# Patient Record
Sex: MALE | Race: OTHER | NOT HISPANIC OR LATINO | Employment: FULL TIME | URBAN - METROPOLITAN AREA
[De-identification: names, ages, dates, MRNs, and addresses within clinical notes are randomized per-mention and may not be internally consistent; named-entity substitution may affect disease eponyms.]

---

## 2023-03-17 LAB — HBA1C MFR BLD HPLC: 5.1 %

## 2023-08-18 ENCOUNTER — APPOINTMENT (OUTPATIENT)
Dept: URGENT CARE | Facility: CLINIC | Age: 43
End: 2023-08-18

## 2023-08-18 ENCOUNTER — APPOINTMENT (OUTPATIENT)
Dept: LAB | Facility: CLINIC | Age: 43
End: 2023-08-18

## 2023-08-18 DIAGNOSIS — Z02.1 PRE-EMPLOYMENT HEALTH SCREENING EXAMINATION: Primary | ICD-10-CM

## 2023-08-18 DIAGNOSIS — Z02.1 PRE-EMPLOYMENT HEALTH SCREENING EXAMINATION: ICD-10-CM

## 2023-08-18 LAB
MEV IGG SER QL IA: NORMAL
MUV IGG SER QL IA: NORMAL
RUBV IGG SERPL IA-ACNC: 40.5 IU/ML
VZV IGG SER QL IA: NORMAL

## 2023-08-18 PROCEDURE — 86787 VARICELLA-ZOSTER ANTIBODY: CPT

## 2023-08-18 PROCEDURE — 86765 RUBEOLA ANTIBODY: CPT

## 2023-08-18 PROCEDURE — 86480 TB TEST CELL IMMUN MEASURE: CPT

## 2023-08-18 PROCEDURE — 86762 RUBELLA ANTIBODY: CPT

## 2023-08-18 PROCEDURE — 36415 COLL VENOUS BLD VENIPUNCTURE: CPT

## 2023-08-18 PROCEDURE — 86735 MUMPS ANTIBODY: CPT

## 2023-08-22 LAB
GAMMA INTERFERON BACKGROUND BLD IA-ACNC: 0.03 IU/ML
M TB IFN-G BLD-IMP: NEGATIVE
M TB IFN-G CD4+ BCKGRND COR BLD-ACNC: 0.04 IU/ML
M TB IFN-G CD4+ BCKGRND COR BLD-ACNC: 0.06 IU/ML
MITOGEN IGNF BCKGRD COR BLD-ACNC: >10 IU/ML

## 2023-09-26 ENCOUNTER — OFFICE VISIT (OUTPATIENT)
Dept: FAMILY MEDICINE CLINIC | Facility: CLINIC | Age: 43
End: 2023-09-26
Payer: COMMERCIAL

## 2023-09-26 VITALS
OXYGEN SATURATION: 99 % | SYSTOLIC BLOOD PRESSURE: 110 MMHG | TEMPERATURE: 95.3 F | HEIGHT: 69 IN | BODY MASS INDEX: 27.25 KG/M2 | WEIGHT: 184 LBS | HEART RATE: 64 BPM | DIASTOLIC BLOOD PRESSURE: 76 MMHG

## 2023-09-26 DIAGNOSIS — E03.9 ACQUIRED HYPOTHYROIDISM: ICD-10-CM

## 2023-09-26 DIAGNOSIS — Z13.220 LIPID SCREENING: ICD-10-CM

## 2023-09-26 DIAGNOSIS — R04.0 FREQUENT NOSEBLEEDS: ICD-10-CM

## 2023-09-26 DIAGNOSIS — Z87.898 HISTORY OF PREDIABETES: ICD-10-CM

## 2023-09-26 DIAGNOSIS — Z76.89 ENCOUNTER TO ESTABLISH CARE: Primary | ICD-10-CM

## 2023-09-26 PROCEDURE — 99203 OFFICE O/P NEW LOW 30 MIN: CPT | Performed by: NURSE PRACTITIONER

## 2023-09-26 RX ORDER — RUXOLITINIB 15 MG/G
CREAM TOPICAL
COMMUNITY
Start: 2023-08-17

## 2023-09-26 RX ORDER — SEMAGLUTIDE 0.5 MG/.5ML
0.5 INJECTION, SOLUTION SUBCUTANEOUS
COMMUNITY
Start: 2023-06-08

## 2023-09-26 RX ORDER — LEVOTHYROXINE SODIUM 0.07 MG/1
1 TABLET ORAL DAILY
COMMUNITY
Start: 2023-04-27 | End: 2023-09-26 | Stop reason: SDUPTHER

## 2023-09-26 RX ORDER — LEVOTHYROXINE SODIUM 0.07 MG/1
75 TABLET ORAL DAILY
Qty: 90 TABLET | Refills: 0 | Status: SHIPPED | OUTPATIENT
Start: 2023-09-26

## 2023-09-26 NOTE — PATIENT INSTRUCTIONS
Labs as ordered  Continue levothyroxine 75 mcg daily  F/up with Gi as scheduled for difficulty swallowing  F/up with ENT for frequent nose bleeds  Schedule appointment for annual physical after labs completed

## 2023-10-05 ENCOUNTER — TELEPHONE (OUTPATIENT)
Dept: GASTROENTEROLOGY | Facility: CLINIC | Age: 43
End: 2023-10-05

## 2023-10-05 ENCOUNTER — OFFICE VISIT (OUTPATIENT)
Dept: GASTROENTEROLOGY | Facility: CLINIC | Age: 43
End: 2023-10-05
Payer: COMMERCIAL

## 2023-10-05 VITALS
BODY MASS INDEX: 27.7 KG/M2 | SYSTOLIC BLOOD PRESSURE: 100 MMHG | WEIGHT: 187 LBS | HEIGHT: 69 IN | DIASTOLIC BLOOD PRESSURE: 70 MMHG

## 2023-10-05 DIAGNOSIS — R13.19 ESOPHAGEAL DYSPHAGIA: Primary | ICD-10-CM

## 2023-10-05 DIAGNOSIS — R10.13 EPIGASTRIC PAIN: ICD-10-CM

## 2023-10-05 DIAGNOSIS — Z12.11 COLON CANCER SCREENING: ICD-10-CM

## 2023-10-05 PROCEDURE — 99204 OFFICE O/P NEW MOD 45 MIN: CPT | Performed by: INTERNAL MEDICINE

## 2023-10-05 NOTE — PROGRESS NOTES
[address] [place] [address] [facility] Outpatient Consultation  Ebb Or 37 y.o. male MRN: [id number]  Encounter: [id number]    ASSESSMENT AND PLAN:      1. Esophageal dysphagia  1 to 2 years of food sticking in his upper esophagus with his initial swallow, better with water. Does not occur with pills. No change with omeprazole. Discussed with work-up options including barium swallow, motility testing or upper endoscopy. He agrees to EGD with dilatation if indicated. - EGD; Future    2. Epigastric pain  2 to 3 years of epigastric burning with some associated back pain, no specific food triggers. He has noted it with banana and 9 bread, but no change with a gluten-free diet. Some improvement with omeprazole. Plan upper endoscopy to assess for gastritis, peptic ulcer disease and H. pylori  - EGD; Future    3. Colon cancer screening  Plan colonoscopy at the time of upcoming EGD  - Colonoscopy; Future      Followup Appointment: Pending EGD/colonoscopy  ______________________________________________________________________    Chief Complaint   Patient presents with   • dificulty swallowing; burning sensation       HPI:   Alexy Or is a 37y.o. year old male who presents for consultation at the request of his PCP for multiple GI complaints. In around 2020 he began to experience upper abdominal burning after eating. This does not occur every day or with every food. There are no reliable food triggers. He has noted it with bananas and with 9 bread. He does okay with tomato sauce and spicy foods. There is no associated nausea or vomiting. His appetite is good and his weight is stable. He tried omeprazole which helped symptoms. He has been off of it for several weeks and symptoms have returned. He denies any regular NSAID use. He also complains of food sticking in his upper esophagus. This occurs with the first swallow at any meal.  It is occurred with meats and with breads.   It does not occur with liquids or pills. Symptoms improve when he drinks some water. He denies any lower GI complaints. He is scheduling colon cancer screening at the time of EGD. Historical Information   Past Medical History:   Diagnosis Date   • Disease of thyroid gland      Past Surgical History:   Procedure Laterality Date   • LIPOMA RESECTION Bilateral 2013     Social History     Substance and Sexual Activity   Alcohol Use Yes   • Alcohol/week: 2.0 standard drinks of alcohol   • Types: 2 Shots of liquor per week     Social History     Substance and Sexual Activity   Drug Use Never     Social History     Tobacco Use   Smoking Status Never   Smokeless Tobacco Never     Family History   Problem Relation Age of Onset   • Hypertension Mother    • Diabetes Mother    • Glaucoma Father        Meds/Allergies     Current Outpatient Medications:   •  levothyroxine 75 mcg tablet  •  Semaglutide-Weight Management [facility]) 0.5 MG/0.5ML  •  Opzelura 1.5 % CREA    Allergies   Allergen Reactions   • Pollen Extract Allergic Rhinitis and Shortness Of Breath     Other reaction(s): Sneezing  Other reaction(s): Sneezing         PHYSICAL EXAM:    Blood pressure 100/70, height 5' 9" (1.753 m), weight 84.8 kg (187 lb). Body mass index is 27.62 kg/m². General Appearance: NAD, cooperative, alert  Eyes: Anicteric, PERRLA, EOMI  ENT:  Normocephalic, atraumatic, normal mucosa. Neck:  Supple, symmetrical, trachea midline,   Resp:  Clear to auscultation bilaterally; no rales, rhonchi or wheezing; respirations unlabored   CV:  S1 S2, Regular rate and rhythm; no murmur, rub, or gallop. GI:  Soft, non-tender, non-distended; normal bowel sounds; no masses, no organomegaly   Rectal: Deferred  Musculoskeletal: No cyanosis, clubbing or edema. Normal ROM.   Skin:  No jaundice, rashes, or lesions   Heme/Lymph: No palpable cervical lymphadenopathy  Psych: Normal affect, good eye contact  Neuro: No gross deficits, AAOx3    Lab Results:   No results found for: "WBC", "HGB", "HCT", "MCV", "PLT"  No results found for: "NA", "K", "CL", "CO2", "ANIONGAP", "BUN", "CREATININE", "GLUCOSE", "GLUF", "CALCIUM", "Genevie Shown", "AST", "ALT", "ALKPHOS", "PROT", "BILITOT", "EGFR"  No results found for: "IRON", "TIBC", "FERRITIN"  No results found for: "LIPASE"    Radiology Results:   No results found. REVIEW OF SYSTEMS:    CONSTITUTIONAL: Denies any fever, chills, rigors, and weight loss. HEENT: No earache or tinnitus. Denies hearing loss or visual disturbances. CARDIOVASCULAR: No chest pain or palpitations. RESPIRATORY: Denies any cough, hemoptysis, shortness of breath or dyspnea on exertion. GASTROINTESTINAL: As noted in the History of Present Illness. GENITOURINARY: No problems with urination. Denies any hematuria or dysuria. NEUROLOGIC: No dizziness or vertigo, denies headaches. MUSCULOSKELETAL: Denies any muscle or joint pain. SKIN: Denies skin rashes or itching. ENDOCRINE: Denies excessive thirst. Denies intolerance to heat or cold. PSYCHOSOCIAL: Denies depression or anxiety. Denies any recent memory loss. Answers for HPI/ROS submitted by the patient on 10/4/2023  Chronicity: new  Onset: 1 to 4 weeks ago  Onset quality: undetermined  Frequency: rarely  Episode duration: 1 Hours  Progression since onset: resolved  Pain location: LLQ  Pain - numeric: 0/10  Pain quality: aching  Radiates to: does not radiate  anorexia: No  arthralgias: No  belching: No  constipation: No  diarrhea: No  dysuria: No  fever: No  flatus: No  frequency: No  headaches: No  hematochezia: No  hematuria: No  melena: No  myalgias: No  nausea:  No  weight loss: No  vomiting: No  Aggravated by: nothing  Relieved by: nothing

## 2023-10-05 NOTE — TELEPHONE ENCOUNTER
Scheduled date of colonoscopy (as of today):11-10-23  Physician performing colonoscopy:JYOTHI  Location of colonoscopy:[facility]  Bowel prep reviewed with patient:Miralax  Instructions reviewed with patient by:YESENIA  Clearances: no     Rachid Hu   wife  [phone]

## 2023-10-06 NOTE — PROGRESS NOTES
[facility]        NAME: Ivanna Tariq is a 37 y.o. male  : 1980    MRN: [id number]  DATE: 2023  TIME: 4:06 PM    Assessment and Plan   Encounter to establish care [Z76.89]  1. Encounter to establish care        2. Acquired hypothyroidism  levothyroxine 75 mcg tablet    TSH, 3rd generation with Free T4 reflex      3. History of prediabetes  Comprehensive metabolic panel    HEMOGLOBIN A1C W/ EAG ESTIMATION      4. Lipid screening  Lipid panel      5. Frequent nosebleeds  Ambulatory Referral to Otolaryngology            Patient Instructions     Patient Instructions   Labs as ordered  Continue levothyroxine 75 mcg daily  F/up with Gi as scheduled for difficulty swallowing  F/up with ENT for frequent nose bleeds  Schedule appointment for annual physical after labs completed          Chief Complaint     Chief Complaint   Patient presents with   • Establish Care         History of Present Illness       New patient to establish care. [facility]. Hx of hypothyroidism-taking Levothyroxine 75 mcg daily. Last TSH was in normal range. He was being followed by endocrine in [place] before moving here. He was also to start on Wegovy for obesity and prediabetes-medication was not filled due to shortage. He has new insurance-will repeat labs and send new script for Barak Roberts once labs and physical completed. C/o difficulty swallowing/food getting stuck-he has appointment with GI. C/o frequent nose bleed for years-will refer to ENT. Review of Systems   Review of Systems   Constitutional: Negative for activity change and fever. HENT: Positive for nosebleeds and trouble swallowing. Eyes: Negative for visual disturbance. Respiratory: Negative for chest tightness and shortness of breath. Cardiovascular: Negative for chest pain. Gastrointestinal: Negative for abdominal pain. Genitourinary: Negative for difficulty urinating. Skin: Negative for color change. Patient would like to know can she  some samples of Ubrevely until her new script is sent in? Patient is completely out and she has been dealing with a migraines the last 4 days.     Please contact Neurological: Negative for dizziness and weakness. Psychiatric/Behavioral: Negative for dysphoric mood. Current Medications       Current Outpatient Medications:   •  levothyroxine 75 mcg tablet, Take 1 tablet (75 mcg total) by mouth daily, Disp: 90 tablet, Rfl: 0  •  Semaglutide-Weight Management [facility]) 0.5 MG/0.5ML, Inject 0.5 mg under the skin, Disp: , Rfl:   •  Opzelura 1.5 % CREA, , Disp: , Rfl:     Current Allergies     Allergies as of 09/26/2023 - Reviewed 09/26/2023   Allergen Reaction Noted   • Pollen extract Allergic Rhinitis and Shortness Of Breath 05/28/2019            The following portions of the patient's history were reviewed and updated as appropriate: allergies, current medications, past family history, past medical history, past social history, past surgical history and problem list.     Past Medical History:   Diagnosis Date   • Disease of thyroid gland        Past Surgical History:   Procedure Laterality Date   • LIPOMA RESECTION Bilateral 2013       Family History   Problem Relation Age of Onset   • Hypertension Mother    • Diabetes Mother    • Glaucoma Father          Medications have been verified. Objective   /76 (BP Location: Left arm, Patient Position: Sitting, Cuff Size: Standard)   Pulse 64   Temp (!) 95.3 °F (35.2 °C) (Tympanic)   Ht 5' 8.85" (1.749 m)   Wt 83.5 kg (184 lb)   SpO2 99%   BMI 27.29 kg/m²        Physical Exam     Physical Exam  Vitals and nursing note reviewed. Constitutional:       General: He is not in acute distress. Appearance: Normal appearance. He is not ill-appearing. HENT:      Head: Normocephalic. Eyes:      Extraocular Movements: Extraocular movements intact. Pulmonary:      Effort: Pulmonary effort is normal. No respiratory distress. Musculoskeletal:         General: Normal range of motion. Skin:     General: Skin is warm and dry. Coloration: Skin is not pale. Findings: No erythema.    Neurological: Mental Status: He is alert and oriented to person, place, and time. Psychiatric:         Mood and Affect: Mood normal.         Behavior: Behavior normal.         Thought Content:  Thought content normal.         Judgment: Judgment normal.             PHQ-2/9 Depression Screening    Little interest or pleasure in doing things: 0 - not at all  Feeling down, depressed, or hopeless: 0 - not at all  PHQ-2 Score: 0  PHQ-2 Interpretation: Negative depression screen

## 2023-10-27 ENCOUNTER — ANESTHESIA (OUTPATIENT)
Dept: ANESTHESIOLOGY | Facility: AMBULATORY SURGERY CENTER | Age: 43
End: 2023-10-27

## 2023-10-27 ENCOUNTER — ANESTHESIA EVENT (OUTPATIENT)
Dept: ANESTHESIOLOGY | Facility: AMBULATORY SURGERY CENTER | Age: 43
End: 2023-10-27

## 2023-11-01 ENCOUNTER — OFFICE VISIT (OUTPATIENT)
Dept: DERMATOLOGY | Facility: CLINIC | Age: 43
End: 2023-11-01
Payer: COMMERCIAL

## 2023-11-01 VITALS — HEIGHT: 69 IN | BODY MASS INDEX: 28.17 KG/M2 | WEIGHT: 190.2 LBS | TEMPERATURE: 96.6 F

## 2023-11-01 DIAGNOSIS — L81.0 POST-INFLAMMATORY HYPERPIGMENTATION: ICD-10-CM

## 2023-11-01 DIAGNOSIS — L85.3 XEROSIS CUTIS: ICD-10-CM

## 2023-11-01 DIAGNOSIS — Q80.0 ICHTHYOSIS VULGARIS: Primary | ICD-10-CM

## 2023-11-01 DIAGNOSIS — Z87.2 HISTORY OF COLD URTICARIA: ICD-10-CM

## 2023-11-01 PROCEDURE — 99204 OFFICE O/P NEW MOD 45 MIN: CPT | Performed by: DERMATOLOGY

## 2023-11-01 RX ORDER — EPINEPHRINE 0.3 MG/.3ML
0.3 INJECTION SUBCUTANEOUS ONCE
Qty: 0.6 ML | Refills: 0 | Status: SHIPPED | OUTPATIENT
Start: 2023-11-01 | End: 2023-11-01

## 2023-11-01 NOTE — PATIENT INSTRUCTIONS
DERM HPI/ Abiosis vulgaris       Assessment and Plan:  Based on a thorough discussion of this condition and the management approach to it (including a comprehensive discussion of the known risks, side effects and potential benefits of treatment), the patient (family) agrees to implement the following specific plan:  Discussed ways to help decrease dryness:  Use fragrance-free soap and detergent   Use a thick cream - discussed using one with an exfoliant  Using Gold Bond rough and bumpy (salicylic acid is an exfoliant) is okay. If desired prescription - cost is around $40  Instead of Gold Bond, can use: Cereve SA cream for Rough and Bumpy OR Cereve Psoriasis  Also use: Amlactin Rapid Relief  OR Amlactin Daily     URTICARIA ("ACUTE")    Assessment and Plan:  Based on a thorough discussion of this condition and the management approach to it (including a comprehensive discussion of the known risks, side effects and potential benefits of treatment), the patient (family) agrees to implement the following specific plan:  Discussed a prescribed injection instead of current oral antihistamines. Make sure to have an epi pen at home    What is urticaria? Urticaria is characterised by weals (hives) or angioedema (swellings, in 10%) or both (in 40%). There are several types of urticaria. The name urticaria is derived from the common European stinging nettle 'Urtica dioica'. A weal (or wheal) is a superficial skin-coloured or pale skin swelling, usually surrounded by erythema (redness) that lasts anything from a few minutes to 24 hours. Usually very itchy, it may have a burning sensation. Angioedema is deeper swelling within the skin or mucous membranes and can be skin-coloured or red. It resolves within 72 hours. Angioedema may be itchy or painful but is often asymptomatic. What is acute urticaria? Acute urticaria is urticaria, with or without angioedema, that is present for less than 6 weeks.  It is often gone within hours to days. Who gets acute urticaria? One in five children or adults has an episode of acute urticaria during their lifetime. It is more common in atopic individuals. It affects all races and both sexes. What are the clinical features of acute urticaria? Urticarial weals can be a few millimeters or several centimeters in diameter, colored white or red, with or without a red flare. Each weal may last a few minutes or several hours and may change shape. Weals may be round, or form rings, a map-like pattern, targetoid lesions, or giant patches. Acute urticaria can affect any site of the body and tends to be distributed widely. Angioedema is more often localised. It commonly affects the face (especially eyelids and perioral sites), hands, feet and genitalia. It may involve tongue, uvula, soft palate, larynx. Serum sickness due to blood transfusion and serum sickness-like reactions due to certain drugs cause acute urticaria leaving bruises, fever, swollen lymph glands, joint pain and swelling. What causes acute urticaria? Weals are due to release of chemical mediators from tissue mast cells and circulating basophils. These chemical mediators include histamine, platelet-activating factor and cytokines. The mediators activate sensory nerves and cause dilation of blood vessels and leakage of fluid into surrounding tissues. Bradykinin release causes angioedema. Several hypotheses have been proposed to explain urticaria. The immune, arachidonic acid and coagulation systems are involved, and genetic mutations are under investigation. Serum sickness and serum sickness-like reactions are due to immune complex deposition in affected tissues. Acute urticaria can be induced by the following factors but the cause is not always identified.   Acute viral infection -- an upper respiratory infection, viral hepatitis, infectious mononucleosis, mycoplasma   Acute bacterial infection -- a dental abscess, sinusitis   Food allergy (IgE mediated) -- usually milk, egg, peanut, shellfish   Drug allergy (IgE mediated) -- often an antibiotic   Drug pseudoallergy -- aspirin, nonselective nonsteroidal anti-inflammatory drugs, opiates, radiocontrast media; these cause urticaria without immune activation   Vaccination   Bee or wasp stings     Widespread reaction following localized contact urticaria -- rubber latex  Severe allergic urticaria may lead to anaphylactic shock (bronchospasm, collapse). A single episode or recurrent episodes of angioedema without urticaria can be due to an angiotensin-converting enzyme (ACE) inhibitor drug. How is acute urticaria diagnosed? Acute urticaria is diagnosed in people with a short history of weals that last less than 24 hours, with or without angioedema. A thorough physical examination should be undertaken to look for underlying causes. Skin prick tests and radioallergosorbent tests (RAST) or CAP fluoroimmunoassay may be requested if a drug or food allergy is suspected in acute urticaria. Biopsy of urticaria can be non-specific and difficult to interpret. The pathology shows oedema in the dermis and dilated blood vessels, with a variable mixed inflammatory infiltrate. Vessel-wall damage indicates urticarial vasculitis. What is the treatment for acute urticaria? The main treatment for acute urticaria in adults and in children is with an oral second-generation antihistamine chosen from the list below. If the standard dose (eg 10 mg for cetirizine) is not effective, the dose can be increased fourfold (eg 40 mg cetirizine daily). They are best taken continuously rather than on demand. They are stopped when the acute urticaria has settled down. There is not thought to be any benefit from adding a second antihistamine.   Cetirizine   Loratadine   Fexofenadine   Desloratadine   Levocetirizine   Rupatadine   Bilastine  Terfenadine and astemizole should not be used as they are cardiotoxic in combination with ketoconazole or erythromycin. They are no longer available in [facility]. Although systemic treatment is best avoided during pregnancy and breastfeeding, there have been no reports that second-generation antihistamines cause birth defects. If treatment is required, loratadine and cetirizine are currently preferred. Conventional first-generation antihistamines such as promethazine or chlorpheniramine are no longer recommended for urticaria. Avoidance of trigger factors  In addition to antihistamines, the cause of urticaria should be eliminated if known (eg drug or food allergy). Avoidance of relevant type 1 (IgE-mediated) allergens clears urticaria within 48 hours. In addition to antihistamines, the triggers for urticaria should be avoided where possible. For example:  Avoid aspirin, opiates and nonsteroidal anti-inflammatory drugs (paracetamol is generally safe). Avoid known allergies that have been confirmed by positive specific IgE/skin prick tests if these have clinical relevance for urticaria. Cool the affected area with a fan, cold flannel, ice pack or soothing moisturising lotion. Treatment of refractory acute urticaria  If non-sedating antihistamines are not effective, a 4 to 5-day course of oral prednisone (prednisolone) may be warranted in severe acute urticaria, particularly if there is angioedema. Systemic steroids do not speed up the resolution of symptoms. Intramuscular injection of adrenaline (epinephrine) is reserved for life-threatening anaphylaxis or swelling of the throat. ECZEMA  -- Shower with lukewarm water less than 10 minutes   -- Use Dove unscented soap to groin and armpits and neck  -- Pat dry after shower.  Do not harshly rub.   -- Immediately moisturize with heavy emollient    BEST - OINTMENTS, such as Vaseline, Aquaphor, Cerave healing ointment    BETTER - CREAMS, such as Cerave, Cetaphil, VaniCREAM, Aveeno, Eucerin   AVOID LOTIONS, too thin, most things in pump  -- Moisturize twice a day. -- Apply your prescription medicine twice a day for up to 2 weeks. You can use longer than 2 weeks if red irritated rash persists. Then after that, use as needed for itch. This medicine can cause skin thinning if no rash present.    -- When rash clears, KEEP MOISTURIZING DAILY, so rash does not return

## 2023-11-01 NOTE — PROGRESS NOTES
[facility] Note     Patient Name: Neelima Nino  Encounter Date: 11/1/2023     Have you been cared for by a [facility]ologist in the last 3 years and, if so, which description applies to you? NO. I am considered a "new" patient and must complete all patient intake questions. I am MALE/not capable of bearing children. REVIEW OF SYSTEMS:  Have you recently had or currently have any of the following? Recent fever or chills? No  Any non-healing wound? No   PAST MEDICAL HISTORY:  Have you personally ever had or currently have any of the following? If "YES," then please provide more detail. Skin cancer (such as Melanoma, Basal Cell Carcinoma, Squamous Cell Carcinoma? No  Tuberculosis, HIV/AIDS, Hepatitis B or C: No  Radiation Treatment No   HISTORY OF IMMUNOSUPPRESSION:   Do you have a history of any of the following:  Systemic Immunosuppression such as Diabetes, Biologic or Immunotherapy, Chemotherapy, Organ Transplantation, Bone Marrow Transplantation? No     Answering "YES" requires the addition of the dotphrase "IMMUNOSUPPRESSED" as the first diagnosis of the patient's visit. FAMILY HISTORY:  Any "first degree relatives" (parent, brother, sister, or child) with the following? Skin Cancer, Pancreatic or Other Cancer? No   PATIENT EXPERIENCE:    Do you want the Dermatologist to perform a COMPLETE skin exam today including a clinical examination under the "bra and underwear" areas? NO  If necessary, do we have your permission to call and leave a detailed message on your Preferred Phone number that includes your specific medical information?   Yes      Allergies   Allergen Reactions    Pollen Extract Allergic Rhinitis and Shortness Of Breath     Other reaction(s): Sneezing  Other reaction(s): Sneezing        Current Outpatient Medications:     levothyroxine 75 mcg tablet, Take 1 tablet (75 mcg total) by mouth daily, Disp: 90 tablet, Rfl: 0    Opzelura 1.5 % CREA, , Disp: , Rfl: Semaglutide-Weight Management [facility]) 0.5 MG/0.5ML, Inject 0.5 mg under the skin 10/5/2023 currently not taking, but may in the future, Disp: , Rfl:           Whom besides the patient is providing clinical information about today's encounter? 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 Was prescribed opzelura but did not pick it up    Assessment and Plan:  Based on a thorough discussion of this condition and the management approach to it (including a comprehensive discussion of the known risks, side effects and potential benefits of treatment), the patient (family) agrees to implement the following specific plan:  Patient has history of atopic derm on neck but well controlled and not flaring. Denies itching   Discussed ways to help decrease dryness:  Use fragrance-free soap and detergent   Use a thick cream - discussed using one with an exfoliant  Using Gold Bond rough and bumpy (salicylic acid is an exfoliant) is okay. Instead of Gold [place], can try: Cereve SA cream for Rough and Bumpy OR Cereve Psoriasis  Also try: Amlactin Rapid Relief  OR Amlactin Daily     History of cold urticaria    Physical Exam:  Anatomic Location Affected:  all over body  Morphological Description:  clear today  Pertinent Positives:  Pertinent Negatives:     Additional History of Present Condition:  Patient has allergy to cold    Assessment and Plan:  Based on a thorough discussion of this condition and the management approach to it (including a comprehensive discussion of the known risks, side effects and potential benefits of treatment), the patient (family) agrees to implement the following specific plan:  Reports cold urticaria last winter - controlled with OTC Allegra   Discussed a prescribed injection of xolair instead of current oral antihistamines if it is not well controlled.  He will see if it recurs this winter   Discussed signs of anaphylaxis - patient has not had anaphylaxis in the past. Script for epipen sent         Scribe Attestation      I,:  Kenton Sargent am acting as a scribe while in the presence of the attending physician.:       I,:  Georgiana Putnam MD personally performed the services described in this documentation    as scribed in my presence.:

## 2023-11-10 ENCOUNTER — HOSPITAL ENCOUNTER (OUTPATIENT)
Dept: GASTROENTEROLOGY | Facility: AMBULATORY SURGERY CENTER | Age: 43
Discharge: HOME | End: 2023-11-10
Payer: COMMERCIAL

## 2023-11-10 ENCOUNTER — ANESTHESIA EVENT (OUTPATIENT)
Dept: GASTROENTEROLOGY | Facility: AMBULATORY SURGERY CENTER | Age: 43
End: 2023-11-10

## 2023-11-10 ENCOUNTER — ANESTHESIA (OUTPATIENT)
Dept: GASTROENTEROLOGY | Facility: AMBULATORY SURGERY CENTER | Age: 43
End: 2023-11-10

## 2023-11-10 VITALS
WEIGHT: 180 LBS | HEIGHT: 69 IN | RESPIRATION RATE: 12 BRPM | TEMPERATURE: 98 F | OXYGEN SATURATION: 100 % | DIASTOLIC BLOOD PRESSURE: 69 MMHG | BODY MASS INDEX: 26.66 KG/M2 | HEART RATE: 57 BPM | SYSTOLIC BLOOD PRESSURE: 118 MMHG

## 2023-11-10 DIAGNOSIS — R13.19 ESOPHAGEAL DYSPHAGIA: ICD-10-CM

## 2023-11-10 DIAGNOSIS — Z12.11 COLON CANCER SCREENING: ICD-10-CM

## 2023-11-10 DIAGNOSIS — R10.13 EPIGASTRIC PAIN: ICD-10-CM

## 2023-11-10 DIAGNOSIS — K22.10 EROSIVE ESOPHAGITIS: Primary | ICD-10-CM

## 2023-11-10 PROCEDURE — G0121 COLON CA SCRN NOT HI RSK IND: HCPCS | Performed by: INTERNAL MEDICINE

## 2023-11-10 PROCEDURE — 43239 EGD BIOPSY SINGLE/MULTIPLE: CPT | Performed by: INTERNAL MEDICINE

## 2023-11-10 PROCEDURE — 88341 IMHCHEM/IMCYTCHM EA ADD ANTB: CPT | Performed by: STUDENT IN AN ORGANIZED HEALTH CARE EDUCATION/TRAINING PROGRAM

## 2023-11-10 PROCEDURE — 88342 IMHCHEM/IMCYTCHM 1ST ANTB: CPT | Performed by: STUDENT IN AN ORGANIZED HEALTH CARE EDUCATION/TRAINING PROGRAM

## 2023-11-10 PROCEDURE — 88305 TISSUE EXAM BY PATHOLOGIST: CPT | Performed by: STUDENT IN AN ORGANIZED HEALTH CARE EDUCATION/TRAINING PROGRAM

## 2023-11-10 PROCEDURE — 88312 SPECIAL STAINS GROUP 1: CPT | Performed by: STUDENT IN AN ORGANIZED HEALTH CARE EDUCATION/TRAINING PROGRAM

## 2023-11-10 RX ORDER — SODIUM CHLORIDE, SODIUM LACTATE, POTASSIUM CHLORIDE, CALCIUM CHLORIDE 600; 310; 30; 20 MG/100ML; MG/100ML; MG/100ML; MG/100ML
INJECTION, SOLUTION INTRAVENOUS CONTINUOUS PRN
Status: DISCONTINUED | OUTPATIENT
Start: 2023-11-10 | End: 2023-11-10

## 2023-11-10 RX ORDER — SODIUM CHLORIDE, SODIUM LACTATE, POTASSIUM CHLORIDE, CALCIUM CHLORIDE 600; 310; 30; 20 MG/100ML; MG/100ML; MG/100ML; MG/100ML
50 INJECTION, SOLUTION INTRAVENOUS CONTINUOUS
Status: DISCONTINUED | OUTPATIENT
Start: 2023-11-10 | End: 2023-11-14 | Stop reason: HOSPADM

## 2023-11-10 RX ORDER — OMEPRAZOLE 40 MG/1
40 CAPSULE, DELAYED RELEASE ORAL DAILY
Qty: 90 CAPSULE | Refills: 3 | Status: SHIPPED | OUTPATIENT
Start: 2023-11-10

## 2023-11-10 RX ORDER — PROPOFOL 10 MG/ML
INJECTION, EMULSION INTRAVENOUS AS NEEDED
Status: DISCONTINUED | OUTPATIENT
Start: 2023-11-10 | End: 2023-11-10

## 2023-11-10 RX ADMIN — PROPOFOL 30 MG: 10 INJECTION, EMULSION INTRAVENOUS at 14:30

## 2023-11-10 RX ADMIN — PROPOFOL 40 MG: 10 INJECTION, EMULSION INTRAVENOUS at 14:24

## 2023-11-10 RX ADMIN — PROPOFOL 30 MG: 10 INJECTION, EMULSION INTRAVENOUS at 14:21

## 2023-11-10 RX ADMIN — SODIUM CHLORIDE, SODIUM LACTATE, POTASSIUM CHLORIDE, CALCIUM CHLORIDE 50 ML/HR: 600; 310; 30; 20 INJECTION, SOLUTION INTRAVENOUS at 14:00

## 2023-11-10 RX ADMIN — PROPOFOL 40 MG: 10 INJECTION, EMULSION INTRAVENOUS at 14:09

## 2023-11-10 RX ADMIN — PROPOFOL 20 MG: 10 INJECTION, EMULSION INTRAVENOUS at 14:18

## 2023-11-10 RX ADMIN — PROPOFOL 50 MG: 10 INJECTION, EMULSION INTRAVENOUS at 14:14

## 2023-11-10 RX ADMIN — PROPOFOL 100 MG: 10 INJECTION, EMULSION INTRAVENOUS at 14:07

## 2023-11-10 RX ADMIN — PROPOFOL 50 MG: 10 INJECTION, EMULSION INTRAVENOUS at 14:17

## 2023-11-10 RX ADMIN — PROPOFOL 50 MG: 10 INJECTION, EMULSION INTRAVENOUS at 14:28

## 2023-11-10 RX ADMIN — SODIUM CHLORIDE, SODIUM LACTATE, POTASSIUM CHLORIDE, CALCIUM CHLORIDE: 600; 310; 30; 20 INJECTION, SOLUTION INTRAVENOUS at 14:05

## 2023-11-10 RX ADMIN — PROPOFOL 50 MG: 10 INJECTION, EMULSION INTRAVENOUS at 14:11

## 2023-11-10 RX ADMIN — PROPOFOL 50 MG: 10 INJECTION, EMULSION INTRAVENOUS at 14:08

## 2023-11-10 NOTE — H&P
History and Physical -  [facility]  Trace Dubon 37 y.o. male MRN: [id number]    HPI: Trace Dubon is a 37y.o. year old male who presents for GERD, dysphagia, colon cancer screening    REVIEW OF SYSTEMS: Per the HPI, and otherwise unremarkable. Historical Information   Past Medical History:   Diagnosis Date    Disease of thyroid gland      Past Surgical History:   Procedure Laterality Date    LIPOMA RESECTION Bilateral 2013     Social History   Social History     Substance and Sexual Activity   Alcohol Use Yes    Alcohol/week: 2.0 standard drinks of alcohol    Types: 2 Shots of liquor per week    Comment: 2 drinks/month     Social History     Substance and Sexual Activity   Drug Use Never     Social History     Tobacco Use   Smoking Status Never   Smokeless Tobacco Never     Family History   Problem Relation Age of Onset    Hypertension Mother     Diabetes Mother     Glaucoma Father        Meds/Allergies       Current Outpatient Medications:     levothyroxine 75 mcg tablet    EPINEPHrine (EPIPEN) 0.3 mg/0.3 mL SOAJ    Levothyroxine Sodium 62.5 MCG/ML SOLN    Opzelura 1.5 % CREA    Semaglutide-Weight Management [facility]) 0.5 MG/0.5ML    Current Facility-Administered Medications:     lactated ringers infusion, 50 mL/hr, Intravenous, Continuous, 50 mL/hr at 11/10/23 1400    Allergies   Allergen Reactions    Pollen Extract Allergic Rhinitis and Shortness Of Breath     Other reaction(s): Sneezing  Other reaction(s): Sneezing         Objective     /73   Pulse 70   Temp 98 °F (36.7 °C) (Temporal)   Resp 22   Ht 5' 9" (1.753 m)   Wt 81.6 kg (180 lb)   SpO2 100%   BMI 26.58 kg/m²     PHYSICAL EXAM    Gen: NAD AAOx3  Head: Normocephalic, Atraumatic  CV: S1S2 RRR no m/r/g  CHEST: Clear b/l no c/r/w  ABD: soft, +BS NT/ND  EXT: no edema    ASSESSMENT/PLAN:  This is a 37y.o. year old male here for EGD/colonoscopy, and he is stable and optimized for his procedure.

## 2023-11-10 NOTE — ANESTHESIA POSTPROCEDURE EVALUATION
Post-Op Assessment Note    CV Status:  Stable  Pain Score: 0    Pain management: adequate     Mental Status:  Alert and sleepy   Hydration Status:  Euvolemic   PONV Controlled:  Controlled   Airway Patency:  Patent      Post Op Vitals Reviewed: Yes      Staff: CRNA         No notable events documented.     BP      Temp      Pulse     Resp      SpO2

## 2023-11-10 NOTE — ANESTHESIA PREPROCEDURE EVALUATION
Procedure:  COLONOSCOPY  EGD    Relevant Problems   ENDO   (+) Hypothyroidism        Physical Exam    Airway    Mallampati score: I  TM Distance: >3 FB  Neck ROM: full     Dental       Cardiovascular  Cardiovascular exam normal    Pulmonary  Pulmonary exam normal     Other Findings        Anesthesia Plan  ASA Score- 2     Anesthesia Type- IV sedation with anesthesia with ASA Monitors. Additional Monitors:     Airway Plan:            Plan Factors-Exercise tolerance (METS): >4 METS. Chart reviewed. EKG reviewed. Imaging results reviewed. Existing labs reviewed. Patient summary reviewed. Induction- intravenous. Postoperative Plan- Plan for postoperative opioid use. Planned trial extubation    Informed Consent- Anesthetic plan and risks discussed with patient. I personally reviewed this patient with the CRNA. Discussed and agreed on the Anesthesia Plan with the CRNA. Karla Vidal

## 2023-11-10 NOTE — QUICK NOTE
Pt. Difficult to arouse with painful stimuli. A second 500 mls bag of Lactated ringers was hung for a SBP in the 90's. Dr. Dione Nix notified and assessed the patient. The patient was able to wake up at 1510.  Bp 118/69

## 2023-11-16 ENCOUNTER — APPOINTMENT (OUTPATIENT)
Dept: LAB | Facility: HOSPITAL | Age: 43
End: 2023-11-16
Payer: COMMERCIAL

## 2023-11-16 DIAGNOSIS — E03.9 ACQUIRED HYPOTHYROIDISM: ICD-10-CM

## 2023-11-16 DIAGNOSIS — Z13.220 LIPID SCREENING: ICD-10-CM

## 2023-11-16 DIAGNOSIS — Z87.898 HISTORY OF PREDIABETES: ICD-10-CM

## 2023-11-16 LAB
ALBUMIN SERPL BCP-MCNC: 4.1 G/DL (ref 3.5–5)
ALP SERPL-CCNC: 62 U/L (ref 34–104)
ALT SERPL W P-5'-P-CCNC: 23 U/L (ref 7–52)
ANION GAP SERPL CALCULATED.3IONS-SCNC: 5 MMOL/L
AST SERPL W P-5'-P-CCNC: 22 U/L (ref 13–39)
BILIRUB SERPL-MCNC: 0.55 MG/DL (ref 0.2–1)
BUN SERPL-MCNC: 8 MG/DL (ref 5–25)
CALCIUM SERPL-MCNC: 9.2 MG/DL (ref 8.4–10.2)
CHLORIDE SERPL-SCNC: 105 MMOL/L (ref 96–108)
CHOLEST SERPL-MCNC: 216 MG/DL
CO2 SERPL-SCNC: 28 MMOL/L (ref 21–32)
CREAT SERPL-MCNC: 1.03 MG/DL (ref 0.6–1.3)
EST. AVERAGE GLUCOSE BLD GHB EST-MCNC: 114 MG/DL
GFR SERPL CREATININE-BSD FRML MDRD: 87 ML/MIN/1.73SQ M
GLUCOSE P FAST SERPL-MCNC: 74 MG/DL (ref 65–99)
HBA1C MFR BLD: 5.6 %
HDLC SERPL-MCNC: 35 MG/DL
LDLC SERPL CALC-MCNC: 162 MG/DL (ref 0–100)
NONHDLC SERPL-MCNC: 181 MG/DL
POTASSIUM SERPL-SCNC: 4.1 MMOL/L (ref 3.5–5.3)
PROT SERPL-MCNC: 7.2 G/DL (ref 6.4–8.4)
SODIUM SERPL-SCNC: 138 MMOL/L (ref 135–147)
TRIGL SERPL-MCNC: 95 MG/DL
TSH SERPL DL<=0.05 MIU/L-ACNC: 1.08 UIU/ML (ref 0.45–4.5)

## 2023-11-16 PROCEDURE — 80053 COMPREHEN METABOLIC PANEL: CPT

## 2023-11-16 PROCEDURE — 80061 LIPID PANEL: CPT

## 2023-11-16 PROCEDURE — 36415 COLL VENOUS BLD VENIPUNCTURE: CPT

## 2023-11-16 PROCEDURE — 83036 HEMOGLOBIN GLYCOSYLATED A1C: CPT

## 2023-11-16 PROCEDURE — 84443 ASSAY THYROID STIM HORMONE: CPT

## 2023-11-17 ENCOUNTER — TELEPHONE (OUTPATIENT)
Dept: FAMILY MEDICINE CLINIC | Facility: CLINIC | Age: 43
End: 2023-11-17

## 2023-11-17 ENCOUNTER — TELEPHONE (OUTPATIENT)
Dept: GASTROENTEROLOGY | Facility: CLINIC | Age: 43
End: 2023-11-17

## 2023-11-17 DIAGNOSIS — K29.70 HELICOBACTER PYLORI GASTRITIS: Primary | ICD-10-CM

## 2023-11-17 DIAGNOSIS — B96.81 HELICOBACTER PYLORI GASTRITIS: Primary | ICD-10-CM

## 2023-11-17 PROCEDURE — 88305 TISSUE EXAM BY PATHOLOGIST: CPT | Performed by: STUDENT IN AN ORGANIZED HEALTH CARE EDUCATION/TRAINING PROGRAM

## 2023-11-17 PROCEDURE — 88312 SPECIAL STAINS GROUP 1: CPT | Performed by: STUDENT IN AN ORGANIZED HEALTH CARE EDUCATION/TRAINING PROGRAM

## 2023-11-17 PROCEDURE — 88341 IMHCHEM/IMCYTCHM EA ADD ANTB: CPT | Performed by: STUDENT IN AN ORGANIZED HEALTH CARE EDUCATION/TRAINING PROGRAM

## 2023-11-17 PROCEDURE — 88342 IMHCHEM/IMCYTCHM 1ST ANTB: CPT | Performed by: STUDENT IN AN ORGANIZED HEALTH CARE EDUCATION/TRAINING PROGRAM

## 2023-11-17 RX ORDER — METRONIDAZOLE 500 MG/1
500 TABLET ORAL 3 TIMES DAILY
Qty: 42 TABLET | Refills: 0 | Status: SHIPPED | OUTPATIENT
Start: 2023-11-17 | End: 2023-12-01

## 2023-11-17 RX ORDER — DOXYCYCLINE HYCLATE 100 MG/1
100 CAPSULE ORAL EVERY 12 HOURS SCHEDULED
Qty: 28 CAPSULE | Refills: 0 | Status: SHIPPED | OUTPATIENT
Start: 2023-11-17 | End: 2023-12-01

## 2023-11-17 RX ORDER — BISMUTH SUBSALICYLATE 262 MG/1
524 TABLET, CHEWABLE ORAL
Qty: 112 TABLET | Refills: 0 | Status: SHIPPED | OUTPATIENT
Start: 2023-11-17 | End: 2023-12-01

## 2023-11-17 NOTE — TELEPHONE ENCOUNTER
Called patient with biopsy results. Gastric biopsies show H. Pylori  Continue PPI  I prescribed the remainder of quadruple therapy    Please arrange office visit with me in about 2 months. At that point we will taper the PPI if able, and arrange H. pylori testing to confirm eradication.

## 2023-11-17 NOTE — TELEPHONE ENCOUNTER
----- Message from [address] [place] [address] sent at 11/17/2023 10:10 AM EST -----  Cholesterol is high-lifestyle modifications-diet/exercise-decrease fats, sugars, carbs in diet.

## 2023-12-04 ENCOUNTER — OFFICE VISIT (OUTPATIENT)
Dept: FAMILY MEDICINE CLINIC | Facility: CLINIC | Age: 43
End: 2023-12-04
Payer: COMMERCIAL

## 2023-12-04 VITALS
TEMPERATURE: 94.5 F | WEIGHT: 189 LBS | SYSTOLIC BLOOD PRESSURE: 116 MMHG | OXYGEN SATURATION: 98 % | HEART RATE: 65 BPM | BODY MASS INDEX: 27.91 KG/M2 | DIASTOLIC BLOOD PRESSURE: 84 MMHG

## 2023-12-04 DIAGNOSIS — Z00.00 ANNUAL PHYSICAL EXAM: Primary | ICD-10-CM

## 2023-12-04 DIAGNOSIS — E03.9 ACQUIRED HYPOTHYROIDISM: ICD-10-CM

## 2023-12-04 PROCEDURE — 99396 PREV VISIT EST AGE 40-64: CPT | Performed by: NURSE PRACTITIONER

## 2023-12-04 RX ORDER — LEVOTHYROXINE SODIUM 0.07 MG/1
75 TABLET ORAL DAILY
Qty: 90 TABLET | Refills: 0 | Status: SHIPPED | OUTPATIENT
Start: 2023-12-04

## 2023-12-04 NOTE — PATIENT INSTRUCTIONS
Labs reviewed-Cholesterol is borderline high-Diet/exercise as discussed-repeat 1 year  Continue Levothyroxine 75 mcg daily-repeat TSH in 6 months    Wellness Visit for Adults   AMBULATORY CARE:   A wellness visit  is when you see your healthcare provider to get screened for health problems. Your healthcare provider will also give you advice on how to stay healthy. Write down your questions so you remember to ask them. Ask your healthcare provider how often you should have a wellness visit. What happens at a wellness visit:  Your healthcare provider will ask about your health, and your family history of health problems. This includes high blood pressure, heart disease, and cancer. He or she will ask if you have symptoms that concern you, if you smoke, and about your mood. You may also be asked about your intake of medicines, supplements, food, and alcohol. Any of the following may be done: Your weight  will be checked. Your height may also be checked so your body mass index (BMI) can be calculated. Your BMI shows if you are at a healthy weight. Your blood pressure  and heart rate will be checked. Your temperature may also be checked. Blood and urine tests  may be done. Blood tests may be done to check your cholesterol levels. Abnormal cholesterol levels increase your risk for heart disease and stroke. You may also need a blood or urine test to check for diabetes if you are at increased risk. Urine tests may be done to look for signs of an infection or kidney disease. A physical exam  includes checking your heartbeat and lungs with a stethoscope. Your healthcare provider may also check your skin to look for sun damage. Screening tests  may be recommended. A screening test is done to check for diseases that may not cause symptoms. The screening tests you may need depend on your age, gender, family history, and lifestyle habits.  For example, colorectal screening may be recommended if you are 48years old or older. Screening tests you need if you are a woman:   A Pap smear  is used to screen for cervical cancer. Pap smears are usually done every 3 to 5 years depending on your age. You may need them more often if you have had abnormal Pap smear test results in the past. Ask your healthcare provider how often you should have a Pap smear. A mammogram  is an x-ray of your breasts to screen for breast cancer. Experts recommend mammograms every 2 years starting at age 48 years. You may need a mammogram at age 52 years or younger if you have an increased risk for breast cancer. Talk to your healthcare provider about when you should start having mammograms and how often you need them. Vaccines you may need:   Get an influenza vaccine  every year. The influenza vaccine protects you from the flu. Several types of viruses cause the flu. The viruses change over time, so new vaccines are made each year. Get a tetanus-diphtheria (Td) booster vaccine  every 10 years. This vaccine protects you against tetanus and diphtheria. Tetanus is a severe infection that may cause painful muscle spasms and lockjaw. Diphtheria is a severe bacterial infection that causes a thick covering in the back of your mouth and throat. Get a human papillomavirus (HPV) vaccine  if you are female and aged 23 to 32 or male 23 to 24 and never received it. This vaccine protects you from HPV infection. HPV is the most common infection spread by sexual contact. HPV may also cause vaginal, penile, and anal cancers. Get a pneumococcal vaccine  if you are aged 72 years or older. The pneumococcal vaccine is an injection given to protect you from pneumococcal disease. Pneumococcal disease is an infection caused by pneumococcal bacteria. The infection may cause pneumonia, meningitis, or an ear infection. Get a shingles vaccine  if you are 60 or older, even if you have had shingles before.  The shingles vaccine is an injection to protect you from the varicella-zoster virus. This is the same virus that causes chickenpox. Shingles is a painful rash that develops in people who had chickenpox or have been exposed to the virus. How to eat healthy:  My Plate is a model for planning healthy meals. It shows the types and amounts of foods that should go on your plate. Fruits and vegetables make up about half of your plate, and grains and protein make up the other half. A serving of dairy is included on the side of your plate. The amount of calories and serving sizes you need depends on your age, gender, weight, and height. Examples of healthy foods are listed below:  Eat a variety of vegetables  such as dark green, red, and orange vegetables. You can also include canned vegetables low in sodium (salt) and frozen vegetables without added butter or sauces. Eat a variety of fresh fruits , canned fruit in 100% juice, frozen fruit, and dried fruit. Include whole grains. At least half of the grains you eat should be whole grains. Examples include whole-wheat bread, wheat pasta, brown rice, and whole-grain cereals such as oatmeal.    Eat a variety of protein foods such as seafood (fish and shellfish), lean meat, and poultry without skin (turkey and chicken). Examples of lean meats include pork leg, shoulder, or tenderloin, and beef round, sirloin, tenderloin, and extra lean ground beef. Other protein foods include eggs and egg substitutes, beans, peas, soy products, nuts, and seeds. Choose low-fat dairy products such as skim or 1% milk or low-fat yogurt, cheese, and cottage cheese. Limit unhealthy fats  such as butter, hard margarine, and shortening. Exercise:  Exercise at least 30 minutes per day on most days of the week. Some examples of exercise include walking, biking, dancing, and swimming. You can also fit in more physical activity by taking the stairs instead of the elevator or parking farther away from stores.  Include muscle strengthening activities 2 days each week. Regular exercise provides many health benefits. It helps you manage your weight, and decreases your risk for type 2 diabetes, heart disease, stroke, and high blood pressure. Exercise can also help improve your mood. Ask your healthcare provider about the best exercise plan for you. General health and safety guidelines:   Do not smoke. Nicotine and other chemicals in cigarettes and cigars can cause lung damage. Ask your healthcare provider for information if you currently smoke and need help to quit. E-cigarettes or smokeless tobacco still contain nicotine. Talk to your healthcare provider before you use these products. Limit alcohol. A drink of alcohol is 12 ounces of beer, 5 ounces of wine, or 1½ ounces of liquor. Lose weight, if needed. Being overweight increases your risk of certain health conditions. These include heart disease, high blood pressure, type 2 diabetes, and certain types of cancer. Protect your skin. Do not sunbathe or use tanning beds. Use sunscreen with a SPF 15 or higher. Apply sunscreen at least 15 minutes before you go outside. Reapply sunscreen every 2 hours. Wear protective clothing, hats, and sunglasses when you are outside. Drive safely. Always wear your seatbelt. Make sure everyone in your car wears a seatbelt. A seatbelt can save your life if you are in an accident. Do not use your cell phone when you are driving. This could distract you and cause an accident. Pull over if you need to make a call or send a text message. Practice safe sex. Use latex condoms if are sexually active and have more than one partner. Your healthcare provider may recommend screening tests for sexually transmitted infections (STIs). Wear helmets, lifejackets, and protective gear. Always wear a helmet when you ride a bike or motorcycle, go skiing, or play sports that could cause a head injury. Wear protective equipment when you play sports.  Wear a meena when you are on a boat or doing water sports. © Copyright Ruth Ann Mar 2023 Information is for End User's use only and may not be sold, redistributed or otherwise used for commercial purposes. The above information is an  only. It is not intended as medical advice for individual conditions or treatments. Talk to your doctor, nurse or pharmacist before following any medical regimen to see if it is safe and effective for you.

## 2023-12-04 NOTE — PROGRESS NOTES
ADULT ANNUAL PHYSICAL  [address]    NAME: Starlene Mcardle  AGE: 37 y.o. SEX: male  : 1980     DATE: 2023     Assessment and Plan:     Problem List Items Addressed This Visit          Endocrine    Hypothyroidism    Relevant Medications    levothyroxine 75 mcg tablet    Other Relevant Orders    TSH, 3rd generation with Free T4 reflex   Continue Levothyroxine 75 mcg daily-repeat TSH in 6 months    Other Visit Diagnoses       Annual physical exam    -  Primary            Immunizations and preventive care screenings were discussed with patient today. Appropriate education was printed on patient's after visit summary. Counseling:  Alcohol/drug use: discussed moderation in alcohol intake, the recommendations for healthy alcohol use, and avoidance of illicit drug use. Dental Health: discussed importance of regular tooth brushing, flossing, and dental visits. Injury prevention: discussed safety/seat belts, safety helmets, smoke detectors, carbon dioxide detectors, and smoking near bedding or upholstery. Sexual health: discussed sexually transmitted diseases, partner selection, use of condoms, avoidance of unintended pregnancy, and contraceptive alternatives. Exercise: the importance of regular exercise/physical activity was discussed. Recommend exercise 3-5 times per week for at least 30 minutes. BMI Counseling: Body mass index is 27.91 kg/m². The BMI is above normal. Nutrition recommendations include decreasing portion sizes and encouraging healthy choices of fruits and vegetables. Exercise recommendations include exercising 3-5 times per week. Rationale for BMI follow-up plan is due to patient being overweight or obese. Depression Screening and Follow-up Plan: Patient was screened for depression during today's encounter. They screened negative with a PHQ-2 score of 0.         Return in about 1 year (around 2024) for Annual physical.     Chief Complaint:     Chief Complaint   Patient presents with    Physical Exam      History of Present Illness:     Adult Annual Physical   Patient here for a comprehensive physical exam. The patient reports no problems. Diet and Physical Activity  Diet/Nutrition: well balanced diet. Exercise: no formal exercise. Depression Screening  PHQ-2/9 Depression Screening    Little interest or pleasure in doing things: 0 - not at all  Feeling down, depressed, or hopeless: 0 - not at all  PHQ-2 Score: 0  PHQ-2 Interpretation: Negative depression screen       General Health  Sleep: sleeps well. Hearing: normal - bilateral.  Vision: goes for regular eye exams. Dental: regular dental visits.  Health  Symptoms include: none    Advanced Care Planning  Do you have an advanced directive? yes  Do you have a durable medical power of ? yes     Review of Systems:     Review of Systems   Constitutional:  Negative for activity change, appetite change, chills, diaphoresis, fatigue and fever. HENT:  Negative for congestion, dental problem, drooling, ear discharge, ear pain, facial swelling, hearing loss, mouth sores, postnasal drip, rhinorrhea, sinus pressure, sinus pain, sneezing, sore throat, tinnitus, trouble swallowing and voice change. Eyes:  Negative for photophobia, pain, discharge, redness, itching and visual disturbance. Respiratory:  Negative for apnea, cough, choking, chest tightness, shortness of breath, wheezing and stridor. Cardiovascular:  Negative for chest pain, palpitations and leg swelling. Gastrointestinal:  Negative for abdominal distention, abdominal pain, anal bleeding, blood in stool, constipation, diarrhea, nausea, rectal pain and vomiting. Endocrine: Negative for cold intolerance, heat intolerance, polydipsia, polyphagia and polyuria.    Genitourinary:  Negative for decreased urine volume, difficulty urinating, dysuria, flank pain, frequency, hematuria, penile pain and urgency. Musculoskeletal:  Negative for arthralgias, back pain, gait problem, joint swelling, myalgias, neck pain and neck stiffness. Skin:  Negative for color change, pallor, rash and wound. Neurological:  Negative for dizziness, tremors, seizures, syncope, facial asymmetry, speech difficulty, weakness, light-headedness, numbness and headaches. Hematological:  Negative for adenopathy. Does not bruise/bleed easily. Psychiatric/Behavioral:  Negative for agitation, behavioral problems, confusion, decreased concentration, dysphoric mood, hallucinations, self-injury, sleep disturbance and suicidal ideas. The patient is not nervous/anxious and is not hyperactive. Past Medical History:     Past Medical History:   Diagnosis Date    Disease of thyroid gland       Past Surgical History:     Past Surgical History:   Procedure Laterality Date    LIPOMA RESECTION Bilateral 2013      Family History:     Family History   Problem Relation Age of Onset    Hypertension Mother     Diabetes Mother     Glaucoma Father       Social History:     Social History     Socioeconomic History    Marital status: /Civil Union     Spouse name: None    Number of children: None    Years of education: None    Highest education level: None   Occupational History    None   Tobacco Use    Smoking status: Never    Smokeless tobacco: Never   Vaping Use    Vaping Use: Never used   Substance and Sexual Activity    Alcohol use:  Yes     Alcohol/week: 2.0 standard drinks of alcohol     Types: 2 Shots of liquor per week     Comment: 2 drinks/month    Drug use: Never    Sexual activity: Yes   Other Topics Concern    None   Social History Narrative    None     Social Determinants of Health     Financial Resource Strain: Not on file   Food Insecurity: Not on file   Transportation Needs: Not on file   Physical Activity: Not on file   Stress: Not on file   Social Connections: Not on file   Intimate Partner Violence: Not on file Housing Stability: Not on file      Current Medications:     Current Outpatient Medications   Medication Sig Dispense Refill    levothyroxine 75 mcg tablet Take 1 tablet (75 mcg total) by mouth daily 90 tablet 0    omeprazole (PriLOSEC) 40 MG capsule Take 1 capsule (40 mg total) by mouth daily 90 capsule 3    EPINEPHrine (EPIPEN) 0.3 mg/0.3 mL SOAJ Inject 0.3 mL (0.3 mg total) into a muscle once for 1 dose 0.6 mL 0     No current facility-administered medications for this visit. Allergies: Allergies   Allergen Reactions    Pollen Extract Allergic Rhinitis and Shortness Of Breath     Other reaction(s): Sneezing  Other reaction(s): Sneezing        Physical Exam:     /84 (BP Location: Left arm, Patient Position: Sitting, Cuff Size: Standard)   Pulse 65   Temp (!) 94.5 °F (34.7 °C) (Tympanic)   Wt 85.7 kg (189 lb)   SpO2 98%   BMI 27.91 kg/m²     Physical Exam  Vitals and nursing note reviewed. Constitutional:       General: He is not in acute distress. Appearance: Normal appearance. He is well-developed and normal weight. He is not ill-appearing, toxic-appearing or diaphoretic. HENT:      Head: Normocephalic and atraumatic. Right Ear: Tympanic membrane, ear canal and external ear normal. There is no impacted cerumen. Left Ear: Tympanic membrane, ear canal and external ear normal. There is no impacted cerumen. Nose: Nose normal. No congestion or rhinorrhea. Mouth/Throat:      Mouth: Mucous membranes are moist.      Pharynx: Oropharynx is clear. No oropharyngeal exudate or posterior oropharyngeal erythema. Eyes:      General:         Right eye: No discharge. Left eye: No discharge. Extraocular Movements: Extraocular movements intact. Conjunctiva/sclera: Conjunctivae normal.      Pupils: Pupils are equal, round, and reactive to light. Neck:      Vascular: No carotid bruit. Cardiovascular:      Rate and Rhythm: Normal rate and regular rhythm. Heart sounds: Normal heart sounds. No murmur heard. No friction rub. No gallop. Pulmonary:      Effort: Pulmonary effort is normal. No respiratory distress. Breath sounds: Normal breath sounds. No stridor. No wheezing, rhonchi or rales. Chest:      Chest wall: No tenderness. Abdominal:      General: Bowel sounds are normal. There is no distension. Palpations: Abdomen is soft. There is no mass. Tenderness: There is no abdominal tenderness. There is no right CVA tenderness, left CVA tenderness, guarding or rebound. Hernia: No hernia is present. Musculoskeletal:         General: No swelling, tenderness, deformity or signs of injury. Normal range of motion. Cervical back: Normal range of motion and neck supple. No rigidity or tenderness. Right lower leg: No edema. Left lower leg: No edema. Lymphadenopathy:      Cervical: No cervical adenopathy. Skin:     General: Skin is warm and dry. Capillary Refill: Capillary refill takes less than 2 seconds. Coloration: Skin is not jaundiced or pale. Findings: No bruising, erythema, lesion or rash. Neurological:      General: No focal deficit present. Mental Status: He is alert and oriented to person, place, and time. Cranial Nerves: No cranial nerve deficit. Sensory: No sensory deficit. Motor: No weakness. Coordination: Coordination normal.      Gait: Gait normal.      Deep Tendon Reflexes: Reflexes normal.   Psychiatric:         Mood and Affect: Mood normal.         Behavior: Behavior normal.         Thought Content:  Thought content normal.         Judgment: Judgment normal.          Elizabeth Cruz, [address]

## 2023-12-05 DIAGNOSIS — Z00.6 ENCOUNTER FOR EXAMINATION FOR NORMAL COMPARISON OR CONTROL IN CLINICAL RESEARCH PROGRAM: ICD-10-CM

## 2024-01-08 ENCOUNTER — APPOINTMENT (OUTPATIENT)
Dept: LAB | Facility: HOSPITAL | Age: 44
End: 2024-01-08

## 2024-01-08 DIAGNOSIS — Z00.6 ENCOUNTER FOR EXAMINATION FOR NORMAL COMPARISON OR CONTROL IN CLINICAL RESEARCH PROGRAM: ICD-10-CM

## 2024-01-08 PROCEDURE — 36415 COLL VENOUS BLD VENIPUNCTURE: CPT

## 2024-01-30 LAB
APOB+LDLR+PCSK9 GENE MUT ANL BLD/T: NOT DETECTED
BRCA1+BRCA2 DEL+DUP + FULL MUT ANL BLD/T: NOT DETECTED
MLH1+MSH2+MSH6+PMS2 GN DEL+DUP+FUL M: NOT DETECTED

## 2024-02-27 ENCOUNTER — TELEPHONE (OUTPATIENT)
Dept: BARIATRICS | Facility: CLINIC | Age: 44
End: 2024-02-27

## 2024-02-27 NOTE — TELEPHONE ENCOUNTER
Called patient and left a message to give the office a call back to schedule a NP appointment with a [facility] Provider. Patient did the Non- Sx info Seminar and was interested in VLCD. He asked to be schedule at the [place] Office.

## 2024-03-13 ENCOUNTER — OFFICE VISIT (OUTPATIENT)
Dept: BARIATRICS | Facility: CLINIC | Age: 44
End: 2024-03-13
Payer: COMMERCIAL

## 2024-03-13 VITALS
BODY MASS INDEX: 28.71 KG/M2 | DIASTOLIC BLOOD PRESSURE: 100 MMHG | WEIGHT: 193.8 LBS | SYSTOLIC BLOOD PRESSURE: 140 MMHG | HEIGHT: 69 IN | HEART RATE: 74 BPM

## 2024-03-13 DIAGNOSIS — E66.3 OVERWEIGHT (BMI 25.0-29.9): Primary | ICD-10-CM

## 2024-03-13 PROCEDURE — 99204 OFFICE O/P NEW MOD 45 MIN: CPT | Performed by: INTERNAL MEDICINE

## 2024-03-13 RX ORDER — ELECTROLYTES/DEXTROSE
1 SOLUTION, ORAL ORAL DAILY
COMMUNITY

## 2024-03-13 NOTE — ASSESSMENT & PLAN NOTE
-Discussed care plan with the patient  -Reviewed VLCD diet with the patient as full meal replacement medically monitored [address], resulting in rapid weight loss 4 to 5 pounds a week and the need for frequent labs monthly and biweekly follow-up with RD.  -Reviewed the importance of transition meal plan and maintaining/intensifying lifestyle measures while transitioning to regular food based diet as critical to prevent or mitigate weight regain  -Patient understands the above and is in agreement  -Also reviewed healthy core and conservative plan with follow-up with the dietitian.  Patient stated he is interested in transitioning over to either of those plans to continue working with the dietitian after he transitions off the VLCD  -Recommended patient continue with gentle walks while on VL CD and we can intensify his lifestyle measures after he transitions to a food-based program and when he can get to add in more calories to support his workout routine  -Also discussed antiobesity pharmacotherapy at length.  Per patient report he tried Saxenda for 2 weeks in 2022 and developed vomiting even when he was not eating so he stopped it.  He felt the nausea with Ozempic was better but he was not willing at the time to commit to a long-term medication for life so he stopped it as well.  Did discuss Zepbound with him and the fact that we cannot use antiobesity pharmacotherapy while on VLCD.  Currently patient's BMI is in the overweight category with a comorbidity of hyperlipidemia.  We could reconsider this or other oral agents which were discussed with the patient.  Today his blood pressure was elevated at 148/100.  Discussed with patient that we will not be able to use phentermine unless his blood pressure is monitored at home with reported control and stability.  Patient reports that normally it runs in the one teens systolic.  Other oral options Contrave Qsymia or their generic components could be considered along with  metformin

## 2024-03-13 NOTE — PROGRESS NOTES
Assessment/Plan     Rayne Irizarry is 43 y.o. year old male  who comes in for consultation for assistance with weight management.     - Discussed options of HealthyCORE-Intensive Lifestyle Intervention Program, Very Low Calorie Diet-VLCD, and Conservative Program and the role of weight loss medications.  - Patient is interested in pursuing Very Low Calorie Diet-VLCD    Overweight (BMI 25.0-29.9)  -Discussed care plan with the patient  -Reviewed VLCD diet with the patient as full meal replacement medically monitored 800 julio, resulting in rapid weight loss 4 to 5 pounds a week and the need for frequent labs monthly and biweekly follow-up with RD.  -Reviewed the importance of transition meal plan and maintaining/intensifying lifestyle measures while transitioning to regular food based diet as critical to prevent or mitigate weight regain  -Patient understands the above and is in agreement  -Also reviewed healthy core and conservative plan with follow-up with the dietitian.  Patient stated he is interested in transitioning over to either of those plans to continue working with the dietitian after he transitions off the VLCD  -Recommended patient continue with gentle walks while on VL CD and we can intensify his lifestyle measures after he transitions to a food-based program and when he can get to add in more calories to support his workout routine  -Also discussed antiobesity pharmacotherapy at length.  Per patient report he tried Saxenda for 2 weeks in 2022 and developed vomiting even when he was not eating so he stopped it.  He felt the nausea with Ozempic was better but he was not willing at the time to commit to a long-term medication for life so he stopped it as well.  Did discuss Zepbound with him and the fact that we cannot use antiobesity pharmacotherapy while on VLCD.  Currently patient's BMI is in the overweight category with a comorbidity of hyperlipidemia.  We could reconsider this or other oral agents which  were discussed with the patient.  Today his blood pressure was elevated at 148/100.  Discussed with patient that we will not be able to use phentermine unless his blood pressure is monitored at home with reported control and stability.  Patient reports that normally it runs in the one teens systolic.  Other oral options Contrave Qsymia or their generic components could be considered along with metformin    Rayne was seen today for consult.    Diagnoses and all orders for this visit:    Overweight (BMI 25.0-29.9)  -     ECG 12 lead; Future  -     CBC and differential; Future  -     Comprehensive metabolic panel; Future  -     Hemoglobin A1C; Future  -     Insulin, fasting; Future  -     Lipid panel; Future  -     Magnesium; Future  -     T3, free; Future  -     Uric acid; Future  -     Vitamin D 25 hydroxy; Future    VLCD Review:  Contraindications: no  Labs ordered: yes  EKG ordered: yes  HTN meds addressed: n/a  DM2 meds addressed: n/a  VLCD time restriction based on BMI: no  LMP/OCP: n/a   -BMI in the overweight category at 29.0 kg/m² with hyperlipidemia    -In addition, please follow general recommendations below.          Return visit:  2 months         General Lifestyle recommendations:    Nutrition   -Avoid skipping meals. Avoid sugary beverages. At least 64oz of water daily.  Limit processed food, refined sugars and grain. Encourage  healthy choices for meals and snacks   -Focus on protein goals and non starchy fiber rich vegetables for satiety effect and to help support a calorie deficit.   - Emphasize portion control, well balanced macronutrient's (protein, carbohydrate, fat using MyPlate method )and adequate protein with each meal/snacks and distributing calories equally throughout the day along with.   -Advise starting the day with a protein breakfast   Behavioral/Stress   Food log via mat or provided paper log (mat options include www.PropertyGurupal.com, sparkpeople.com, loseit.com, calorieking.com,  "baritastic). Encouraged mindful eating. Be sure to set aside time to eat, eat slowly, and savor your food. Consider meditation apps and/or taking a few minutes of mindfulness every AM. Understand the role of regarding the role of stress hormone cortisol in promoting weight gain and visceral fat accumulation. Weigh daily or atleast 2-3 times/ week  Physical Activity   Increase physical activity by 10 minutes daily. Gradually increase physical activity to a goal of 5 days per week for 30 minutes of MODERATE intensity ( should be able to pass the \"talk test\" but should not be able to sing. Target 150-300 minutes  PLUS 2 days per week of FULL BODY resistance training. Progression will be addressed at follow up visits. Encouraged contemplation regarding establishing a daily physical activity routine  - Resistance training along with increase protein intake is important to maintain and enhance metabolism  Sleep   Encourage sleep hygiene and importance of having adequate sleep duration at least > 6 hours to support response in weight loss efforts    Handouts provided :  [facility] program [facility]  MyPlate and food quality  Food log resources, phone mat or paper journal  Antiobesity medications options     - Discussed at length and the role of weight loss medications and medication options   - Explained the importance of making lifestyle changes in addition to starting any anti-obesity medications if the  patient chooses.  -  Initial weight loss goal of 5-10% weight loss for improved health  - Weight loss can improve patient's co-morbid conditions and/or prevent weight-related complications.  - Weight is not at goal and patient has been unable to achieve a meaningful weight loss above 5% using various programs and tools for more than 6 months    Rayne was seen today for consult.    Diagnoses and all orders for this visit:    Overweight (BMI 25.0-29.9)  -     ECG 12 lead; Future  -     CBC and differential; Future  -    "  Comprehensive metabolic panel; Future  -     Hemoglobin A1C; Future  -     Insulin, fasting; Future  -     Lipid panel; Future  -     Magnesium; Future  -     T3, free; Future  -     Uric acid; Future  -     Vitamin D 25 hydroxy; Future                      Total time spent reviewing chart, interviewing patient, examining patient, discussing plan, answering all questions, and documentin min, with >50% face-to-face time spent counseling patient on nonsurgical interventions for the treatment of excess weight. Discussed in detail nonsurgical options including intensive lifestyle intervention program, very low-calorie diet program and conservative program.  Discussed the role of weight loss medications.  Counseled patient on diet behavior and exercise modification for weight loss.            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  Psycho social- wife 14 and 12        Weight history     Start date: 24  Current weight : 193 lbs  Current BMI: 29.04 kg/m2  Obesity Class: 25.0-29.9- Overweight  Goal weight: 193 lbs    Onset--180 stable weight, 176  with saxenda which she used for 2 weeks  Food behaviorsboredom eating, snacking/grazing, and struggle controlling portions- recently he reports headache when he tries to skip breakfast and eat at 11:30 AM , never had , usually used to fast on -but unable to do so due to headache  Weight loss medications tried: saxenda was at [facility] for  2 weeks oct- Dec ; started with [facility] in Sep, nausea, thro up  stopped it , weekly Ozempic better than saxenda didn't want to comiit to long term  Was looking at keto- blood work was fattylver     Patient reports and other history-  -feels his downfall is heavy carb based diet due to his [place] heritage; he is a meat eater  -Reports that one of his challenges is black-and-white thinking  -Patient reports he used to work with [employer] in the past and has tried working with a dietitian there meat eater   Per patient report he tried Saxenda for 2 weeks in 2022 and developed vomiting even when he was not eating so he stopped it  Wt Readings from Last 20 Encounters:   03/13/24 87.9 kg (193 lb 12.8 oz)   12/04/23 85.7 kg (189 lb)   11/10/23 81.6 kg (180 lb)   11/01/23 86.3 kg (190 lb 3.2 oz)   10/05/23 84.8 kg (187 lb)   09/26/23 83.5 kg (184 lb)           Medication considerations/contraindications     -Patient denies personal history of pancreatitis. Patient also denies personal and family history of medullary thyroid cancer and multiple endocrine neoplasia type 2 (MEN 2 tumor). -Patient denies any history of kidney stones, seizures, or glaucoma, diabetic retinopathy, gall bladder disease, hyperthyroidism.  -Denies Hx of CAD, PAD, palpitations, arrhythmia.   -Denies uncontrolled anxiety or depression, suicidal behavior or thinking , insomnia or sleep disturbance.         Past medical history/past surgical history       Previous notes and records have been reviewed.    The following portions of the patient's history were reviewed and updated as appropriate: allergies, current medications, past family history, past medical history, past social history, past surgical history, and problem list.    Past Medical History:   Diagnosis Date    Disease of thyroid gland          Past Surgical History:   Procedure Laterality Date    LIPOMA RESECTION Bilateral 2013             Family History   Problem Relation Age of Onset    Hypertension Mother     Diabetes Mother     Glaucoma Father      "        Objective     /100 (BP Location: Right arm, Patient Position: Sitting, Cuff Size: Large)   Pulse 74   Ht 5' 8.5\" (1.74 m)   Wt 87.9 kg (193 lb 12.8 oz)   BMI 29.04 kg/m²       Review of Systems   Constitutional:  Negative for fatigue.   HENT:  Negative for sore throat.    Respiratory:  Negative for cough and shortness of breath.    Cardiovascular:  Negative for chest pain, palpitations and leg swelling.   Gastrointestinal:  Negative for abdominal pain, constipation, diarrhea and nausea.   Genitourinary:  Negative for dysuria.   Musculoskeletal:  Negative for arthralgias and back pain.   Skin:  Negative for rash.   Neurological:  Negative for headaches.   Psychiatric/Behavioral:  Negative for dysphoric mood. The patient is not nervous/anxious.        Physical Exam  Vitals and nursing note reviewed.   Constitutional:       Appearance: Normal appearance.   HENT:      Head: Normocephalic.   Pulmonary:      Effort: Pulmonary effort is normal.   Neurological:      General: No focal deficit present.      Mental Status: He is alert and oriented to person, place, and time.   Psychiatric:         Mood and Affect: Mood normal.         Behavior: Behavior normal.         Thought Content: Thought content normal.         Judgment: Judgment normal.              Screening       Colonoscopy: UTD        Medications       Current Outpatient Medications:     levothyroxine 75 mcg tablet, Take 1 tablet (75 mcg total) by mouth daily, Disp: 90 tablet, Rfl: 0    Multiple Vitamin (Multivitamin Adult) TABS, Take 1 tablet by mouth in the morning, Disp: , Rfl:     omeprazole (PriLOSEC) 40 MG capsule, Take 1 capsule (40 mg total) by mouth daily, Disp: 90 capsule, Rfl: 3           Labs and imaging     Recent labs and imaging have been personally reviewed.  No results found for: \"WBC\", \"HGB\", \"HCT\", \"MCV\", \"PLT\"  Lab Results   Component Value Date    SODIUM 138 11/16/2023    K 4.1 11/16/2023     11/16/2023    CO2 28 " 11/16/2023    AGAP 5 11/16/2023    BUN 8 11/16/2023    CREATININE 1.03 11/16/2023    GLUF 74 11/16/2023    CALCIUM 9.2 11/16/2023    AST 22 11/16/2023    ALT 23 11/16/2023    ALKPHOS 62 11/16/2023    TP 7.2 11/16/2023    TBILI 0.55 11/16/2023    EGFR 87 11/16/2023     Lab Results   Component Value Date    HGBA1C 5.6 11/16/2023     Lab Results   Component Value Date    CYU2GJGWNTIS 1.084 11/16/2023     Lab Results   Component Value Date    CHOLESTEROL 216 (H) 11/16/2023     Lab Results   Component Value Date    HDL 35 (L) 11/16/2023     Lab Results   Component Value Date    TRIG 95 11/16/2023     Lab Results   Component Value Date    LDLCALC 162 (H) 11/16/2023

## 2024-04-01 ENCOUNTER — OFFICE VISIT (OUTPATIENT)
Dept: GASTROENTEROLOGY | Facility: CLINIC | Age: 44
End: 2024-04-01
Payer: COMMERCIAL

## 2024-04-01 VITALS
WEIGHT: 196.6 LBS | SYSTOLIC BLOOD PRESSURE: 126 MMHG | BODY MASS INDEX: 29.12 KG/M2 | DIASTOLIC BLOOD PRESSURE: 82 MMHG | HEIGHT: 69 IN

## 2024-04-01 DIAGNOSIS — Z12.11 SCREENING FOR COLON CANCER: ICD-10-CM

## 2024-04-01 DIAGNOSIS — R13.19 ESOPHAGEAL DYSPHAGIA: Primary | ICD-10-CM

## 2024-04-01 DIAGNOSIS — K22.10 EROSIVE ESOPHAGITIS: ICD-10-CM

## 2024-04-01 PROCEDURE — 99214 OFFICE O/P EST MOD 30 MIN: CPT | Performed by: INTERNAL MEDICINE

## 2024-04-01 NOTE — PROGRESS NOTES
[facility] - Outpatient Follow-up Note  Rayne Irizarry 43 y.o. male MRN: [id number]  Encounter: [id number]    ASSESSMENT AND PLAN:      1. Esophageal dysphagia  2. Erosive esophagitis  Upper endoscopy October 2023 showed grade C erosive esophagitis with stricturing.  Epigastric pain has resolved with omeprazole.  Dysphagia resolved but then recurred within the past month.  He also has right-sided neck pain after sneezing that feels like a muscle spasm    Discussed options including motility testing..  Will start with a barium swallow to assess for recurrence of stricture.  He will continue omeprazole in the interim    3.  H. pylori gastritis  Treated with quadruple therapy.  We will plan breath test for H. pylori to confirm eradication, will defer until we can taper the PPI    3. Screening for colon cancer  Negative colonoscopy October 2023, 10-year recall      Followup Appointment: Barium swallow now, office 6 months  ______________________________________________________________________    Chief Complaint   Patient presents with    Follow-up     Pt states since his procedure every time he sneezes his experiences pain on the R side of his neck. He is also having difficulty swallowing again.      HPI: The patient returns for follow-up on epigastric pain, with recurrence of dysphagia.  He was last seen at time of EGD and colonoscopy in November.  This showed grade C erosive esophagitis with stricturing.  This was dilated with resolution of dysphagia.  Month ago he began to experience recurrent swallowing difficulty with solids.  It does not occur with every meal.  It does not occur with liquids or pills.  Since the procedure he feels a muscle twitching in his right neck that typically occurs after sneezing, and last for about 5 minutes.  Any lower GI complaints.    Historical Information   Past Medical History:   Diagnosis Date    Disease of thyroid gland      Past Surgical History:  "  Procedure Laterality Date    LIPOMA RESECTION Bilateral 2013     Social History     Substance and Sexual Activity   Alcohol Use Not Currently    Alcohol/week: 2.0 standard drinks of alcohol    Comment: 2 drinks/month     Social History     Substance and Sexual Activity   Drug Use Never     Social History     Tobacco Use   Smoking Status Never    Passive exposure: Never   Smokeless Tobacco Never     Family History   Problem Relation Age of Onset    Hypertension Mother     Diabetes Mother     Glaucoma Father     No Known Problems Maternal Grandmother     No Known Problems Maternal Grandfather     No Known Problems Paternal Grandmother     No Known Problems Paternal Grandfather          Current Outpatient Medications:     levothyroxine 75 mcg tablet    Multiple Vitamin (Multivitamin Adult) TABS    omeprazole (PriLOSEC) 40 MG capsule  Allergies   Allergen Reactions    Pollen Extract Allergic Rhinitis and Shortness Of Breath     Other reaction(s): Sneezing  Other reaction(s): Sneezing       Reviewed medications and allergies and updated as indicated    PHYSICAL EXAM:    Blood pressure 126/82, height 5' 8.5\" (1.74 m), weight 89.2 kg (196 lb 9.6 oz). Body mass index is 29.46 kg/m².  General Appearance: NAD, cooperative, alert  Eyes: Anicteric, conjunctiva pink  ENT:  Normocephalic, atraumatic, normal mucosa.    Neck:  Supple, symmetrical, trachea midline  Resp:  Clear to auscultation bilaterally; no rales, rhonchi or wheezing; respirations unlabored   CV:  S1 S2, Regular rate and rhythm; no murmur, rub, or gallop.  GI:  Soft, non-tender, non-distended; normal bowel sounds; no masses, no organomegaly   Rectal: Deferred  Musculoskeletal: No cyanosis, clubbing or edema. Normal ROM.  Skin:  No jaundice, rashes, or lesions   Heme/Lymph: No palpable cervical lymphadenopathy  Psych: Normal affect, good eye contact  Neuro: No gross deficits, AAOx3    Lab Results:   No results found for: \"WBC\", \"HGB\", \"HCT\", \"MCV\", \"PLT\"  Lab " Results   Component Value Date    K 4.1 11/16/2023     11/16/2023    CO2 28 11/16/2023    BUN 8 11/16/2023    CREATININE 1.03 11/16/2023    GLUF 74 11/16/2023    CALCIUM 9.2 11/16/2023    AST 22 11/16/2023    ALT 23 11/16/2023    ALKPHOS 62 11/16/2023    EGFR 87 11/16/2023       Radiology Results:   No results found.

## 2024-04-16 ENCOUNTER — HOSPITAL ENCOUNTER (OUTPATIENT)
Dept: NON INVASIVE DIAGNOSTICS | Facility: HOSPITAL | Age: 44
Discharge: HOME | End: 2024-04-16
Attending: INTERNAL MEDICINE
Payer: COMMERCIAL

## 2024-04-16 DIAGNOSIS — E03.9 ACQUIRED HYPOTHYROIDISM: ICD-10-CM

## 2024-04-16 DIAGNOSIS — E66.3 OVERWEIGHT (BMI 25.0-29.9): ICD-10-CM

## 2024-04-16 LAB
ATRIAL RATE: 62 BPM
P AXIS: 64 DEGREES
PR INTERVAL: 124 MS
QRS AXIS: 61 DEGREES
QRSD INTERVAL: 112 MS
QT INTERVAL: 404 MS
QTC INTERVAL: 410 MS
T WAVE AXIS: 86 DEGREES
VENTRICULAR RATE: 62 BPM

## 2024-04-16 PROCEDURE — 93005 ELECTROCARDIOGRAM TRACING: CPT

## 2024-04-16 PROCEDURE — 93010 ELECTROCARDIOGRAM REPORT: CPT | Performed by: INTERNAL MEDICINE

## 2024-04-16 RX ORDER — LEVOTHYROXINE SODIUM 0.07 MG/1
75 TABLET ORAL DAILY
Qty: 90 TABLET | Refills: 0 | Status: SHIPPED | OUTPATIENT
Start: 2024-04-16

## 2024-04-19 ENCOUNTER — COSMETIC (OUTPATIENT)
Dept: PLASTIC SURGERY | Facility: CLINIC | Age: 44
End: 2024-04-19

## 2024-04-19 VITALS
HEART RATE: 66 BPM | WEIGHT: 185 LBS | TEMPERATURE: 98.2 F | HEIGHT: 68 IN | BODY MASS INDEX: 28.04 KG/M2 | SYSTOLIC BLOOD PRESSURE: 146 MMHG | DIASTOLIC BLOOD PRESSURE: 101 MMHG

## 2024-04-19 DIAGNOSIS — Z41.1 ENCOUNTER FOR COSMETIC SURGERY: Primary | ICD-10-CM

## 2024-04-19 PROCEDURE — COSCON COSMETIC CONSULTATION: Performed by: STUDENT IN AN ORGANIZED HEALTH CARE EDUCATION/TRAINING PROGRAM

## 2024-04-19 NOTE — PROGRESS NOTES
Plastic Surgery Consult    Reason for visit: gynecomastia    HPI from 4/19/24  Patient is a pleasant 44 y/o [employer] employee who presents with bilateral chest gynecomastia which he states he has had for much of his life. He denies anabolic steroids, marijuana, or any other medications except for levothyroxone for hypothyroid and his levels have remained within normal limits. He presents for further recommendations and treatment options.     Of note, he has consultation with weight management for new diet regimen and looking to lose weight.    Patient denies any testicular masses.    ROS: 12 pt ROS negative, except as otherwise noted in HPI    Past Medical History:   Diagnosis Date    Disease of thyroid gland        FamHx: non-contrib  SurgHx: multiple lipomas, no chest surgery  SocHx: no tobacco, no nicotine, +social ETOH, no marijuana  Meds: levothyroxone, no blood thinners, no steroids  Allergies: pollen, NKDA    PE:    Vitals:    04/19/24 0910   BP: (!) 146/101   Pulse: 66   Temp: 98.2 °F (36.8 °C)       General: NC/AT, breathing comfortably on RA  Neuro: CN II-XII grossly intact, symmetric reflexes  HEENT: PERRLA, EOMI, external ears normal, no lesions or deformities, neck supple, trachea midline  Respiratory: CTAB, normal respiratory effort  Cardio: RRR, normal S1, S2, no murmur, rubs, gallops  GI: soft, non-tender, non-distended  Extremities/MSK: normal alignment, mobility, gait, no edema  Skin: no rashes, lesions, subcutaneous nodules    BMI 28.1  Bilateral mild to moderate gynecomastia  Palpable breast tissue in the subareolar region  No abnormal discharge, masses, skin dimpling  Smaller-sized areolas    A/P: 44 y/o male who presents with bilateral chest gynecomastia  -Discussed treatment with incision via infraareolar incision vs, complete areolar incision depending on the amount of skin needed to be resected followed by direct excision of breast tissue and liposuction for contour. Discussed usage of  drains and need for compression.   -Discussed risks, complications, including infection, bleeding, scarring, contour deformity, and asymmetry. Patient acknowledged.  -Will email quote. Discussed possible out-of-pocket cost for pathology of specimen  -Patient is planning weight loss with new diet, instructed to pursue weight loss regimen and to call us when weight has plateaued for gynecomastia surgery    Carter Robert MD   [facility]   [address] [place] [address], [address]   [place] [address]   Office: [phone]

## 2024-04-26 ENCOUNTER — APPOINTMENT (OUTPATIENT)
Dept: LAB | Facility: HOSPITAL | Age: 44
End: 2024-04-26
Payer: COMMERCIAL

## 2024-04-26 DIAGNOSIS — E66.3 OVERWEIGHT (BMI 25.0-29.9): ICD-10-CM

## 2024-04-26 LAB
25(OH)D3 SERPL-MCNC: 14.6 NG/ML (ref 30–100)
ALBUMIN SERPL BCP-MCNC: 4.1 G/DL (ref 3.5–5)
ALP SERPL-CCNC: 68 U/L (ref 34–104)
ALT SERPL W P-5'-P-CCNC: 23 U/L (ref 7–52)
ANION GAP SERPL CALCULATED.3IONS-SCNC: 7 MMOL/L (ref 4–13)
AST SERPL W P-5'-P-CCNC: 20 U/L (ref 13–39)
BASOPHILS # BLD AUTO: 0.04 THOUSANDS/ÂΜL (ref 0–0.1)
BASOPHILS NFR BLD AUTO: 1 % (ref 0–1)
BILIRUB SERPL-MCNC: 0.54 MG/DL (ref 0.2–1)
BUN SERPL-MCNC: 14 MG/DL (ref 5–25)
CALCIUM SERPL-MCNC: 9.6 MG/DL (ref 8.4–10.2)
CHLORIDE SERPL-SCNC: 104 MMOL/L (ref 96–108)
CHOLEST SERPL-MCNC: 217 MG/DL
CO2 SERPL-SCNC: 27 MMOL/L (ref 21–32)
CREAT SERPL-MCNC: 1.04 MG/DL (ref 0.6–1.3)
EOSINOPHIL # BLD AUTO: 0.22 THOUSAND/ÂΜL (ref 0–0.61)
EOSINOPHIL NFR BLD AUTO: 4 % (ref 0–6)
ERYTHROCYTE [DISTWIDTH] IN BLOOD BY AUTOMATED COUNT: 13.3 % (ref 11.6–15.1)
EST. AVERAGE GLUCOSE BLD GHB EST-MCNC: 111 MG/DL
GFR SERPL CREATININE-BSD FRML MDRD: 86 ML/MIN/1.73SQ M
GLUCOSE P FAST SERPL-MCNC: 71 MG/DL (ref 65–99)
HBA1C MFR BLD: 5.5 %
HCT VFR BLD AUTO: 48.6 % (ref 36.5–49.3)
HDLC SERPL-MCNC: 37 MG/DL
HGB BLD-MCNC: 15.9 G/DL (ref 12–17)
IMM GRANULOCYTES # BLD AUTO: 0.01 THOUSAND/UL (ref 0–0.2)
IMM GRANULOCYTES NFR BLD AUTO: 0 % (ref 0–2)
INSULIN SERPL-ACNC: 39.89 UIU/ML (ref 1.9–23)
LDLC SERPL CALC-MCNC: 146 MG/DL (ref 0–100)
LYMPHOCYTES # BLD AUTO: 2.3 THOUSANDS/ÂΜL (ref 0.6–4.47)
LYMPHOCYTES NFR BLD AUTO: 39 % (ref 14–44)
MAGNESIUM SERPL-MCNC: 2.1 MG/DL (ref 1.9–2.7)
MCH RBC QN AUTO: 28.8 PG (ref 26.8–34.3)
MCHC RBC AUTO-ENTMCNC: 32.7 G/DL (ref 31.4–37.4)
MCV RBC AUTO: 88 FL (ref 82–98)
MONOCYTES # BLD AUTO: 0.42 THOUSAND/ÂΜL (ref 0.17–1.22)
MONOCYTES NFR BLD AUTO: 7 % (ref 4–12)
NEUTROPHILS # BLD AUTO: 2.96 THOUSANDS/ÂΜL (ref 1.85–7.62)
NEUTS SEG NFR BLD AUTO: 49 % (ref 43–75)
NONHDLC SERPL-MCNC: 180 MG/DL
NRBC BLD AUTO-RTO: 0 /100 WBCS
PLATELET # BLD AUTO: 237 THOUSANDS/UL (ref 149–390)
PMV BLD AUTO: 10.9 FL (ref 8.9–12.7)
POTASSIUM SERPL-SCNC: 4 MMOL/L (ref 3.5–5.3)
PROT SERPL-MCNC: 7.5 G/DL (ref 6.4–8.4)
RBC # BLD AUTO: 5.53 MILLION/UL (ref 3.88–5.62)
SODIUM SERPL-SCNC: 138 MMOL/L (ref 135–147)
T3FREE SERPL-MCNC: 3.44 PG/ML (ref 2.5–3.9)
TRIGL SERPL-MCNC: 170 MG/DL
URATE SERPL-MCNC: 4.8 MG/DL (ref 3.5–8.5)
WBC # BLD AUTO: 5.95 THOUSAND/UL (ref 4.31–10.16)

## 2024-04-26 PROCEDURE — 84481 FREE ASSAY (FT-3): CPT

## 2024-04-26 PROCEDURE — 83735 ASSAY OF MAGNESIUM: CPT

## 2024-04-26 PROCEDURE — 36415 COLL VENOUS BLD VENIPUNCTURE: CPT

## 2024-04-26 PROCEDURE — 82306 VITAMIN D 25 HYDROXY: CPT

## 2024-04-26 PROCEDURE — 83036 HEMOGLOBIN GLYCOSYLATED A1C: CPT

## 2024-04-26 PROCEDURE — 84550 ASSAY OF BLOOD/URIC ACID: CPT

## 2024-04-26 PROCEDURE — 85025 COMPLETE CBC W/AUTO DIFF WBC: CPT

## 2024-04-26 PROCEDURE — 83525 ASSAY OF INSULIN: CPT

## 2024-04-26 PROCEDURE — 80053 COMPREHEN METABOLIC PANEL: CPT

## 2024-04-26 PROCEDURE — 80061 LIPID PANEL: CPT

## 2024-05-01 ENCOUNTER — OFFICE VISIT (OUTPATIENT)
Dept: BARIATRICS | Facility: CLINIC | Age: 44
End: 2024-05-01
Payer: COMMERCIAL

## 2024-05-01 VITALS
HEIGHT: 69 IN | BODY MASS INDEX: 29.18 KG/M2 | WEIGHT: 197 LBS | HEART RATE: 65 BPM | SYSTOLIC BLOOD PRESSURE: 128 MMHG | DIASTOLIC BLOOD PRESSURE: 90 MMHG

## 2024-05-01 DIAGNOSIS — E66.3 OVERWEIGHT (BMI 25.0-29.9): Primary | ICD-10-CM

## 2024-05-01 PROCEDURE — 99215 OFFICE O/P EST HI 40 MIN: CPT | Performed by: INTERNAL MEDICINE

## 2024-05-01 NOTE — PROGRESS NOTES
Program: VLCD vs Conservative    Assessment/Plan     Rayne Irizarry  is a 43 y.o. male with  returns to follow up  for treatment of excess body weight and associated risk factors/co-morbidities.     Overweight (BMI 25.0-29.9)  -Patient was initially seen for consultation on March 13, 2024.  At that time after presenting all the options, patient had decided to do VLCD.  Patient communicated via portal for reconsideration of medication options.      -I reviewed various medication options with him at length again.  Patient is currently in the overweight BMI with comorbidities of hyperlipidemia.  -Discussed supply concerns with some of the doses of Zepbound  -Discussed Wegovy and the possibility of the availability of this injectable medication becoming lower as result of the Zepbound shortage in the near future.  Did discuss with patient that this could contribute to cessation of therapy and consideration of further medication options such as oral  Per patient report, he was concerned about side effects such as nausea and vomiting and wondered if injecting on his thigh would prevent the side effects.  Did inform patient that nausea is a common side effect of the injectable GLP-1 medicine which does improve with continues use of the medication  -Discussed oral medication options such as Qsymia and its components phentermine and topiramate.  Did again review that phentermine was a stimulant medicine and a controlled substance which requires a baseline EKG and monitoring heart rate and blood pressure  -Did discuss Topamax as no contraindications and side effects of tingling numbness and brain fog which we will monitor the patient for  -Discussed Wellbutrin and naltrexone to target hedonic eating.  -Discussed metformin as a good therapy given that his fasting insulin was very elevated at 39.    Patient decided against doing medications and will proceed with VLCD.  I have reviewed his labs and EKG and patient may  proceed.       Most recent notes and records were reviewed.         Return visit:  6-8 weeks     Nutrition   Do not skip meals. Avoid sugary beverages. At least 64oz of water daily.    Behavioral/Stress   Food log via mat or provided paper log (mat options include www.[email or web address], sparkpeople.com, loseit.com, calorieking.com, iDevices). Encouraged mindful eating    Physical Activity  Increase physical activity by 10 minutes daily. Gradually increase physical activity to a goal of 5 days per week for 30 minutes of MODERATE intensity ( target 150-300 minutes  PLUS 2 days per week of FULL BODY resistance training. Progression will be addressed at follow up visits. Encouraged contemplation regarding establishing physical activity routine    Sleep  Provided sleep hygiene counseling and importance of having adequate sleep duration          Rayne was seen today for follow-up.    Diagnoses and all orders for this visit:    Overweight (BMI 25.0-29.9)                Total time spent reviewing chart, interviewing patient, examining patient, discussing plan, answering all questions, and documentin minutes with >50% face-to-face time with the patient.            Weight trajectory     Wt Readings from Last 20 Encounters:   24 89.4 kg (197 lb)   24 83.9 kg (185 lb)   24 89.2 kg (196 lb 9.6 oz)   24 87.9 kg (193 lb 12.8 oz)   23 85.7 kg (189 lb)   11/10/23 81.6 kg (180 lb)   23 86.3 kg (190 lb 3.2 oz)   10/05/23 84.8 kg (187 lb)   23 83.5 kg (184 lb)               Weight/ Lifestyle history     Diet recall:  B: skips coffee cream and sugar   L: soup or cafeteria coffee   D:cut down on rice but uses flat bread instead, lentils, cooked veggies  S: stopped snacking on cookies  Eating out vs cooking at home- once     Beverages  Water--  1.2 l x 2-3    Caffeine/tea--16 oz      SSB- none     Alcohol: rarely once in 2-3 months   Smoking: none  Drug use: none     Physical Activity --has  "been taking walks outside   Sleep -- STOP- BANG- 0/8; 7-8 hours     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  Psycho social- wife 14 and 12    Start date: 03/13/24  Intial weight : 193 lbs  Initial BMI: 29.04 kg/m2  Obesity Class: 25.0-29.9- Overweight  Goal weight: 150 lbs      Current Weight on 5/1/24 : 197 lbs  Current BMI : 29.52 kg/m2 25.0-29.9- Overweight  Difference: +3 lbs    Anti obesity Medications/ Medical---  Weight loss medication and dose: tried  saxenda when he was at [facility] in 2022  for 2 months oct- Dec nausea, throw up stopped it ,; started working at [employer] in Sep, patient felt weekly Ozempic better than saxenda didn't want to comiit to long term injectable medication so therefore stopped              Objective         The following portions of the patient's history were reviewed and updated as appropriate: allergies, current medications, past family history, past medical history, past social history, past surgical history, and problem list.      /90 (BP Location: Left arm, Patient Position: Sitting, Cuff Size: Large)   Pulse 65   Ht 5' 8.5\" (1.74 m)   Wt 89.4 kg (197 lb)   BMI 29.52 kg/m²         Review of Systems   Constitutional:  Negative for fatigue.   HENT:  Negative for sore throat.    Respiratory:  Negative for cough and shortness of breath.    Cardiovascular:  Negative for chest pain, palpitations and leg swelling.   Gastrointestinal:  Negative for abdominal pain, constipation, diarrhea and nausea.   Genitourinary:  Negative for dysuria.   Musculoskeletal:  Negative for arthralgias and back pain.   Skin:  Negative for rash.   Neurological:  Negative for headaches.   Psychiatric/Behavioral:  Negative for dysphoric mood. The patient is not nervous/anxious.        Physical Exam  Vitals and nursing note reviewed.   Constitutional:       Appearance: Normal appearance.   HENT:      Head: Normocephalic.   Pulmonary:      Effort: Pulmonary effort is " normal.   Neurological:      General: No focal deficit present.      Mental Status: He is alert and oriented to person, place, and time.   Psychiatric:         Mood and Affect: Mood normal.         Behavior: Behavior normal.         Thought Content: Thought content normal.         Judgment: Judgment normal.              Current medications       Current Outpatient Medications:     levothyroxine 75 mcg tablet, Take 1 tablet (75 mcg total) by mouth daily, Disp: 90 tablet, Rfl: 0    Multiple Vitamin (Multivitamin Adult) TABS, Take 1 tablet by mouth in the morning, Disp: , Rfl:     omeprazole (PriLOSEC) 40 MG capsule, Take 1 capsule (40 mg total) by mouth daily, Disp: 90 capsule, Rfl: 3         Labs     Most recent labs reviewed   Lab Results   Component Value Date    SODIUM 138 04/26/2024    K 4.0 04/26/2024     04/26/2024    CO2 27 04/26/2024    AGAP 7 04/26/2024    BUN 14 04/26/2024    CREATININE 1.04 04/26/2024    GLUF 71 04/26/2024    CALCIUM 9.6 04/26/2024    AST 20 04/26/2024    ALT 23 04/26/2024    ALKPHOS 68 04/26/2024    TP 7.5 04/26/2024    TBILI 0.54 04/26/2024    EGFR 86 04/26/2024     Lab Results   Component Value Date    HGBA1C 5.5 04/26/2024     Lab Results   Component Value Date    VFC8IIPEKOUA 1.084 11/16/2023     Lab Results   Component Value Date    CHOLESTEROL 217 (H) 04/26/2024     Lab Results   Component Value Date    HDL 37 (L) 04/26/2024     Lab Results   Component Value Date    TRIG 170 (H) 04/26/2024     Lab Results   Component Value Date    LDLCALC 146 (H) 04/26/2024

## 2024-05-01 NOTE — ASSESSMENT & PLAN NOTE
-Patient was initially seen for consultation on March 13, 2024.  At that time after presenting all the options, patient had decided to do VLCD.  Patient communicated via portal for reconsideration of medication options.      -I reviewed various medication options with him at length again.  Patient is currently in the overweight BMI with comorbidities of hyperlipidemia.  -Discussed supply concerns with some of the doses of Zepbound  -Discussed Wegovy and the possibility of the availability of this injectable medication becoming lower as result of the Zepbound shortage in the near future.  Did discuss with patient that this could contribute to cessation of therapy and consideration of further medication options such as oral  Per patient report, he was concerned about side effects such as nausea and vomiting and wondered if injecting on his thigh would prevent the side effects.  Did inform patient that nausea is a common side effect of the injectable GLP-1 medicine which does improve with continues use of the medication  -Discussed oral medication options such as Qsymia and its components phentermine and topiramate.  Did again review that phentermine was a stimulant medicine and a controlled substance which requires a baseline EKG and monitoring heart rate and blood pressure  -Did discuss Topamax as no contraindications and side effects of tingling numbness and brain fog which we will monitor the patient for  -Discussed Wellbutrin and naltrexone to target hedonic eating.  -Discussed metformin as a good therapy given that his fasting insulin was very elevated at 39.    Patient decided against doing medications and will proceed with VLCD.  I have reviewed his labs and EKG and patient may proceed.

## 2024-05-02 NOTE — PROGRESS NOTES
Initial RD session for VLCD completed. Components of diet discussed including ketosis, hydration, and possible side effects. Provided patient VLCD education packet and reviewed product use. 2 weeks of product ordered and received. Will f/u 5/8/24 via email to assess tolerance.

## 2024-05-03 ENCOUNTER — CLINICAL SUPPORT (OUTPATIENT)
Dept: BARIATRICS | Facility: CLINIC | Age: 44
End: 2024-05-03
Payer: COMMERCIAL

## 2024-05-03 VITALS — BODY MASS INDEX: 29.18 KG/M2 | WEIGHT: 197 LBS | HEIGHT: 69 IN

## 2024-05-03 DIAGNOSIS — R63.5 ABNORMAL WEIGHT GAIN: Primary | ICD-10-CM

## 2024-05-03 PROCEDURE — VLCD

## 2024-05-03 PROCEDURE — RECHECK

## 2024-05-08 ENCOUNTER — TELEPHONE (OUTPATIENT)
Dept: BARIATRICS | Facility: CLINIC | Age: 44
End: 2024-05-08

## 2024-05-08 NOTE — TELEPHONE ENCOUNTER
Contacted patient to follow-up on tolerance of VLCD Diet and to take payment from initial order. Patient consented to payment over the phone and was offered to complete the payment at home himself. Patient states he is doing well. Tolerating meal replacements without difficulty. Consuming at least 80 oz of water in addition to water used to mix replacements. Will follow up in 2 weeks.

## 2024-05-20 ENCOUNTER — PATIENT OUTREACH (OUTPATIENT)
Dept: BARIATRICS | Facility: CLINIC | Age: 44
End: 2024-05-20

## 2024-05-20 NOTE — PROGRESS NOTES
Patient placed product order for upcoming [facility] appointment. Emailed patient to inform him he will need to purchase 2 additional boxes of shakes or puddings to complete the nutrition prescription as he only purchases 4 boxes of soups and the prescription requires 6 boxes total. Informed patient that since he purchased fiber supplements, these 2 additional products do not need to contain fiber. Advised patient to place order prior to appointment or have his phone available during appointment as orders can not be processed on office computer.

## 2024-05-20 NOTE — PROGRESS NOTES
"Weight Management Medical Nutrition Assessment  Rayne is here for 2 week VLCD f/u. Current Weight: 189# Patient has lost 8# x 2 weeks (averaging 4#/week). Patient had emergency meal 1x in the last 2 weeks. Meal consisted of just salmon, no vegetables and the patient skipped 2 shakes. Reviewed emergency meal guidelines with patient. Reports no adverse effects. BP WNL. Hydration adequate. Labs provided and reviewed. Will continue VLCD at this time.    Patient seen by Medical Provider in past 6 months:  yes  Requested to schedule appointment with Medical Provider: no    Anthropometric Measurements  VLCD Start Weight (#): 197# (5/3/24)  Current Weight (#): 189#  TBW % Change from start weight: 4%  Ideal Body Weight (#): 157# (68.5\")  Goal Weight (#): 190#    Weight Loss History  Previous weight loss attempts: Exercise  Self Created Diets (Portion Control, Healthy Food Choices, etc.)    Food and Nutrition Related History  Wake up: 5:30 am    Bed Time: 10:30 pm   Sleep quality: well     Dietary Recall  Breakfast: Replacement   Snack: --  Lunch: Replacement  Snack: ND bar   Dinner: Replacement  Snack: --    Beverages: Water and black coffee (8 oz)   Volume of beverage intake: 80+ oz water    Weekends: Same  Cravings: --  Trouble area of day: --    Frequency of Eating out: n/a  Food restrictions: VLCD  Cooking: self and wife  Food Shopping: self and wife     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     Physical Activity  Activity: n/a  Frequency: n/a  Physical limitations/barriers to exercise: VLCD    Estimated Needs  Bjorn Dick Energy Needs (needs at 189#)  BMR: 1,725 kcal  Maintenance calories (sedentary): 2,070 kcal  1-2# loss weekly sedentary: 1,070-1,570 kcal  1-2# loss weekly lightly active: 1,371-1,871 kcal    Protein:  grams (1.2-1.5g/kg IBW)  Fluid: 83 ounces (35mL/kg IBW)    Nutrition Diagnosis  Yes;    Overweight/obesity related to Excess energy intake as evidenced by " BMI more than normative standard for age and sex (overweight 25-29.9)     Nutrition Intervention    Nutrition Prescription  Calories: 760 kcal  Protein: 95 g  Fluid: 80+ ounces    Meal Plan (Andrea/Pro)  VLCD with 3 replacements + 1 bar daily     Nutrition Education  VLCD Manual   Calorie controlled menu  Lean protein food choices  Healthy snack options  Food journaling tips    Nutrition Counseling  Strategies: meal planning, portion sizes, healthy snack choices, hydration, fiber intake, protein intake, exercise, food logging    Monitoring and Evaluation:    Evaluation criteria  Energy Intake  Meet protein needs  Maintain adequate hydration  Monitor weekly weight  Meal planning/preparation  Food journal   Decreased portions at mealtimes and snacks  Physical activity     Barriers to learning:none  Readiness to change: Action:  (Changing behavior)  Comprehension: very good  Expected Compliance: very good

## 2024-05-21 ENCOUNTER — CLINICAL SUPPORT (OUTPATIENT)
Dept: BARIATRICS | Facility: CLINIC | Age: 44
End: 2024-05-21

## 2024-05-21 VITALS
HEIGHT: 69 IN | DIASTOLIC BLOOD PRESSURE: 80 MMHG | BODY MASS INDEX: 27.99 KG/M2 | WEIGHT: 189 LBS | SYSTOLIC BLOOD PRESSURE: 124 MMHG

## 2024-05-21 DIAGNOSIS — Z71.3 KETOGENIC DIET MONITORING ENCOUNTER: ICD-10-CM

## 2024-05-21 DIAGNOSIS — R63.5 ABNORMAL WEIGHT GAIN: Primary | ICD-10-CM

## 2024-05-21 PROCEDURE — RECHECK

## 2024-05-30 NOTE — PROGRESS NOTES
"Weight Management Medical Nutrition Assessment  Rayne is here for 4 week VLCD f/u. Current Weight: 188.2# Patient has lost 0.8# x 2 weeks and 8.8# x 4 weeks (averaging 2.2#/week). Patient reports he became sick over the last week unrelated to VLCD. This resulted in him deviating from the nutrition prescription and exiting ketosis, having \"comfort meals\" such as rice with lentils and vegetables. Offered patient alternative meal planning options but he would like to return to full VLCD. Advised patient to stick with nutrition prescription or contact RD for alternative plan so he is not switching in and out of nutritional ketosis. Otherwise, no adverse effects noted. Hydration adequate. BP WNL. Will return to full VLCD at this time.    Patient seen by Medical Provider in past 6 months:  yes  Requested to schedule appointment with Medical Provider: no    Anthropometric Measurements  VLCD Start Weight (#): 197# (5/3/24)  Current Weight (#): 188.2#  TBW % Change from start weight: 4.5%  Ideal Body Weight (#): 157# (68.5\")  Goal Weight (#): 190#    Weight Loss History  Previous weight loss attempts: Exercise  Self Created Diets (Portion Control, Healthy Food Choices, etc.)    Food and Nutrition Related History  Wake up: 5:30 am    Bed Time: 10:30 pm   Sleep quality: well     Dietary Recall  Breakfast: Replacement   Snack: --  Lunch: Replacement  Snack: ND bar   Dinner: Replacement  Snack: --    Beverages: Water and black coffee (8 oz)   Volume of beverage intake: 80+ oz water    Weekends: Same  Cravings: --  Trouble area of day: --    Frequency of Eating out: n/a  Food restrictions: VLCD  Cooking: self and wife  Food Shopping: self and wife     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     Physical Activity  Activity: n/a  Frequency: n/a  Physical limitations/barriers to exercise: VLCD    Estimated Needs  Bjorn Dick Energy Needs (needs at 189#)  BMR: 1,725 kcal  Maintenance calories " (sedentary): 2,070 kcal  1-2# loss weekly sedentary: 1,070-1,570 kcal  1-2# loss weekly lightly active: 1,371-1,871 kcal    Protein:  grams (1.2-1.5g/kg IBW)  Fluid: 83 ounces (35mL/kg IBW)    Nutrition Diagnosis  Yes;    Overweight/obesity related to Excess energy intake as evidenced by BMI more than normative standard for age and sex (overweight 25-29.9)     Nutrition Intervention    Nutrition Prescription  Calories: 760 kcal  Protein: 95 g  Fluid: 80+ ounces    Meal Plan (Andrea/Pro)  VLCD with 3 replacements + 1 bar daily     Nutrition Education  VLCD Manual   Calorie controlled menu  Lean protein food choices  Healthy snack options  Food journaling tips    Nutrition Counseling  Strategies: meal planning, portion sizes, healthy snack choices, hydration, fiber intake, protein intake, exercise, food logging    Monitoring and Evaluation:    Evaluation criteria  Energy Intake  Meet protein needs  Maintain adequate hydration  Monitor weekly weight  Meal planning/preparation  Food journal   Decreased portions at mealtimes and snacks  Physical activity     Barriers to learning:none  Readiness to change: Action:  (Changing behavior)  Comprehension: very good  Expected Compliance: very good

## 2024-05-31 ENCOUNTER — APPOINTMENT (OUTPATIENT)
Dept: LAB | Facility: HOSPITAL | Age: 44
End: 2024-05-31
Payer: COMMERCIAL

## 2024-05-31 DIAGNOSIS — Z71.3 KETOGENIC DIET MONITORING ENCOUNTER: ICD-10-CM

## 2024-05-31 DIAGNOSIS — R63.5 ABNORMAL WEIGHT GAIN: ICD-10-CM

## 2024-05-31 DIAGNOSIS — E03.9 ACQUIRED HYPOTHYROIDISM: ICD-10-CM

## 2024-05-31 LAB
ALBUMIN SERPL BCP-MCNC: 4.2 G/DL (ref 3.5–5)
ALP SERPL-CCNC: 75 U/L (ref 34–104)
ALT SERPL W P-5'-P-CCNC: 25 U/L (ref 7–52)
ANION GAP SERPL CALCULATED.3IONS-SCNC: 8 MMOL/L (ref 4–13)
AST SERPL W P-5'-P-CCNC: 21 U/L (ref 13–39)
BASOPHILS # BLD AUTO: 0.03 THOUSANDS/ÂΜL (ref 0–0.1)
BASOPHILS NFR BLD AUTO: 1 % (ref 0–1)
BILIRUB SERPL-MCNC: 0.47 MG/DL (ref 0.2–1)
BUN SERPL-MCNC: 9 MG/DL (ref 5–25)
CALCIUM SERPL-MCNC: 9 MG/DL (ref 8.4–10.2)
CHLORIDE SERPL-SCNC: 106 MMOL/L (ref 96–108)
CO2 SERPL-SCNC: 24 MMOL/L (ref 21–32)
CREAT SERPL-MCNC: 0.98 MG/DL (ref 0.6–1.3)
EOSINOPHIL # BLD AUTO: 0.38 THOUSAND/ÂΜL (ref 0–0.61)
EOSINOPHIL NFR BLD AUTO: 7 % (ref 0–6)
ERYTHROCYTE [DISTWIDTH] IN BLOOD BY AUTOMATED COUNT: 13.4 % (ref 11.6–15.1)
GFR SERPL CREATININE-BSD FRML MDRD: 92 ML/MIN/1.73SQ M
GLUCOSE SERPL-MCNC: 80 MG/DL (ref 65–140)
HCT VFR BLD AUTO: 51.5 % (ref 36.5–49.3)
HGB BLD-MCNC: 16.8 G/DL (ref 12–17)
IMM GRANULOCYTES # BLD AUTO: 0.01 THOUSAND/UL (ref 0–0.2)
IMM GRANULOCYTES NFR BLD AUTO: 0 % (ref 0–2)
LYMPHOCYTES # BLD AUTO: 1.9 THOUSANDS/ÂΜL (ref 0.6–4.47)
LYMPHOCYTES NFR BLD AUTO: 34 % (ref 14–44)
MAGNESIUM SERPL-MCNC: 2.3 MG/DL (ref 1.9–2.7)
MCH RBC QN AUTO: 28.6 PG (ref 26.8–34.3)
MCHC RBC AUTO-ENTMCNC: 32.6 G/DL (ref 31.4–37.4)
MCV RBC AUTO: 88 FL (ref 82–98)
MONOCYTES # BLD AUTO: 0.42 THOUSAND/ÂΜL (ref 0.17–1.22)
MONOCYTES NFR BLD AUTO: 8 % (ref 4–12)
NEUTROPHILS # BLD AUTO: 2.78 THOUSANDS/ÂΜL (ref 1.85–7.62)
NEUTS SEG NFR BLD AUTO: 50 % (ref 43–75)
NRBC BLD AUTO-RTO: 0 /100 WBCS
PLATELET # BLD AUTO: 248 THOUSANDS/UL (ref 149–390)
PMV BLD AUTO: 10.7 FL (ref 8.9–12.7)
POTASSIUM SERPL-SCNC: 4 MMOL/L (ref 3.5–5.3)
PROT SERPL-MCNC: 7.5 G/DL (ref 6.4–8.4)
RBC # BLD AUTO: 5.87 MILLION/UL (ref 3.88–5.62)
SODIUM SERPL-SCNC: 138 MMOL/L (ref 135–147)
TSH SERPL DL<=0.05 MIU/L-ACNC: 0.69 UIU/ML (ref 0.45–4.5)
WBC # BLD AUTO: 5.52 THOUSAND/UL (ref 4.31–10.16)

## 2024-05-31 PROCEDURE — 84443 ASSAY THYROID STIM HORMONE: CPT

## 2024-05-31 PROCEDURE — 83735 ASSAY OF MAGNESIUM: CPT

## 2024-05-31 PROCEDURE — 85025 COMPLETE CBC W/AUTO DIFF WBC: CPT

## 2024-05-31 PROCEDURE — 36415 COLL VENOUS BLD VENIPUNCTURE: CPT

## 2024-05-31 PROCEDURE — 80053 COMPREHEN METABOLIC PANEL: CPT

## 2024-06-03 ENCOUNTER — CLINICAL SUPPORT (OUTPATIENT)
Dept: BARIATRICS | Facility: CLINIC | Age: 44
End: 2024-06-03

## 2024-06-03 ENCOUNTER — TELEPHONE (OUTPATIENT)
Dept: FAMILY MEDICINE CLINIC | Facility: CLINIC | Age: 44
End: 2024-06-03

## 2024-06-03 VITALS
BODY MASS INDEX: 27.88 KG/M2 | HEIGHT: 69 IN | SYSTOLIC BLOOD PRESSURE: 112 MMHG | DIASTOLIC BLOOD PRESSURE: 78 MMHG | WEIGHT: 188.2 LBS

## 2024-06-03 DIAGNOSIS — E66.3 OVERWEIGHT WITH BODY MASS INDEX (BMI) OF 28 TO 28.9 IN ADULT: Primary | ICD-10-CM

## 2024-06-03 PROCEDURE — S9470 NUTRITIONAL COUNSELING, DIET: HCPCS

## 2024-06-03 PROCEDURE — RECHECK

## 2024-06-17 ENCOUNTER — OFFICE VISIT (OUTPATIENT)
Dept: BARIATRICS | Facility: CLINIC | Age: 44
End: 2024-06-17
Payer: COMMERCIAL

## 2024-06-17 VITALS
HEIGHT: 69 IN | BODY MASS INDEX: 27.85 KG/M2 | HEART RATE: 60 BPM | DIASTOLIC BLOOD PRESSURE: 88 MMHG | WEIGHT: 188 LBS | SYSTOLIC BLOOD PRESSURE: 120 MMHG

## 2024-06-17 DIAGNOSIS — E66.3 OVERWEIGHT (BMI 25.0-29.9): ICD-10-CM

## 2024-06-17 PROCEDURE — 99215 OFFICE O/P EST HI 40 MIN: CPT | Performed by: INTERNAL MEDICINE

## 2024-06-17 NOTE — ASSESSMENT & PLAN NOTE
Overweight BMI of 28 with hyperlipidemia  -Currently on VLCD patient had to go off plan for 2 to 3 days in the last 6 weeks when he felt sick.  Patient is returning to full VLCD now and would likely want to complete the program.  Upon completion of it and going over to a transition plan would consider medications.  Currently tolerating VLCD without any hunger energy or bowel problems    Medication options for the overweight BMI of and hyperlipidemia include injectable medicine such as Wegovy Zepbound  -BP improved today 120/88, so phentermine Qsymia Topamax could be considered  -Wellbutrin naltrexone or Contrave could be considered for hedonic eating  -Did discuss with patient previously that his fasting insulin was elevated at 39 metformin would be a good option  -Will reorder BMP magnesium for his follow-up visit

## 2024-06-17 NOTE — PROGRESS NOTES
"Assessment/Plan:    Overweight (BMI 25.0-29.9)  Overweight BMI of 28 with hyperlipidemia  -Currently on VLCD patient had to go off plan for 2 to 3 days in the last 6 weeks when he felt sick.  Patient is returning to full VLCD now and would likely want to complete the program.  Upon completion of it and going over to a transition plan would consider medications.  Currently tolerating VLCD without any hunger energy or bowel problems    Medication options for the overweight BMI of and hyperlipidemia include injectable medicine such as Wegovy Zepbound  -BP improved today 120/88, so phentermine Qsymia Topamax could be considered  -Wellbutrin naltrexone or Contrave could be considered for hedonic eating  -Did discuss with patient previously that his fasting insulin was elevated at 39 metformin would be a good option  -Currently minimizing physical activity on VLCD on reports no problem with sleep  -He is down 8 pounds in 6 weeks    Rayne was seen today for follow-up.    Diagnoses and all orders for this visit:    Overweight (BMI 25.0-29.9)        Follow up in approximately 2 months with Non-Surgical Physician/Advanced Practitioner.    Subjective:   Chief Complaint   Patient presents with    Follow-up     Pt is here for MWM f/u. Waist - 41.2\"        Patient ID: Rayne Irizarry  is a 43 y.o. male with excess weight/obesity here to pursue weight managment.  Patient is pursuing Very Low Calorie Diet-VLCD.     HPI    Wt Readings from Last 20 Encounters:   06/17/24 85.3 kg (188 lb)   06/03/24 85.4 kg (188 lb 3.2 oz)   05/21/24 85.7 kg (189 lb)   05/03/24 89.4 kg (197 lb)   05/01/24 89.4 kg (197 lb)   04/19/24 83.9 kg (185 lb)   04/01/24 89.2 kg (196 lb 9.6 oz)   03/13/24 87.9 kg (193 lb 12.8 oz)   12/04/23 85.7 kg (189 lb)   11/10/23 81.6 kg (180 lb)   11/01/23 86.3 kg (190 lb 3.2 oz)   10/05/23 84.8 kg (187 lb)   09/26/23 83.5 kg (184 lb)       Patient presents today to medical weight management office for follow up of the VLCD " "diet.  Patient expressed frustration that he is only lost about 9 pounds in 6 weeks.  Patient met with RD who instructed him to stick with nutrition prescription so is not in and out of nutritional ketosis    Weeks into the program: 6 weeks   Started date and weight: 5/3/2024 197 pounds  Last OV weight on 5/1/2024: 197  Current weight: 188  Difference: -9 lbs    Compliance to program -had to go off plan for 2 to 3 days and therefore of ketosis when he felt sick and ate rice and lentils  Concerns with bowels - normal  Other food besides the products - had to go off plan and therefore of ketosis when he feels sick and ate rice and lentils and he is back on track  Emergency meals -none other than above  Hydration -80 oz-   Hunger and Energy Level -no hunger or cravings  Medication Adjustments:  BP -120/88  Blood sugar -80  Continue -yes patient would like to continue for another month    If 6 week follow up order bmp and magnesium           Review of Systems   Constitutional:  Negative for fatigue.   HENT:  Negative for sore throat.    Respiratory:  Negative for cough and shortness of breath.    Cardiovascular:  Negative for chest pain, palpitations and leg swelling.   Gastrointestinal:  Negative for abdominal pain, constipation, diarrhea and nausea.   Genitourinary:  Negative for dysuria.   Musculoskeletal:  Negative for arthralgias and back pain.   Skin:  Negative for rash.   Neurological:  Negative for headaches.   Psychiatric/Behavioral:  Negative for dysphoric mood. The patient is not nervous/anxious.        Objective:    /88 (BP Location: Left arm, Patient Position: Sitting, Cuff Size: Large)   Pulse 60   Ht 5' 8.5\" (1.74 m)   Wt 85.3 kg (188 lb)   BMI 28.17 kg/m²      Physical Exam  Vitals and nursing note reviewed.   Constitutional:       Appearance: Normal appearance.   HENT:      Head: Normocephalic.   Neck:      Thyroid: No thyroid mass, thyromegaly or thyroid tenderness.   Cardiovascular:      " Rate and Rhythm: Normal rate and regular rhythm.      Pulses: Normal pulses.      Heart sounds: Normal heart sounds.   Pulmonary:      Effort: Pulmonary effort is normal.      Breath sounds: Normal breath sounds.   Abdominal:      General: Abdomen is flat.      Palpations: Abdomen is soft.   Musculoskeletal:      Cervical back: Normal range of motion and neck supple. No tenderness.   Skin:     General: Skin is warm and dry.   Neurological:      General: No focal deficit present.      Mental Status: He is alert and oriented to person, place, and time.   Psychiatric:         Mood and Affect: Mood normal.         Behavior: Behavior normal.         Thought Content: Thought content normal.         Judgment: Judgment normal.         Labs   Most recent labs reviewed   Lab Results   Component Value Date    SODIUM 138 05/31/2024    K 4.0 05/31/2024     05/31/2024    CO2 24 05/31/2024    AGAP 8 05/31/2024    BUN 9 05/31/2024    CREATININE 0.98 05/31/2024    GLUC 80 05/31/2024    GLUF 71 04/26/2024    CALCIUM 9.0 05/31/2024    AST 21 05/31/2024    ALT 25 05/31/2024    ALKPHOS 75 05/31/2024    TP 7.5 05/31/2024    TBILI 0.47 05/31/2024    EGFR 92 05/31/2024     Lab Results   Component Value Date    HGBA1C 5.5 04/26/2024     Lab Results   Component Value Date    COU2HXRVOQDD 0.693 05/31/2024     Lab Results   Component Value Date    CHOLESTEROL 217 (H) 04/26/2024     Lab Results   Component Value Date    HDL 37 (L) 04/26/2024     Lab Results   Component Value Date    TRIG 170 (H) 04/26/2024     Lab Results   Component Value Date    LDLCALC 146 (H) 04/26/2024

## 2024-08-04 DIAGNOSIS — E03.9 ACQUIRED HYPOTHYROIDISM: ICD-10-CM

## 2024-08-04 RX ORDER — LEVOTHYROXINE SODIUM 0.07 MG/1
75 TABLET ORAL DAILY
Qty: 90 TABLET | Refills: 1 | Status: SHIPPED | OUTPATIENT
Start: 2024-08-04

## 2024-08-14 DIAGNOSIS — M25.512 BILATERAL SHOULDER PAIN, UNSPECIFIED CHRONICITY: Primary | ICD-10-CM

## 2024-08-14 DIAGNOSIS — M25.511 BILATERAL SHOULDER PAIN, UNSPECIFIED CHRONICITY: Primary | ICD-10-CM

## 2024-08-20 ENCOUNTER — APPOINTMENT (OUTPATIENT)
Dept: RADIOLOGY | Facility: CLINIC | Age: 44
End: 2024-08-20
Payer: COMMERCIAL

## 2024-08-20 ENCOUNTER — OFFICE VISIT (OUTPATIENT)
Dept: OBGYN CLINIC | Facility: CLINIC | Age: 44
End: 2024-08-20
Payer: COMMERCIAL

## 2024-08-20 VITALS
DIASTOLIC BLOOD PRESSURE: 82 MMHG | HEIGHT: 69 IN | SYSTOLIC BLOOD PRESSURE: 120 MMHG | BODY MASS INDEX: 27.99 KG/M2 | WEIGHT: 189 LBS

## 2024-08-20 DIAGNOSIS — M25.512 BILATERAL SHOULDER PAIN, UNSPECIFIED CHRONICITY: Primary | ICD-10-CM

## 2024-08-20 DIAGNOSIS — M25.512 BILATERAL SHOULDER PAIN, UNSPECIFIED CHRONICITY: ICD-10-CM

## 2024-08-20 DIAGNOSIS — M25.511 BILATERAL SHOULDER PAIN, UNSPECIFIED CHRONICITY: ICD-10-CM

## 2024-08-20 DIAGNOSIS — M25.511 BILATERAL SHOULDER PAIN, UNSPECIFIED CHRONICITY: Primary | ICD-10-CM

## 2024-08-20 PROCEDURE — 73030 X-RAY EXAM OF SHOULDER: CPT

## 2024-08-20 PROCEDURE — 99204 OFFICE O/P NEW MOD 45 MIN: CPT | Performed by: STUDENT IN AN ORGANIZED HEALTH CARE EDUCATION/TRAINING PROGRAM

## 2024-08-20 NOTE — PROGRESS NOTES
Ortho Sports Medicine Shoulder New Patient Visit     Assesment:   44 y.o. male bilateral shoulder pain suspicious for bilateral rotator cuff tendinitis, left biceps tendonitis    Plan:    Rayne is present in office for evaluations of bilateral shoulder pain. At this time x-ray's were performed in office and reviewed with the patient, discussed that imaging shows no acute osseous abnormality. At this time discussed that his range of motion and strength are good and that suspicious of bilateral rotator cuff tendinitis. At this time discussed conservative treatment, discussed and recommended physical therapy at this time as this often resolves itself, discussed possibility of corticosteroid injection, however, discussed that due to not having significant pain and consistent pain would hold off for now, patient in agreement and physical therapy referral was placed. At this time discussed that he can follow up in 6 weeks time, if by that time he improves he can cancel his appointment. If his symptoms do not improve or if they worsen is to return and we can discuss possible left shoulder MRI.    Conservative treatment:    Ice to shoulder 1-2 times daily, for 20 minutes at a time.  PT for ROM and strengthening to shoulder, rotator cuff, scapular stabilizers.  Home exercise program for shoulder, including ROM and strenthening.  Instructions given to patient of what exercises to perform.  Let pain guide return to activities.      Imaging:    All imaging from today was reviewed by myself and explained to the patient.       Injection:    No Injection planned at this time.      Surgery:     No surgery is recommended at this point, continue with conservative treatment plan as noted.      Follow up:    Return in about 6 weeks (around 10/1/2024).        Chief Complaint   Patient presents with    Left Shoulder - Pain    Right Shoulder - Pain       History of Present Illness:    The patient is a 44 y.o., right hand dominant male  "whose occupation is in [employer] ,seen in clinic for evaluation of bilateral shoulder pain. Patient reports no recent injury or trauma to the shoulders. States that 8-9 years ago he bumped into window lever on lateral left shoulder and has been having shoulder pain since. States that his shoulder pain is anterior and later, states that he does not have any joint pain. Reports that the left shoulder is worse than the right. States that he is working from home more often and is wondering if it is from his \"ergonomics\". He denies any numbness or tingling. He states that he has not tried any physical therapy and has not had any injections pain is aggravated by forward flexion and abduction exercises like driving, lifting weights at the gym and doing rotational movements with bilateral arms. States that his pain is 4-5/10. States that he does not have any pain at rest.                Shoulder Surgical History:  None    Past Medical, Social and Family History:  Past Medical History:   Diagnosis Date    Disease of thyroid gland      Past Surgical History:   Procedure Laterality Date    LIPOMA RESECTION Bilateral 2013     Allergies   Allergen Reactions    Pollen Extract Allergic Rhinitis and Shortness Of Breath     Other reaction(s): Sneezing  Other reaction(s): Sneezing       Current Outpatient Medications on File Prior to Visit   Medication Sig Dispense Refill    levothyroxine 75 mcg tablet Take 1 tablet (75 mcg total) by mouth daily 90 tablet 1    Multiple Vitamin (Multivitamin Adult) TABS Take 1 tablet by mouth in the morning      omeprazole (PriLOSEC) 40 MG capsule Take 1 capsule (40 mg total) by mouth daily 90 capsule 3     No current facility-administered medications on file prior to visit.     Social History     Socioeconomic History    Marital status: /Civil Union     Spouse name: Not on file    Number of children: Not on file    Years of education: Not on file    Highest education level: Not on file " "  Occupational History    Not on file   Tobacco Use    Smoking status: Never     Passive exposure: Never    Smokeless tobacco: Never   Vaping Use    Vaping status: Never Used   Substance and Sexual Activity    Alcohol use: Not Currently     Alcohol/week: 2.0 standard drinks of alcohol     Comment: 2 drinks/month    Drug use: Never    Sexual activity: Yes     Partners: Female     Birth control/protection: Condom Male   Other Topics Concern    Not on file   Social History Narrative    Not on file     Social Determinants of Health     Financial Resource Strain: Not on file   Food Insecurity: Not on file   Transportation Needs: Not on file   Physical Activity: Not on file   Stress: Not on file   Social Connections: Not on file   Intimate Partner Violence: Not on file   Housing Stability: Not on file         I have reviewed the past medical, surgical, social and family history, medications and allergies as documented in the EMR.    Review of systems: ROS is negative other than that noted in the HPI.  Constitutional: Negative for fatigue and fever.   HENT: Negative for sore throat.    Respiratory: Negative for shortness of breath.    Cardiovascular: Negative for chest pain.   Gastrointestinal: Negative for abdominal pain.   Endocrine: Negative for cold intolerance and heat intolerance.   Genitourinary: Negative for flank pain.   Musculoskeletal: Negative for back pain.   Skin: Negative for rash.   Allergic/Immunologic: Negative for immunocompromised state.   Neurological: Negative for dizziness.   Psychiatric/Behavioral: Negative for agitation.      Physical Exam:    Blood pressure 120/82, height 5' 8.5\" (1.74 m), weight 85.7 kg (189 lb).    General/Constitutional: NAD, well developed, well nourished  HENT: Normocephalic, atraumatic  CV: Intact distal pulses, regular rate  Resp: No respiratory distress or labored breathing  Lymphatic: No lymphadenopathy palpated  Neuro: Alert and Oriented x 3, no focal deficits  Psych: " Normal mood, normal affect, normal judgement, normal behavior  Skin: Warm, dry, no rashes, no erythema      Shoulder focused exam:     Visual inspection of the shoulder demonstrates normal contour without atrophy  No evidence of scapular dyskinesia or atrophy.  No scapular winging  Active and passive range of motion demonstrates forward flexion to 180, abduction to 90, , external rotation is 60 with the arm the side, internal rotation to thoracic   Rotator cuff strength is 5/5 to resisted forward flexion, 5/5 resisted abduction, 5/5 resisted external rotation, 5/5 resisted internal rotation  No tenderness to palpation at the distal clavicle, AC joint, acromion, or scapular spine  No pain with cross-body adduction  + Neer's test, + modified Torres impingement test  Negative external rotation lag sign  Negative belly press, negative lift-off  + speed's and Yergason's test  + tenderness to palpation at the bicipital groove  + McKinley's test        UE NV Exam: +2 Radial pulses bilaterally  Sensation intact to light touch C5-T1 bilaterally, Radial/median/ulnar nerve motor intact      Bilateral elbow, wrist, and and forearm ROM full, painless with passive ROM, no ttp or crepitance throughout extremities below shoulder joint    Cervical ROM is full without pain, numbness or tingling      Shoulder Imaging    X-rays of the bilateral shoulder were reviewed, which demonstrate no acute osseous abnormalities and adequate  glenohumeral joint space.  I do not currently have a radiology reading from [facility], but will check the result once the reading is performed.          Scribe Attestation      I,:  Prabha Su PA-C am acting as a scribe while in the presence of the attending physician.:       I,:  Rene Aponte DO personally performed the services described in this documentation    as scribed in my presence.:

## 2024-08-23 ENCOUNTER — EVALUATION (OUTPATIENT)
Dept: PHYSICAL THERAPY | Facility: CLINIC | Age: 44
End: 2024-08-23
Payer: COMMERCIAL

## 2024-08-23 DIAGNOSIS — M25.511 BILATERAL SHOULDER PAIN, UNSPECIFIED CHRONICITY: ICD-10-CM

## 2024-08-23 DIAGNOSIS — M25.512 BILATERAL SHOULDER PAIN, UNSPECIFIED CHRONICITY: ICD-10-CM

## 2024-08-23 PROCEDURE — 97110 THERAPEUTIC EXERCISES: CPT

## 2024-08-23 PROCEDURE — 97161 PT EVAL LOW COMPLEX 20 MIN: CPT

## 2024-08-23 NOTE — PROGRESS NOTES
PT Evaluation     Today's date: 2024  Patient name: Rayne Irizarry  : 1980  MRN: [id number]  Referring provider: Prabha Su PA-C  Dx:   Encounter Diagnosis     ICD-10-CM    1. Bilateral shoulder pain, unspecified chronicity  M25.511 Ambulatory Referral to Physical Therapy    M25.512                      Assessment  Impairments: abnormal or restricted ROM, activity intolerance, impaired physical strength, lacks appropriate home exercise program and pain with function    Assessment details: Rayne Irizarry is a pleasant 44 y.o. male who presents with chronic bilateral shoulder pain of insidious onset. He presents with normal and painfree R shoulder ROM, mildly limited R shoulder and scapular strength. He has mildly limited L shoulder abduction ROM with pain and full L shoulder flexion ROM with pain. He has mildly decreased L shoulder strength. Note minimally limited thoracic extension ROM, normal cervical ROM, and (-) median nerve tension bilaterally. These impairments are limiting him with lifting weight overhead for ADLs and for exercise at the gym as well as sitting for work at a desk/computer. He will benefit from skilled PT to address impairments and return to PLOF        Prognosis details: Positive prognostic indicators include positive attitude toward recovery, motivated to improve, high self-efficacy, good understanding of condition, realistic expectations.  Negative prognostic indicators include chronicity of symptoms, high symptom irritability,    Goals  STG to be achieved in 4 weeks  Patient will have improved L shoulder ROM to WNLs without pain  Patient will have improved gross BUE strength to 5/5  Improved thoracic extension ROM to WNLs  Patient will be independent with HEP.    LTG to be achieved in 8 weeks  Patient will be able to resume upper body lifting overhead when going to the gym  Patient will be able to sit for work with minimal reproduction of symptoms  Patient will be able to drive with  minimal to no reproduction of symptoms      Plan  Patient would benefit from: skilled physical therapy  Planned modality interventions: cryotherapy and thermotherapy: hydrocollator packs    Planned therapy interventions: balance, home exercise program, gait training, functional ROM exercises, body mechanics training, joint mobilization, manual therapy, neuromuscular re-education, therapeutic exercise, therapeutic activities, stretching, strengthening, flexibility and patient/caregiver education    Plan of Care beginning date: 2024  Plan of Care expiration date: 10/18/2024  Treatment plan discussed with: patient        Subjective Evaluation    History of Present Illness  Mechanism of injury: Patient has had bilateral shoulder pain    Patient reports chronic bilateral shoulder pain of insidious onset. Notes L shoulder pain may have been brought on after bumping into a window/window sill a few years ago. 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     Patient Goals  Patient goal: Patient would like to improve posture and get back into working out.  Pain  Current pain ratin  At best pain ratin  At worst pain ratin          Objective     Active Range of Motion   Cervical/Thoracic Spine       Cervical    Flexion: 50 degrees   Extension: 47 degrees      Left rotation: 66 degrees  Right rotation: 70 degrees       Thoracic    Right lateral flexion:  Restriction level: minimal  Left rotation:  Restriction level: minimal  Right rotation:  Restriction level: minimal  Left Shoulder   Flexion: WFL and with pain  Abduction: 170 degrees with pain  External rotation 90°: WFL and with pain    Right Shoulder   Flexion: WFL  Abduction: WFL  External rotation 90°:  WFL    Strength/Myotome Testing     Left Shoulder     Planes of Motion   Flexion: 4   Extension: 4+   Abduction: 3+ (pain)   External rotation at 0°: 4+ (pain)   Internal rotation at 0°: 5   Horizontal abduction: 3+ (pain)     Isolated Muscles   Lower trapezius: 3+ (pain)     Right Shoulder     Planes of Motion   Flexion: 4   Extension: 5   Abduction: 4   External rotation at 0°: 5   Internal rotation at 0°: 5   Horizontal abduction: 4     Isolated Muscles   Lower trapezius: 4     Tests     Left Shoulder   Negative ULTT1.              Precautions: none  Functional Limitations: reaching and lifting overhead, shoulder exercises at the gym, sitting to drive and work on the computer  Impairments: thoracic mobility, bilateral scapular strength, bilateral shoulder strength  MedBridge Code: [email or web address]   POC expiration: 10/18/2024      Manuals 8/23            L shoulder PROM nv            Prone thoracic PA mobs grade 3/4?                                       Neuro Re-Ed             TB row BTB 2x10 HEP            TB shld ext BTB 2x10 HEP            TB ER nv                         KB OHP                                       Ther Ex             UBE nv            Open books Nv?            Seated thoracic ext in chair 10 HEP            Bent over row nv            Bent over shld ext nv                                      CC lat pulldown             CC row nv                                                   Ther Activity                                       Gait Training                                       Modalities

## 2024-08-27 ENCOUNTER — OFFICE VISIT (OUTPATIENT)
Dept: PHYSICAL THERAPY | Facility: CLINIC | Age: 44
End: 2024-08-27
Payer: COMMERCIAL

## 2024-08-27 DIAGNOSIS — M25.512 BILATERAL SHOULDER PAIN, UNSPECIFIED CHRONICITY: Primary | ICD-10-CM

## 2024-08-27 DIAGNOSIS — M25.511 BILATERAL SHOULDER PAIN, UNSPECIFIED CHRONICITY: Primary | ICD-10-CM

## 2024-08-27 PROCEDURE — 97110 THERAPEUTIC EXERCISES: CPT

## 2024-08-27 PROCEDURE — 97140 MANUAL THERAPY 1/> REGIONS: CPT

## 2024-08-27 NOTE — PROGRESS NOTES
Daily Note     Today's date: 2024  Patient name: Rayne Irizarry  : 1980  MRN: [id number]  Referring provider: Prabha Su PA-C  Dx:   Encounter Diagnosis     ICD-10-CM    1. Bilateral shoulder pain, unspecified chronicity  M25.511     M25.512                      Subjective: Pt reports shoulder is feeling slightly better, HEP is going well so far.       Objective: See treatment diary below      Assessment: Initiated PT POC. Tolerated treatment well. PROM moving fairly well, only slight discomfort OH and ER but minimal and reduces with manual therapy. Pt has tendency to flair elbows when performing exercises, possible serratus weakness but able to self correct with VC's. Patient demonstrated fatigue post treatment, exhibited good technique with therapeutic exercises, and would benefit from continued PT      Plan: Continue per plan of care.  Progress treatment as tolerated.       Precautions: none  Functional Limitations: reaching and lifting overhead, shoulder exercises at the gym, sitting to drive and work on the computer  Impairments: thoracic mobility, bilateral scapular strength, bilateral shoulder strength  MedBridge Code: [email or web address]   POC expiration: 10/18/2024      Manuals            L shoulder PROM nv 20'           Prone thoracic PA mobs grade 3/4?                                       Neuro Re-Ed             TB row BTB 2x10 HEP            TB shld ext BTB 2x10 HEP            TB ER nv                         KB OHP  5# KB  X10 ea                                     Ther Ex             UBE nv 2'/2'           Open books Nv? X15 ea           Seated thoracic ext in chair 10 HEP x10           Bent over row nv 3#  X10 ea           Bent over shld ext nv 3#  X10 ea                                     CC lat pulldown  77#  10x2           CC row nv 77#  10x2                                                  Ther Activity                                       Gait Training                                        Modalities

## 2024-09-04 ENCOUNTER — OFFICE VISIT (OUTPATIENT)
Dept: PHYSICAL THERAPY | Facility: CLINIC | Age: 44
End: 2024-09-04
Payer: COMMERCIAL

## 2024-09-04 DIAGNOSIS — M25.511 BILATERAL SHOULDER PAIN, UNSPECIFIED CHRONICITY: Primary | ICD-10-CM

## 2024-09-04 DIAGNOSIS — M25.512 BILATERAL SHOULDER PAIN, UNSPECIFIED CHRONICITY: Primary | ICD-10-CM

## 2024-09-04 PROCEDURE — 97140 MANUAL THERAPY 1/> REGIONS: CPT

## 2024-09-04 PROCEDURE — 97110 THERAPEUTIC EXERCISES: CPT

## 2024-09-04 PROCEDURE — 97112 NEUROMUSCULAR REEDUCATION: CPT

## 2024-09-04 NOTE — PROGRESS NOTES
Daily Note     Today's date: 2024  Patient name: Rayne Irizarry  : 1980  MRN: [id number]  Referring provider: Prabha Su PA-C  Dx:   Encounter Diagnosis     ICD-10-CM    1. Bilateral shoulder pain, unspecified chronicity  M25.511     M25.512                      Subjective: Pt reports L shoulder is feeling worse than the R. He had no adverse reaction to last visit      Objective: See treatment diary below      Assessment: Patient tolerated treatment well overall. Note good shoulder PROM that is progressing well. Observed L scapular winging during serratus wall slides with the foam roll. Added additional serratus anterior strengthening to TE's and HEP. Will benefit from skilled PT to address impairments and return to PLOF      Plan: Continue per plan of care.  Progress treatment as tolerated.       Precautions: none  Functional Limitations: reaching and lifting overhead, shoulder exercises at the gym, sitting to drive and work on the computer  Impairments: thoracic mobility, bilateral scapular strength, bilateral shoulder strength  WebTeb Code: [email or web address]   POC expiration: 10/18/2024      Manuals  9/4          L shoulder PROM nv 20' 18'          Prone thoracic PA mobs grade 3/4?                                       Neuro Re-Ed             TB row BTB 2x10 HEP            TB shld ext BTB 2x10 HEP            TB ER nv                         KB OHP  5# KB  X10 ea 5#KB 2x10 ea          Serratus wall slide w/ foam roll   2x10          TB serratus flexion   OTB 2x10          Ther Ex             UBE nv 2'/2' 2'/2'          Open books Nv? X15 ea 15ea          Seated thoracic ext in chair 10 HEP x10 15x          Bent over row nv 3#  X10 ea 5# 2x10 ea          Bent over shld ext nv 3#  X10 ea 5# 2x10 ea          Supine serratus punch   3#  2x10                       CC lat pulldown  77#  10x2 77# 2x10          CC row nv 77#  10x2 77# 2x10                                                 Ther Activity                                        Gait Training                                       Modalities

## 2024-09-13 ENCOUNTER — APPOINTMENT (OUTPATIENT)
Dept: PHYSICAL THERAPY | Facility: CLINIC | Age: 44
End: 2024-09-13
Payer: COMMERCIAL

## 2024-09-27 ENCOUNTER — OFFICE VISIT (OUTPATIENT)
Dept: PHYSICAL THERAPY | Facility: CLINIC | Age: 44
End: 2024-09-27
Payer: COMMERCIAL

## 2024-09-27 DIAGNOSIS — M25.511 BILATERAL SHOULDER PAIN, UNSPECIFIED CHRONICITY: Primary | ICD-10-CM

## 2024-09-27 DIAGNOSIS — M25.512 BILATERAL SHOULDER PAIN, UNSPECIFIED CHRONICITY: Primary | ICD-10-CM

## 2024-09-27 PROCEDURE — 97112 NEUROMUSCULAR REEDUCATION: CPT

## 2024-09-27 PROCEDURE — 97140 MANUAL THERAPY 1/> REGIONS: CPT

## 2024-09-27 PROCEDURE — 97110 THERAPEUTIC EXERCISES: CPT

## 2024-09-27 NOTE — PROGRESS NOTES
Daily Note     Today's date: 2024  Patient name: Rayne Irizarry  : 1980  MRN: [id number]  Referring provider: Prabha Su PA-C  Dx:   Encounter Diagnosis     ICD-10-CM    1. Bilateral shoulder pain, unspecified chronicity  M25.511     M25.512                        Subjective: Pt reports shoulders feel a little better but still has bad days. L shoulder is still worse than right he states and driving makes it worse.       Objective: See treatment diary below      Assessment: Patient tolerated treatment well . L shoulder PROM is overall progressing well, still mild limitations particularly in ER. Focused on serratus anterior strengthening today to address L scapular winging. Additionally added in  scaption today with walking bilaterally but patient fatigued significantly during L shoulder laps. Patient is set to see Dr. Aponte for f/u on 10/3. The patient will continue to benefit from skilled PT to address impairments and return to PLOF      Plan: Continue per plan of care.  Progress treatment as tolerated.  Next session assess thoracic mobility.      Precautions: none  Functional Limitations: reaching and lifting overhead, shoulder exercises at the gym, sitting to drive and work on the computer  Impairments: thoracic mobility, bilateral scapular strength, bilateral shoulder strength  [email or web address] Code: [email or web address]   POC expiration: 10/18/2024      Manuals          L shoulder PROM nv 20' 18' 10'         Prone thoracic PA mobs grade 3/4    nv                                   Neuro Re-Ed             TB row BTB 2x10 HEP            TB shld ext BTB 2x10 HEP            TB ER nv                         KB OHP  5# KB  X10 ea 5#KB 2x10 ea KB OHP 2x10ea         Serratus wall slide w/ foam roll   2x10 2x10         TB serratus flexion   OTB 2x10 OTB 2x10         Ther Ex             UBE nv 2'/2' 2'/2' 2'/2'         Open books Nv? X15 ea 15ea          Seated thoracic ext in chair 10 HEP x10 15x 15x          Bent over row nv 3#  X10 ea 5# 2x10 ea 5#2x10         Bent over shld ext nv 3#  X10 ea 5# 2x10 ea 5# 2x10 ea         Supine serratus punch   3#  2x10 3# 2x10 ea         BL ER TB     OTB 2x10ea         CC lat pulldown  77#  10x2 77# 2x10 77# 2x10         CC row nv 77#  10x2 77# 2x10 77# 2x10         90/90 scaption walks    #5KB 2 laps ea                                   Ther Activity                                       Gait Training                                       Modalities

## 2024-10-03 ENCOUNTER — OFFICE VISIT (OUTPATIENT)
Dept: OBGYN CLINIC | Facility: CLINIC | Age: 44
End: 2024-10-03
Payer: COMMERCIAL

## 2024-10-03 DIAGNOSIS — M67.912 ROTATOR CUFF DYSFUNCTION, LEFT: Primary | ICD-10-CM

## 2024-10-03 PROCEDURE — 99213 OFFICE O/P EST LOW 20 MIN: CPT | Performed by: STUDENT IN AN ORGANIZED HEALTH CARE EDUCATION/TRAINING PROGRAM

## 2024-10-03 NOTE — PROGRESS NOTES
[facility] Follow Up Visit     Assesment:   44 y.o. male bilateral shoulder pain     Plan:  Patient returns for follow-up regarding his bilateral shoulders.  He continues to have significant pain in dysfunction in both shoulders, left greater than right.  He does describe a tingling sensation over the left scapula in addition.  On physical exam today demonstrates some weakness and after failing conservative treatment with several weeks of supervised physical therapy anti-inflammatory medications and rest I would like to obtain an MRI for additional evaluation.  Patient will follow-up after MRI for treatment recommendations    Conservative treatment:    Ice to shoulder 1-2 times daily, for 20 minutes at a time.  PT for ROM and strengthening to shoulder, rotator cuff, scapular stabilizers.      Imaging:    We will obtain an MRI of the shoulder to rule out rotator cuff tear of the left shoulder.      Injection:    No Injection planned at this time.      Surgery:     No surgery is recommended at this point, continue with conservative treatment plan as noted.      Follow up:    Return for follow up with Dr. Aponte once MRI has been read.      Chief Complaint   Patient presents with    Left Shoulder - Pain     Not much improvement     Right Shoulder - Pain     Right feels worse          History of Present Illness:    The patient is returns for 6 weeks follow up of bilateral shoulder with suspected rotator cuff tear and bicep tendinitis of the left shoulder. He states that he has been compliant with physical therapy but does state that he experiences some pain and discomfort after physical therapy. Today he states that he he feels that left has not improved while the right only bothers him while he is working or driving.  He denies any numbness and tingling at this time.    Shoulder Surgical History:  None    Past Medical, Social and Family History:  Past Medical History:   Diagnosis Date    Disease of  thyroid gland      Past Surgical History:   Procedure Laterality Date    LIPOMA RESECTION Bilateral 2013     Allergies   Allergen Reactions    Pollen Extract Allergic Rhinitis and Shortness Of Breath     Other reaction(s): Sneezing  Other reaction(s): Sneezing       Current Outpatient Medications on File Prior to Visit   Medication Sig Dispense Refill    levothyroxine 75 mcg tablet Take 1 tablet (75 mcg total) by mouth daily 90 tablet 1    Multiple Vitamin (Multivitamin Adult) TABS Take 1 tablet by mouth in the morning      omeprazole (PriLOSEC) 40 MG capsule Take 1 capsule (40 mg total) by mouth daily 90 capsule 3     No current facility-administered medications on file prior to visit.     Social History     Socioeconomic History    Marital status: /Civil Union     Spouse name: Not on file    Number of children: Not on file    Years of education: Not on file    Highest education level: Not on file   Occupational History    Not on file   Tobacco Use    Smoking status: Never     Passive exposure: Never    Smokeless tobacco: Never   Vaping Use    Vaping status: Never Used   Substance and Sexual Activity    Alcohol use: Not Currently     Alcohol/week: 2.0 standard drinks of alcohol     Comment: 2 drinks/month    Drug use: Never    Sexual activity: Yes     Partners: Female     Birth control/protection: Condom Male   Other Topics Concern    Not on file   Social History Narrative    Not on file     Social Determinants of Health     Financial Resource Strain: Not on file   Food Insecurity: Not on file   Transportation Needs: Not on file   Physical Activity: Not on file   Stress: Not on file   Social Connections: Not on file   Intimate Partner Violence: Not on file   Housing Stability: Not on file       I have reviewed the past medical, surgical, social and family history, medications and allergies as documented in the EMR.    Review of systems: ROS is negative other than that noted in the HPI.  Constitutional:  Negative for fatigue and fever.      Physical Exam:    There were no vitals taken for this visit.    General/Constitutional: NAD, well developed, well nourished  HENT: Normocephalic, atraumatic  CV: Intact distal pulses, regular rate  Resp: No respiratory distress or labored breathing  Lymphatic: No lymphadenopathy palpated  Neuro: Alert and Oriented x 3, no focal deficits  Psych: Normal mood, normal affect, normal judgement, normal behavior  Skin: Warm, dry, no rashes, no erythema      Shoulder focused exam:        Visual inspection of the left shoulder demonstrates normal contour without atrophy  No evidence of scapular dyskinesia or atrophy.  No scapular winging  Active and passive range of motion demonstrates forward flexion to 180, abduction to 120, external rotation is approximately 90with the arm in 90° of abduction, external rotation is 80 with the arm the side, internal rotation to T8.   Rotator cuff strength is 4/5 to resisted forward flexion on the left, 4+/5 resisted abduction on the left, 4/5 resisted external rotation, 5/5 resisted internal rotation  No tenderness to palpation at the distal clavicle, AC joint, acromion, or scapular spine  No pain with cross-body adduction  + Neer's test, - modified Torres impingement test  Negative external rotation lag sign  Negative belly press, negative lift-off  + speed's on he left shoulder  + tenderness to palpation at the bicipital groove    Visual inspection of the right shoulder demonstrates normal contour without atrophy  No scapular dyskinesia  Active range of motion demonstrates forward flexion 180, abduction 120, external rotation at 90 is 90, external Tatian with the arm at side is 75, internal Tatian to the lower thoracic  Rotator cuff strength is 5 out of 5 throughout  Positive Neer's and Torres  Positive speeds    UE NV Exam: +2 Radial pulses bilaterally  Sensation intact to light touch C5-T1 bilaterally, Radial/median/ulnar nerve motor  intact    Cervical ROM is full without pain, numbness or tingling      Shoulder Imaging    No imaging was performed today      Scribe Attestation      I,:  Kranthi Juarez am acting as a scribe while in the presence of the attending physician.:       I,:  Rene Aponte, DO personally performed the services described in this documentation    as scribed in my presence.:

## 2024-10-11 ENCOUNTER — APPOINTMENT (OUTPATIENT)
Dept: PHYSICAL THERAPY | Facility: CLINIC | Age: 44
End: 2024-10-11
Payer: COMMERCIAL

## 2024-10-18 ENCOUNTER — EVALUATION (OUTPATIENT)
Dept: PHYSICAL THERAPY | Facility: CLINIC | Age: 44
End: 2024-10-18
Payer: COMMERCIAL

## 2024-10-18 DIAGNOSIS — M25.511 BILATERAL SHOULDER PAIN, UNSPECIFIED CHRONICITY: Primary | ICD-10-CM

## 2024-10-18 DIAGNOSIS — M25.512 BILATERAL SHOULDER PAIN, UNSPECIFIED CHRONICITY: Primary | ICD-10-CM

## 2024-10-18 PROCEDURE — 97110 THERAPEUTIC EXERCISES: CPT

## 2024-10-18 PROCEDURE — 97140 MANUAL THERAPY 1/> REGIONS: CPT

## 2024-10-18 NOTE — PROGRESS NOTES
PT Re-Evaluation     Today's date: 10/18/2024  Patient name: Rayne Irizarry  : 1980  MRN: [id number]  Referring provider: Prabha Su PA-C  Dx:   Encounter Diagnosis     ICD-10-CM    1. Bilateral shoulder pain, unspecified chronicity  M25.511     M25.512                      Assessment  Impairments: abnormal or restricted ROM, activity intolerance, impaired physical strength, lacks appropriate home exercise program and pain with function  Language disorders: pragmatic language disorder    Assessment details: Rayne Irizarry is a pleasant 44 y.o. male who presents with chronic bilateral shoulder pain of insidious onset. He presents with normal and painfree R shoulder ROM, mildly limited R shoulder and scapular strength. He has mildly limited L shoulder abduction ROM with pain and full L shoulder flexion ROM with pain. He has mildly decreased L shoulder strength. Note minimally limited thoracic extension ROM, normal cervical ROM, and (-) median nerve tension bilaterally. These impairments are limiting him with lifting weight overhead for ADLs and for exercise at the gym as well as sitting for work at a desk/computer. He will benefit from skilled PT to address impairments and return to PLOF    10/18/2024: Rayne is being seen today for his 5th visit in our outpatient physical therapy clinic. His bilateral shoulder pain continues to be persistent and R shoulder is worsening per patient report. He has decreased AROM bilaterally more so L shoulder than R. He did respond well to posterior GHJ glides to both L and R shoulder getting improved ROM. He has mildly decreased L/R shoulder strength particularly in abduction and ER on L shoulder. These impairments continue to impact his ADLs including but not limited to work,driving, sitting for prolonged periods of time. The patient has an MRI of L shoulder scheduled and will follow up with Dr. Aponte for likely CSI. Patient requested hold on PT at this time until following up with  ortho.         Prognosis details: Positive prognostic indicators include positive attitude toward recovery, motivated to improve, high self-efficacy, good understanding of condition, realistic expectations.  Negative prognostic indicators include chronicity of symptoms, high symptom irritability,    Goals  STG to be achieved in 4 weeks  Patient will have improved L shoulder ROM to WNLs without pain (not met)  Patient will have improved gross BUE strength to 5/5 (some progress)  Improved thoracic extension ROM to WNLs (not assessed)  Patient will be independent with HEP. (Met)    LTG to be achieved in 8 weeks  Patient will be able to resume upper body lifting overhead when going to the gym (no progress)  Patient will be able to sit for work with minimal reproduction of symptoms ( no progress)  Patient will be able to drive with minimal to no reproduction of symptoms (minimal progress)      Plan  Patient would benefit from: skilled physical therapy  Planned modality interventions: cryotherapy and thermotherapy: hydrocollator packs    Planned therapy interventions: balance, home exercise program, gait training, functional ROM exercises, body mechanics training, joint mobilization, manual therapy, neuromuscular re-education, therapeutic exercise, therapeutic activities, stretching, strengthening, flexibility and patient/caregiver education    Plan of Care beginning date: 10/18/2024  Plan of Care expiration date: 12/13/2024  Treatment plan discussed with: patient        Subjective Evaluation    History of Present Illness  Mechanism of injury: Patient has had bilateral shoulder pain    Patient reports chronic bilateral shoulder pain of insidious onset. Notes L shoulder pain may have been brought on after bumping into a window/window sill a few years ago. 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     10/18/2024: Patient is still having bilateral shoulder pain. He states that throughout the day L shoulder pain is worse, but R shoulder pain is worse when sitting at his desk. He says he is getting MRI Monday and likely an injection with Dr. Aopnte.   Patient Goals  Patient goal: Patient would like to improve posture and get back into working out.  Pain  Current pain ratin  At best pain ratin  At worst pain ratin          Objective     Active Range of Motion   Cervical/Thoracic Spine       Cervical    Flexion: 50 degrees   Extension: 47 degrees      Left rotation: 66 degrees  Right rotation: 70 degrees       Thoracic    Right lateral flexion:  Restriction level: minimal  Left rotation:  Restriction level: minimal  Right rotation:  Restriction level: minimal  Left Shoulder   Flexion: 170 degrees WFL and with pain  Abduction: 155 degrees with pain  External rotation 90°: WFL and with pain  Internal rotation 90°: WFL and with pain    Right Shoulder   Flexion: 160 degrees WFL  Abduction: 145 degrees WFL  External rotation 90°: WFL  Internal rotation 90°: WFL and with pain    Strength/Myotome Testing     Left Shoulder     Planes of Motion   Flexion: 4   Extension: 4+   Abduction: 4+ (pain)   External rotation at 0°: 4 (pain)   Internal rotation at 0°: 5   Horizontal abduction: 3+ (pain)     Isolated Muscles   Lower trapezius: 3+ (pain)     Right Shoulder     Planes of Motion   Flexion: 5   Extension: 5   Abduction: 4+   External rotation at 0°: 5 (Pain!)   Internal rotation at 0°: 5   Horizontal abduction: 4     Isolated Muscles   Lower trapezius: 4     Tests     Left Shoulder   Negative ULTT1.              Precautions: none  Functional Limitations: reaching and lifting overhead, shoulder exercises at the gym, sitting to drive and work on the computer  Impairments: thoracic mobility,  bilateral scapular strength, bilateral shoulder strength  [email or web address] Code: [email or web address]   POC expiration: 12/13/2024       POC Expires Reeval for Medicare to be completed Unit LImit Auth Expiration Date PT/OT/STVisit Limit   12/13/2024 By visit n/a N/a N/a BOMN                     Auth Status: n/a 8/23 8/27 9/4 9/27 10/18        Visit #     5        Visits remaining n/a             Manuals             L shoulder PROM nv 20' 18' 10'         Prone thoracic PA mobs grade 3/4    nv                                   Neuro Re-Ed             TB row BTB 2x10 HEP            TB shld ext BTB 2x10 HEP            TB ER nv                         KB OHP  5# KB  X10 ea 5#KB 2x10 ea KB OHP 2x10ea         Serratus wall slide w/ foam roll   2x10 2x10         TB serratus flexion   OTB 2x10 OTB 2x10         Ther Ex             UBE nv 2'/2' 2'/2' 2'/2'         Open books Nv? X15 ea 15ea          Seated thoracic ext in chair 10 HEP x10 15x 15x         Bent over row nv 3#  X10 ea 5# 2x10 ea 5#2x10         Bent over shld ext nv 3#  X10 ea 5# 2x10 ea 5# 2x10 ea         Supine serratus punch   3#  2x10 3# 2x10 ea         BL ER TB     OTB 2x10ea         CC lat pulldown  77#  10x2 77# 2x10 77# 2x10         CC row nv 77#  10x2 77# 2x10 77# 2x10         90/90 scaption walks    #5KB 2 laps ea                                   Ther Activity                                       Gait Training                                       Modalities

## 2024-10-21 ENCOUNTER — HOSPITAL ENCOUNTER (OUTPATIENT)
Dept: RADIOLOGY | Facility: HOSPITAL | Age: 44
Discharge: HOME | End: 2024-10-21
Attending: STUDENT IN AN ORGANIZED HEALTH CARE EDUCATION/TRAINING PROGRAM
Payer: COMMERCIAL

## 2024-10-21 DIAGNOSIS — M67.912 ROTATOR CUFF DYSFUNCTION, LEFT: ICD-10-CM

## 2024-10-21 PROCEDURE — 73221 MRI JOINT UPR EXTREM W/O DYE: CPT

## 2024-10-24 ENCOUNTER — TELEPHONE (OUTPATIENT)
Age: 44
End: 2024-10-24

## 2024-10-24 ENCOUNTER — OFFICE VISIT (OUTPATIENT)
Dept: GASTROENTEROLOGY | Facility: CLINIC | Age: 44
End: 2024-10-24
Payer: COMMERCIAL

## 2024-10-24 VITALS
BODY MASS INDEX: 28.35 KG/M2 | HEIGHT: 69 IN | DIASTOLIC BLOOD PRESSURE: 88 MMHG | WEIGHT: 191.4 LBS | SYSTOLIC BLOOD PRESSURE: 126 MMHG

## 2024-10-24 DIAGNOSIS — B96.81 HELICOBACTER PYLORI GASTRITIS: ICD-10-CM

## 2024-10-24 DIAGNOSIS — K21.00 GASTROESOPHAGEAL REFLUX DISEASE WITH ESOPHAGITIS WITHOUT HEMORRHAGE: Primary | ICD-10-CM

## 2024-10-24 DIAGNOSIS — Z12.11 COLON CANCER SCREENING: ICD-10-CM

## 2024-10-24 DIAGNOSIS — K29.70 HELICOBACTER PYLORI GASTRITIS: ICD-10-CM

## 2024-10-24 DIAGNOSIS — K22.10 EROSIVE ESOPHAGITIS: ICD-10-CM

## 2024-10-24 PROCEDURE — 99213 OFFICE O/P EST LOW 20 MIN: CPT | Performed by: INTERNAL MEDICINE

## 2024-10-24 RX ORDER — OMEPRAZOLE 40 MG/1
40 CAPSULE, DELAYED RELEASE ORAL DAILY
Qty: 90 CAPSULE | Refills: 3 | Status: SHIPPED | OUTPATIENT
Start: 2024-10-24

## 2024-10-24 NOTE — PROGRESS NOTES
[facility] - Outpatient Follow-up Note  Rayne Irizarry 44 y.o. male MRN: [id number]  Encounter: [id number]    ASSESSMENT AND PLAN:      1. Gastroesophageal reflux disease with esophagitis without hemorrhage  Upper endoscopy October 2023 showed erosive esophagitis with stricturing.  Dysphagia improved with dilation.  At his April visit he complained of some recurrent symptoms with these have resolved so we will cancel the barium swallow.    Continue antireflux diet and weight loss efforts  We discussed a calorie counting mat to help gradual weight loss  Okay to use omeprazole for short courses.  He will contact me if symptoms return    2. Helicobacter pylori gastritis  Treated with quadruple therapy.  Now that he is off daily acid suppression, I ordered a breath test to confirm eradication    3. Colon cancer screening  Negative colonoscopy October 2023, 10-year recall    Followup Appointment: 1 year  ______________________________________________________________________    Chief Complaint   Patient presents with    Follow-up     Pt states he is doing well, no concerns. Needs refill of omeprazole.     HPI: The patient presents for follow-up on reflux esophagitis, dysphagia, and H. pylori gastritis.  He was last seen in April.  At that visit he said swallowing issues resolved with dilation, then were starting to recur.  I recommended a barium swallow, but symptoms have mostly resolved so he did not schedule it yet.  He rarely has swallowing issues, typically only occurring with dry food like bread, symptoms resolve immediately with water.  He rarely has substernal burning.  He will take a few days of omeprazole and it resolves completely.  He is very satisfied with symptom improvement.    Historical Information   Past Medical History:   Diagnosis Date    Disease of thyroid gland      Past Surgical History:   Procedure Laterality Date    EGD AND COLONOSCOPY  11/10/2023    LIPOMA  "RESECTION Bilateral 2013     Social History     Substance and Sexual Activity   Alcohol Use Not Currently    Alcohol/week: 2.0 standard drinks of alcohol    Comment: 2 drinks/month     Social History     Substance and Sexual Activity   Drug Use Never     Social History     Tobacco Use   Smoking Status Never    Passive exposure: Never   Smokeless Tobacco Never     Family History   Problem Relation Age of Onset    Hypertension Mother     Diabetes Mother     Glaucoma Father     No Known Problems Maternal Grandmother     No Known Problems Maternal Grandfather     No Known Problems Paternal Grandmother     No Known Problems Paternal Grandfather     Colon cancer Neg Hx          Current Outpatient Medications:     levothyroxine 75 mcg tablet    Multiple Vitamin (Multivitamin Adult) TABS    omeprazole (PriLOSEC) 40 MG capsule  Allergies   Allergen Reactions    Pollen Extract Allergic Rhinitis and Shortness Of Breath     Other reaction(s): Sneezing  Other reaction(s): Sneezing       Reviewed medications and allergies and updated as indicated    PHYSICAL EXAM:    Blood pressure 126/88, height 5' 8.5\" (1.74 m), weight 86.8 kg (191 lb 6.4 oz). Body mass index is 28.68 kg/m².  General Appearance: NAD, cooperative, alert  Eyes: Anicteric, conjunctiva pink  ENT:  Normocephalic, atraumatic, normal mucosa.    Neck:  Supple, symmetrical, trachea midline  Resp:  Clear to auscultation bilaterally; no rales, rhonchi or wheezing; respirations unlabored   CV:  S1 S2, Regular rate and rhythm; no murmur, rub, or gallop.  GI:  Soft, non-tender, non-distended; normal bowel sounds; no masses, no organomegaly   Rectal: Deferred  Musculoskeletal: No cyanosis, clubbing or edema. Normal ROM.  Skin:  No jaundice, rashes, or lesions   Heme/Lymph: No palpable cervical lymphadenopathy  Psych: Normal affect, good eye contact  Neuro: No gross deficits, AAOx3    Lab Results:   Lab Results   Component Value Date    WBC 5.52 05/31/2024    HGB 16.8 " 05/31/2024    HCT 51.5 (H) 05/31/2024    MCV 88 05/31/2024     05/31/2024     Lab Results   Component Value Date    K 4.0 05/31/2024     05/31/2024    CO2 24 05/31/2024    BUN 9 05/31/2024    CREATININE 0.98 05/31/2024    GLUF 71 04/26/2024    CALCIUM 9.0 05/31/2024    AST 21 05/31/2024    ALT 25 05/31/2024    ALKPHOS 75 05/31/2024    EGFR 92 05/31/2024       Radiology Results:   No results found.

## 2024-11-14 ENCOUNTER — OFFICE VISIT (OUTPATIENT)
Dept: OBGYN CLINIC | Facility: CLINIC | Age: 44
End: 2024-11-14
Payer: COMMERCIAL

## 2024-11-14 ENCOUNTER — PREP FOR PROCEDURE (OUTPATIENT)
Dept: OBGYN CLINIC | Facility: CLINIC | Age: 44
End: 2024-11-14

## 2024-11-14 VITALS
BODY MASS INDEX: 28.44 KG/M2 | HEIGHT: 69 IN | SYSTOLIC BLOOD PRESSURE: 120 MMHG | HEART RATE: 74 BPM | DIASTOLIC BLOOD PRESSURE: 81 MMHG | WEIGHT: 192 LBS

## 2024-11-14 DIAGNOSIS — G89.29 CHRONIC RIGHT SHOULDER PAIN: ICD-10-CM

## 2024-11-14 DIAGNOSIS — M25.511 CHRONIC RIGHT SHOULDER PAIN: ICD-10-CM

## 2024-11-14 DIAGNOSIS — M75.102 TEAR OF LEFT SUPRASPINATUS TENDON: Primary | ICD-10-CM

## 2024-11-14 PROCEDURE — 99214 OFFICE O/P EST MOD 30 MIN: CPT | Performed by: STUDENT IN AN ORGANIZED HEALTH CARE EDUCATION/TRAINING PROGRAM

## 2024-11-14 RX ORDER — CHLORHEXIDINE GLUCONATE ORAL RINSE 1.2 MG/ML
15 SOLUTION DENTAL ONCE
OUTPATIENT
Start: 2024-11-14 | End: 2024-11-14

## 2024-11-14 NOTE — PROGRESS NOTES
[facility] Sports Medicine Shoulder Follow Up Visit     Assesment:   44 y.o. male left shoulder rotator cuff tear and right shoulder pain    Plan:The patient's diagnosis and treatment were discussed at length today. We discussed no treatment, non-operative treatment, and operative treatment.    Rayne presents today for left shoulder MRI review. After reviewing the images, I recommended surgical intervention to address the left shoulder full thickness rotator cuff tear.  He has previously attempted physical therapy with worsening of his pain.  He has ongoing dysfunction I explained that small full-thickness tears will progress over time and would recommend addressing this acutely given his young age, activity level, and risks of healing if toe becomes chronic.  We discussed that without surgical intervention, the tear would likely worsen over time and significantly retract. The patient is agreeable and would like to proceed with Arthroscopic rotator cuff repair of the left shoulder. I mentioned concerns for the ongoing right shoulder pain, and recommended he get an MRI for further evaluation to rule out a rotator cuff tear of the right shoulder. He will follow up for MRI review once images are received.    Conservative treatment:    Ice to shoulder 1-2 times daily, for 20 minutes at a time.      Imaging:    All imaging from today was reviewed by myself and explained to the patient.       Injection:    No Injection planned at this time.      Surgery:     All of the risks and benefits of operative treatment were explained to the patient, as well as the risks and benefits of any alternative treatment options, including nonoperative care. This risks of surgery include, but are not limited to, infection, bleeding, blood clot, damage to nerves/arteries, need for further surgyer, cardiovascular risk, anesthesia risk, and continued pain.  The patient understood this and elects to proceed forward with surgical intervention.  We  will proceed forward with surgical arthroscopy of the shoulder with left shoulder rotator cuff repair       Follow up:    No follow-ups on file.      Chief Complaint   Patient presents with    Left Shoulder - Pain     MRI done 10/21/24          History of Present Illness:    The patient is returns for follow up for left shoulder MRI review.  Since the prior visit, He reports no improvement. He notes pain with overhead activity and reaching away from the body. He reports a tingling sensation in the scapular region with activity. He was compliant with multiple outpatient PT visits, but has since discontinued due to increased pain. His current pain is managed with rest, OTC NSAID's and ice as needed.        Shoulder Surgical History:  None    Past Medical, Social and Family History:  Past Medical History:   Diagnosis Date    Disease of thyroid gland      Past Surgical History:   Procedure Laterality Date    EGD AND COLONOSCOPY  11/10/2023    LIPOMA RESECTION Bilateral 2013     Allergies   Allergen Reactions    Pollen Extract Allergic Rhinitis and Shortness Of Breath     Other reaction(s): Sneezing  Other reaction(s): Sneezing       Current Outpatient Medications on File Prior to Visit   Medication Sig Dispense Refill    levothyroxine 75 mcg tablet Take 1 tablet (75 mcg total) by mouth daily 90 tablet 1    Multiple Vitamin (Multivitamin Adult) TABS Take 1 tablet by mouth in the morning      omeprazole (PriLOSEC) 40 MG capsule Take 1 capsule (40 mg total) by mouth daily 90 capsule 3     No current facility-administered medications on file prior to visit.     Social History     Socioeconomic History    Marital status: /Civil Union     Spouse name: Not on file    Number of children: Not on file    Years of education: Not on file    Highest education level: Not on file   Occupational History    Not on file   Tobacco Use    Smoking status: Never     Passive exposure: Never    Smokeless tobacco: Never   Vaping Use     "Vaping status: Never Used   Substance and Sexual Activity    Alcohol use: Not Currently     Alcohol/week: 2.0 standard drinks of alcohol     Comment: 2 drinks/month    Drug use: Never    Sexual activity: Yes     Partners: Female     Birth control/protection: Condom Male   Other Topics Concern    Not on file   Social History Narrative    Not on file     Social Drivers of Health     Financial Resource Strain: Not on file   Food Insecurity: Not on file   Transportation Needs: Not on file   Physical Activity: Not on file   Stress: Not on file   Social Connections: Not on file   Intimate Partner Violence: Not on file   Housing Stability: Not on file       I have reviewed the past medical, surgical, social and family history, medications and allergies as documented in the EMR.    Review of systems: ROS is negative other than that noted in the HPI.  Constitutional: Negative for fatigue and fever.      Physical Exam:    Blood pressure 120/81, pulse 74, height 5' 8.5\" (1.74 m), weight 87.1 kg (192 lb).    General/Constitutional: NAD, well developed, well nourished  HENT: Normocephalic, atraumatic  CV: Intact distal pulses, regular rate  Resp: No respiratory distress or labored breathing  Lymphatic: No lymphadenopathy palpated  Neuro: Alert and Oriented x 3, no focal deficits  Psych: Normal mood, normal affect, normal judgement, normal behavior  Skin: Warm, dry, no rashes, no erythema      Shoulder focused exam:        Visual inspection of the left shoulder demonstrates normal contour without atrophy  No evidence of scapular dyskinesia or atrophy.  No scapular winging  Active and passive range of motion demonstrates forward flexion to 180, abduction to 120, external rotation is approximately 90 with the arm in 90° of abduction, external rotation is 80 with the arm the side, internal rotation to T8.   Rotator cuff strength is 4/5 to resisted forward flexion on the left, 4+/5 resisted abduction on the left, 4/5 resisted " external rotation, 5/5 resisted internal rotation  No tenderness to palpation at the distal clavicle, AC joint, acromion, or scapular spine  No pain with cross-body adduction  + Neer's test, - modified Torres impingement test  Negative external rotation lag sign  Negative belly press, negative lift-off  + speed's   + tenderness to palpation at the bicipital groove      Visual inspection of the right shoulder demonstrates normal contour without atrophy  No scapular dyskinesia  Active range of motion demonstrates forward flexion 180, abduction 120, external rotation at 90 is 90, external rotation with the arm at side is 75, internal Tatian to the lower thoracic  Rotator cuff strength is 4/5 to resisted forward flexion on the left, 4/5 resisted abduction on the left, 4+/5 resisted external rotation, 5/5 resisted internal rotation  - Neer's and - Torres  Negative belly press, negative lift-off  + speeds    UE NV Exam: +2 Radial pulses bilaterally  Sensation intact to light touch C5-T1 bilaterally, Radial/median/ulnar nerve motor intact    Cervical ROM is full without pain, numbness or tingling      Shoulder Imaging    No imaging was performed today      Scribe Attestation      I,:  Jamin Lopez am acting as a scribe while in the presence of the attending physician.:       I,:  Rene Aponte, DO personally performed the services described in this documentation    as scribed in my presence.:

## 2024-12-12 ENCOUNTER — HOSPITAL ENCOUNTER (OUTPATIENT)
Dept: MRI IMAGING | Facility: HOSPITAL | Age: 44
End: 2024-12-12
Payer: COMMERCIAL

## 2024-12-12 DIAGNOSIS — M25.511 CHRONIC RIGHT SHOULDER PAIN: ICD-10-CM

## 2024-12-12 DIAGNOSIS — G89.29 CHRONIC RIGHT SHOULDER PAIN: ICD-10-CM

## 2024-12-12 PROCEDURE — 73221 MRI JOINT UPR EXTREM W/O DYE: CPT

## 2024-12-13 NOTE — PRE-PROCEDURE INSTRUCTIONS
Pre-Surgery Instructions:   Medication Instructions    levothyroxine 75 mcg tablet Take day of surgery.    omeprazole (PriLOSEC) 40 MG capsule Take day of surgery.   Medication instructions for day surgery reviewed. Please use only a sip of water to take your instructed medications. Avoid all over the counter vitamins, supplements and NSAIDS for one week prior to surgery per anesthesia guidelines. Tylenol is ok to take as needed.     You will receive a call one business day prior to surgery with an arrival time and hospital directions. If your surgery is scheduled on a Monday, the hospital will be calling you on the Friday prior to your surgery. If you have not heard from anyone by 8pm, please call the hospital supervisor through the hospital  at [phone]. ([place] [phone] or [place] [phone]).    Do not eat or drink anything after midnight the night before your surgery, including candy, mints, lifesavers, or chewing gum. Do not drink alcohol 24hrs before your surgery. Try not to smoke at least 24hrs before your surgery.       Follow the pre surgery showering instructions as listed in the “My Surgical Experience Booklet” or otherwise provided by your surgeon's office. Do not use a blade to shave the surgical area 1 week before surgery. It is okay to use a clean electric clippers up to 24 hours before surgery. Do not apply any lotions, creams, including makeup, cologne, deodorant, or perfumes after showering on the day of your surgery. Do not use dry shampoo, hair spray, hair gel, or any type of hair products.     No contact lenses, eye make-up, or artificial eyelashes. Remove nail polish, including gel polish, and any artificial, gel, or acrylic nails if possible. Remove all jewelry including rings and body piercing jewelry.     Wear causal clothing that is easy to take on and off. Consider your type of surgery.    Keep any valuables, jewelry, piercings at home. Please bring any specially  ordered equipment (sling, braces) if indicated.    Arrange for a responsible person to drive you to and from the hospital on the day of your surgery. Please confirm the visitor policy for the day of your procedure when you receive your phone call with an arrival time.     Call the surgeon's office with any new illnesses, exposures, or additional questions prior to surgery.    Please reference your “My Surgical Experience Booklet” for additional information to prepare for your upcoming surgery.

## 2024-12-16 ENCOUNTER — ANESTHESIA EVENT (OUTPATIENT)
Dept: PERIOP | Facility: HOSPITAL | Age: 44
End: 2024-12-16
Payer: COMMERCIAL

## 2024-12-17 ENCOUNTER — HOSPITAL ENCOUNTER (OUTPATIENT)
Facility: HOSPITAL | Age: 44
Setting detail: OUTPATIENT SURGERY
Discharge: HOME | End: 2024-12-17
Attending: STUDENT IN AN ORGANIZED HEALTH CARE EDUCATION/TRAINING PROGRAM | Admitting: STUDENT IN AN ORGANIZED HEALTH CARE EDUCATION/TRAINING PROGRAM
Payer: COMMERCIAL

## 2024-12-17 ENCOUNTER — ANESTHESIA (OUTPATIENT)
Dept: PERIOP | Facility: HOSPITAL | Age: 44
End: 2024-12-17
Payer: COMMERCIAL

## 2024-12-17 VITALS
WEIGHT: 195 LBS | SYSTOLIC BLOOD PRESSURE: 137 MMHG | DIASTOLIC BLOOD PRESSURE: 67 MMHG | TEMPERATURE: 97.6 F | HEIGHT: 68 IN | BODY MASS INDEX: 29.55 KG/M2 | RESPIRATION RATE: 17 BRPM | HEART RATE: 84 BPM | OXYGEN SATURATION: 99 %

## 2024-12-17 DIAGNOSIS — Z98.890 S/P ARTHROSCOPY OF LEFT SHOULDER: Primary | ICD-10-CM

## 2024-12-17 PROCEDURE — 29827 SHO ARTHRS SRG RT8TR CUF RPR: CPT | Performed by: STUDENT IN AN ORGANIZED HEALTH CARE EDUCATION/TRAINING PROGRAM

## 2024-12-17 PROCEDURE — 29827 SHO ARTHRS SRG RT8TR CUF RPR: CPT

## 2024-12-17 PROCEDURE — C9290 INJ, BUPIVACAINE LIPOSOME: HCPCS | Performed by: STUDENT IN AN ORGANIZED HEALTH CARE EDUCATION/TRAINING PROGRAM

## 2024-12-17 PROCEDURE — 29826 SHO ARTHRS SRG DECOMPRESSION: CPT | Performed by: STUDENT IN AN ORGANIZED HEALTH CARE EDUCATION/TRAINING PROGRAM

## 2024-12-17 PROCEDURE — 29826 SHO ARTHRS SRG DECOMPRESSION: CPT

## 2024-12-17 PROCEDURE — NC001 PR NO CHARGE: Performed by: STUDENT IN AN ORGANIZED HEALTH CARE EDUCATION/TRAINING PROGRAM

## 2024-12-17 PROCEDURE — C1713 ANCHOR/SCREW BN/BN,TIS/BN: HCPCS | Performed by: STUDENT IN AN ORGANIZED HEALTH CARE EDUCATION/TRAINING PROGRAM

## 2024-12-17 DEVICE — SELF-PUNCHING TRIPLE LOADED FIBERTAK
Type: IMPLANTABLE DEVICE | Site: SHOULDER | Status: FUNCTIONAL
Brand: ARTHREX®

## 2024-12-17 RX ORDER — ONDANSETRON 2 MG/ML
4 INJECTION INTRAMUSCULAR; INTRAVENOUS EVERY 6 HOURS PRN
Status: CANCELLED | OUTPATIENT
Start: 2024-12-17

## 2024-12-17 RX ORDER — FENTANYL CITRATE 50 UG/ML
INJECTION, SOLUTION INTRAMUSCULAR; INTRAVENOUS
Status: COMPLETED | OUTPATIENT
Start: 2024-12-17 | End: 2024-12-17

## 2024-12-17 RX ORDER — HYDROMORPHONE HCL/PF 1 MG/ML
0.5 SYRINGE (ML) INJECTION
Status: DISCONTINUED | OUTPATIENT
Start: 2024-12-17 | End: 2024-12-17 | Stop reason: HOSPADM

## 2024-12-17 RX ORDER — ONDANSETRON 2 MG/ML
4 INJECTION INTRAMUSCULAR; INTRAVENOUS ONCE AS NEEDED
Status: COMPLETED | OUTPATIENT
Start: 2024-12-17 | End: 2024-12-17

## 2024-12-17 RX ORDER — MECLIZINE HCL 12.5 MG 12.5 MG/1
25 TABLET ORAL ONCE
Status: DISCONTINUED | OUTPATIENT
Start: 2024-12-17 | End: 2024-12-17 | Stop reason: HOSPADM

## 2024-12-17 RX ORDER — CEFAZOLIN SODIUM 2 G/50ML
2000 SOLUTION INTRAVENOUS ONCE
Status: COMPLETED | OUTPATIENT
Start: 2024-12-17 | End: 2024-12-17

## 2024-12-17 RX ORDER — PROPOFOL 10 MG/ML
INJECTION, EMULSION INTRAVENOUS AS NEEDED
Status: DISCONTINUED | OUTPATIENT
Start: 2024-12-17 | End: 2024-12-17

## 2024-12-17 RX ORDER — TRANEXAMIC ACID 10 MG/ML
1000 INJECTION, SOLUTION INTRAVENOUS ONCE
Status: COMPLETED | OUTPATIENT
Start: 2024-12-17 | End: 2024-12-17

## 2024-12-17 RX ORDER — ACETAMINOPHEN 325 MG/1
975 TABLET ORAL EVERY 8 HOURS
Status: CANCELLED | OUTPATIENT
Start: 2024-12-17

## 2024-12-17 RX ORDER — OXYCODONE HYDROCHLORIDE 5 MG/1
10 TABLET ORAL EVERY 4 HOURS PRN
Status: DISCONTINUED | OUTPATIENT
Start: 2024-12-17 | End: 2024-12-17 | Stop reason: HOSPADM

## 2024-12-17 RX ORDER — SODIUM CHLORIDE, SODIUM LACTATE, POTASSIUM CHLORIDE, CALCIUM CHLORIDE 600; 310; 30; 20 MG/100ML; MG/100ML; MG/100ML; MG/100ML
125 INJECTION, SOLUTION INTRAVENOUS CONTINUOUS
Status: DISCONTINUED | OUTPATIENT
Start: 2024-12-17 | End: 2024-12-17 | Stop reason: HOSPADM

## 2024-12-17 RX ORDER — FENTANYL CITRATE/PF 50 MCG/ML
25 SYRINGE (ML) INJECTION
Status: DISCONTINUED | OUTPATIENT
Start: 2024-12-17 | End: 2024-12-17 | Stop reason: HOSPADM

## 2024-12-17 RX ORDER — BUPIVACAINE HYDROCHLORIDE 5 MG/ML
INJECTION, SOLUTION EPIDURAL; INTRACAUDAL
Status: COMPLETED | OUTPATIENT
Start: 2024-12-17 | End: 2024-12-17

## 2024-12-17 RX ORDER — OXYCODONE HYDROCHLORIDE 5 MG/1
5 TABLET ORAL EVERY 4 HOURS PRN
Status: DISCONTINUED | OUTPATIENT
Start: 2024-12-17 | End: 2024-12-17 | Stop reason: HOSPADM

## 2024-12-17 RX ORDER — ACETAMINOPHEN 500 MG
1000 TABLET ORAL EVERY 6 HOURS PRN
Qty: 56 TABLET | Refills: 0 | Status: SHIPPED | OUTPATIENT
Start: 2024-12-17 | End: 2024-12-31

## 2024-12-17 RX ORDER — ROCURONIUM BROMIDE 10 MG/ML
INJECTION, SOLUTION INTRAVENOUS AS NEEDED
Status: DISCONTINUED | OUTPATIENT
Start: 2024-12-17 | End: 2024-12-17

## 2024-12-17 RX ORDER — MIDAZOLAM HYDROCHLORIDE 2 MG/2ML
INJECTION, SOLUTION INTRAMUSCULAR; INTRAVENOUS
Status: COMPLETED | OUTPATIENT
Start: 2024-12-17 | End: 2024-12-17

## 2024-12-17 RX ORDER — NAPROXEN 500 MG/1
500 TABLET ORAL 2 TIMES DAILY WITH MEALS
Qty: 28 TABLET | Refills: 0 | Status: SHIPPED | OUTPATIENT
Start: 2024-12-17 | End: 2024-12-31

## 2024-12-17 RX ORDER — GLYCOPYRROLATE 0.2 MG/ML
INJECTION INTRAMUSCULAR; INTRAVENOUS AS NEEDED
Status: DISCONTINUED | OUTPATIENT
Start: 2024-12-17 | End: 2024-12-17

## 2024-12-17 RX ORDER — ONDANSETRON 4 MG/1
4 TABLET, FILM COATED ORAL EVERY 8 HOURS PRN
Qty: 20 TABLET | Refills: 0 | Status: SHIPPED | OUTPATIENT
Start: 2024-12-17

## 2024-12-17 RX ORDER — CHLORHEXIDINE GLUCONATE ORAL RINSE 1.2 MG/ML
15 SOLUTION DENTAL ONCE
Status: COMPLETED | OUTPATIENT
Start: 2024-12-17 | End: 2024-12-17

## 2024-12-17 RX ORDER — LIDOCAINE HYDROCHLORIDE 10 MG/ML
INJECTION, SOLUTION EPIDURAL; INFILTRATION; INTRACAUDAL; PERINEURAL AS NEEDED
Status: DISCONTINUED | OUTPATIENT
Start: 2024-12-17 | End: 2024-12-17

## 2024-12-17 RX ORDER — OXYCODONE HYDROCHLORIDE 5 MG/1
5 TABLET ORAL EVERY 4 HOURS PRN
Qty: 10 TABLET | Refills: 0 | Status: SHIPPED | OUTPATIENT
Start: 2024-12-17

## 2024-12-17 RX ORDER — DIPHENHYDRAMINE HYDROCHLORIDE 50 MG/ML
12.5 INJECTION INTRAMUSCULAR; INTRAVENOUS ONCE AS NEEDED
Status: COMPLETED | OUTPATIENT
Start: 2024-12-17 | End: 2024-12-17

## 2024-12-17 RX ORDER — EPHEDRINE SULFATE 50 MG/ML
INJECTION INTRAVENOUS AS NEEDED
Status: DISCONTINUED | OUTPATIENT
Start: 2024-12-17 | End: 2024-12-17

## 2024-12-17 RX ORDER — ASPIRIN 81 MG/1
81 TABLET, CHEWABLE ORAL 2 TIMES DAILY
Qty: 60 TABLET | Refills: 0 | Status: SHIPPED | OUTPATIENT
Start: 2024-12-17 | End: 2025-01-16

## 2024-12-17 RX ADMIN — BUPIVACAINE HYDROCHLORIDE 7 ML: 5 INJECTION, SOLUTION EPIDURAL; INTRACAUDAL; PERINEURAL at 10:41

## 2024-12-17 RX ADMIN — MIDAZOLAM 2 MG: 1 INJECTION INTRAMUSCULAR; INTRAVENOUS at 10:41

## 2024-12-17 RX ADMIN — CEFAZOLIN SODIUM 2000 MG: 2 SOLUTION INTRAVENOUS at 11:38

## 2024-12-17 RX ADMIN — FENTANYL CITRATE 50 MCG: 50 INJECTION, SOLUTION INTRAMUSCULAR; INTRAVENOUS at 10:41

## 2024-12-17 RX ADMIN — BUPIVACAINE 20 ML: 13.3 INJECTION, SUSPENSION, LIPOSOMAL INFILTRATION at 10:41

## 2024-12-17 RX ADMIN — FENTANYL CITRATE 25 MCG: 50 INJECTION INTRAMUSCULAR; INTRAVENOUS at 14:10

## 2024-12-17 RX ADMIN — TRANEXAMIC ACID 1000 MG: 10 INJECTION, SOLUTION INTRAVENOUS at 11:38

## 2024-12-17 RX ADMIN — PROPOFOL 200 MG: 10 INJECTION, EMULSION INTRAVENOUS at 11:33

## 2024-12-17 RX ADMIN — FENTANYL CITRATE 50 MCG: 50 INJECTION, SOLUTION INTRAMUSCULAR; INTRAVENOUS at 11:33

## 2024-12-17 RX ADMIN — ONDANSETRON 4 MG: 2 INJECTION INTRAMUSCULAR; INTRAVENOUS at 14:36

## 2024-12-17 RX ADMIN — SODIUM CHLORIDE, SODIUM LACTATE, POTASSIUM CHLORIDE, AND CALCIUM CHLORIDE: .6; .31; .03; .02 INJECTION, SOLUTION INTRAVENOUS at 12:29

## 2024-12-17 RX ADMIN — SODIUM CHLORIDE, SODIUM LACTATE, POTASSIUM CHLORIDE, AND CALCIUM CHLORIDE: .6; .31; .03; .02 INJECTION, SOLUTION INTRAVENOUS at 11:22

## 2024-12-17 RX ADMIN — DIPHENHYDRAMINE HYDROCHLORIDE 12.5 MG: 50 INJECTION, SOLUTION INTRAMUSCULAR; INTRAVENOUS at 14:47

## 2024-12-17 RX ADMIN — GLYCOPYRROLATE 0.2 MG: 0.2 INJECTION, SOLUTION INTRAMUSCULAR; INTRAVENOUS at 13:25

## 2024-12-17 RX ADMIN — SUGAMMADEX 200 MG: 100 INJECTION, SOLUTION INTRAVENOUS at 13:19

## 2024-12-17 RX ADMIN — LIDOCAINE HYDROCHLORIDE 30 MG: 10 INJECTION, SOLUTION EPIDURAL; INFILTRATION; INTRACAUDAL; PERINEURAL at 11:33

## 2024-12-17 RX ADMIN — EPHEDRINE SULFATE 10 MG: 50 INJECTION INTRAVENOUS at 12:17

## 2024-12-17 RX ADMIN — EPHEDRINE SULFATE 10 MG: 50 INJECTION INTRAVENOUS at 13:18

## 2024-12-17 RX ADMIN — CHLORHEXIDINE GLUCONATE 15 ML: 1.2 RINSE ORAL at 09:47

## 2024-12-17 RX ADMIN — EPHEDRINE SULFATE 10 MG: 50 INJECTION INTRAVENOUS at 12:32

## 2024-12-17 RX ADMIN — ROCURONIUM BROMIDE 50 MG: 10 INJECTION, SOLUTION INTRAVENOUS at 11:33

## 2024-12-17 NOTE — ANESTHESIA PROCEDURE NOTES
Peripheral Block    Patient location during procedure: holding area  Start time: 12/17/2024 10:41 AM  Reason for block: at surgeon's request and post-op pain management  Staffing  Performed by: Tawanna Dunlap MD  Authorized by: Tawanna Dunlap MD    Preanesthetic Checklist  Completed: patient identified, IV checked, site marked, risks and benefits discussed, surgical consent, monitors and equipment checked, pre-op evaluation and timeout performed  Peripheral Block  Patient position: sitting  Prep: ChloraPrep  Patient monitoring: frequent blood pressure checks, continuous pulse oximetry and heart rate  Block type: Interscalene  Laterality: left  Injection technique: single-shot  Procedures: ultrasound guided, Ultrasound guidance required for the procedure to increase accuracy and safety of medication placement and decrease risk of complications.  Ultrasound permanent image saved  bupivacaine (PF) (MARCAINE) 0.5 % injection 20 mL - Perineural   7 mL - 12/17/2024 10:41:00 AM  bupivacaine liposomal (EXPAREL) 1.3 % injection 20 mL - Perineural   20 mL - 12/17/2024 10:41:00 AM  fentanyl citrate (PF) 100 MCG/2ML 50 mcg - Intravenous   50 mcg - 12/17/2024 10:41:00 AM  midazolam (VERSED) injection 0.5 mg - Intravenous   2 mg - 12/17/2024 10:41:00 AM (lido 2% 0.7 ml to skin)  Needle  Needle type: Stimuplex   Needle gauge: 22 G  Needle length: 2 in  Needle localization: anatomical landmarks and ultrasound guidance  Assessment  Injection assessment: incremental injection, frequent aspiration, injected with ease, negative aspiration, negative for heart rate change, no paresthesia on injection, no symptoms of intraneural/intravenous injection and needle tip visualized at all times  Paresthesia pain: none  Post-procedure:  site cleaned  patient tolerated the procedure well with no immediate complications  Additional Notes  US imaged saved.  Exparel bracelet applied.  Excellent visualization and spread; small artery identified  superior and lateral to nerve, avoided without difficulty

## 2024-12-17 NOTE — DISCHARGE INSTR - AVS FIRST PAGE
Dr. Aponte Rotator Cuff Repair     What to Expect/Activity     It is normal to have some discomfort in your shoulder for several days.     For the next 48 to 72 hours use?ice around the shoulder to help reduce the pain and swelling for 20-30 minutes every 1-2 hours while you are awake.     Place a pillow underneath your elbow when sitting to help reduce pressure on the shoulder.     Sleeping in an upright position for the first two days will help reduce swelling,?as well.?     You are non-weight bearing to your operative arm. Wear your sling at all times.     No pendulum exercises or active movement of shoulder joint.     We encourage finger, wrist, and elbow range of motion.?     Do not drive until instructed by Dr. Aponte.     Dressing/Wound Care/Bathing     You may remove your dressing 3 days after surgery. Leave any steri-strips in place.     You may shower 3 days after surgery.?After showers, pat incisions and cover with band-aids. Change band-aids after showering.     No baths, swimming or submerging until discussed at your follow-up appointment.?     ?Pain Management/Medications     You may resume your usual medications.     You have been provided a prescription for narcotic pain medication. This is to be used only as needed for breakthrough pain that is not controlled with over-the-counter pain medication.     Please take the following medications:     Take Aspirin 81 mg twice daily for 30 days (blood clot prevention)     Tylenol 1,00 mg every 6-8 hours for 14 days as needed    While taking your narcotic medication, an over-the-counter stool softener may be helpful to prevent constipation. Please consider taking Obdulia-Colace twice daily while taking this medication.     Nerve Block: If you received a nerve block today your surgical limb may feel numb with loss of muscle control for up to 18-24 hours after surgery. We recommend taking a dose of pain medication prior to bed tonight, to cover any  pain that may occur if your nerve block begins to wear off while you are sleeping.     ?Follow up/Call if:     The findings of your surgery will be explained to you and your family immediately after surgery. However, in the post-operative period, during recovery from anesthesia you may not fully remember or fully understand what was said. This will be explained once again when you return for your post-op appointment and suture removal.     If you were provided with photos of your procedure, please bring them to your follow up appointment for explanation.     Please contact Dr. Aponte’s office if you experience the following:     Excessive bleeding (bleeding through your dressing)     Fever greater than 101 degrees F     Persistent nausea or vomiting     Decreased sensation or discoloration of the operative limb     Pain or swelling that is getting worse and not better with medication

## 2024-12-17 NOTE — OP NOTE
OPERATIVE REPORT  PATIENT NAME: Rayne Irizarry    :  1980  MRN: [id number]  Pt Location:  OR ROOM 03    SURGERY DATE: 2024    Surgeons and Role:     * Rene Aponte,  - Primary     * Prabha Su PA-C - Assisting    Preop Diagnosis:  Tear of left supraspinatus tendon [M75.102]    Post-Op Diagnosis Codes:     * Tear of left supraspinatus tendon [M75.102]    Procedure(s):  Left - REPAIR ROTATOR CUFF ARTHROSCOPIC  Left - ARTHROSCOPIC SUBACROMIAL DECOMPRESSION    Specimen(s):  * No specimens in log *    Estimated Blood Loss:   Minimal    Drains:  * No LDAs found *    Anesthesia Type:   General w/ Regional    Operative Indications:  Tear of left supraspinatus tendon [M75.102]    Operative Findings:  As expected      Complications:   None    Procedure and Technique:  Patient was seen examined in the preoperative holding area.  Patient's identity was confirmed with the wrist band and hospital chart. The operative extremity was marked in the laterality was confirmed and signed by both the patient and myself matching the consent.    Patient was brought to the operating room where there placed supine in a beach chair position. Anesthesia was induced.  All bony prominences were padded.  A wedge was placed below the legs.  Patient was positioned upright in a beach chair position using the foam head positioner in a neutral cervical alignment.  The well arm was placed on a padded arm lopez.     The operative arm was evaluated under anesthesia and demonstrated  full passive range of motion    Shoulder was sterilely prepped and draped.  A formal time-out was performed.  Standard posterior portal was used and examination of the intraarticular portion of joint revealed patient had  grade 1 changes to the humeral head and grade 1 changes to the glenoid.  Anterior incision made using a spinal needle via outside in technique. We debrided the superior labral anterior, posterior back to a stable rim. We did a synovectomy  within the joint. The biceps tendon was examined and found to be intact. Subscap intact.  Evaluation of the undersurface of the rotator cuff demonstrated full thickness anterior tear. Shoulder was copiously irrigated and drained.     We went to the subacromial space, made a standard lateral incision. We did an extensive bursectomy , removed the soft tissue from the undersurface of the acromion.   There was a type 3 acromion. Then viewing laterally, working posteriorly, using the posterior aspect of the acromion as a cutting block, we turned the type 3 acromion into a type 1 acromion.    Evaluation of the rotator cuff showed a <1cm full-thickness defect on the tuberosity. We got a good bleeding base on that. We then removed the soft tissue in that area.     We placed an Arthrex 2.6mm Fibertak anchor with triple loaded suturetape. We passed sutures in mattress fashion with a scorpion. We then tied from anterior to posterior with alternating half hitch sliding knots. This completely restored the rotator cuff over the footprint. We then cut the tails and probed the tear to confirm a stable repair with appropriate tension free compression at the footprint.    The patient tolerated this very well. The shoulder was copiously irrigated and drained. The wounds were closed with 3-0 monocryl interrupted sutures. A sterile dressing was placed on the shoulder. The patient had good capillary refill. The patient was awoken in the operating room and brought to recovery room in stable condition in an abduction splint.  There transported to the PACU in stable condition.     I was present for the entire procedure., A qualified resident physician was not available., and A physician assistant was required during the procedure for retraction, tissue handling, dissection and suturing.    Patient Disposition:  PACU              SIGNATURE: Rene Aponte DO  DATE: December 17, 2024  TIME: 1:48 PM

## 2024-12-17 NOTE — ANESTHESIA PREPROCEDURE EVALUATION
Procedure:  REPAIR ROTATOR CUFF  ARTHROSCOPIC (Left: Shoulder)    Relevant Problems   ANESTHESIA (within normal limits)      CARDIO (within normal limits)  Elevated BP preop      ENDO   (+) Hypothyroidism      GI/HEPATIC   (+) GERD (gastroesophageal reflux disease)      PULMONARY (within normal limits)   (-) Sleep apnea   (-) Smoking   (-) URI (upper respiratory infection)      Physical Exam    Airway    Mallampati score: I  TM Distance: >3 FB  Neck ROM: full     Dental   No notable dental hx     Cardiovascular      Pulmonary      Other Findings      Lab Results   Component Value Date    WBC 5.52 05/31/2024    HGB 16.8 05/31/2024     05/31/2024     Lab Results   Component Value Date    SODIUM 138 05/31/2024    K 4.0 05/31/2024    BUN 9 05/31/2024    CREATININE 0.98 05/31/2024    EGFR 92 05/31/2024     Lab Results   Component Value Date    HGBA1C 5.5 04/26/2024     Anesthesia Plan  ASA Score- 2     Anesthesia Type- general with ASA Monitors.         Additional Monitors:     Airway Plan:     Comment: IS block with exparel - expectations and side effects reviewed.       Plan Factors-Exercise tolerance (METS): >4 METS.    Chart reviewed.   Existing labs reviewed. Patient summary reviewed.    Patient is not a current smoker.              Induction- intravenous.    Postoperative Plan-         Informed Consent- Anesthetic plan and risks discussed with patient and spouse.  I personally reviewed this patient with the CRNA. Discussed and agreed on the Anesthesia Plan with the CRNA..

## 2024-12-17 NOTE — ANESTHESIA POSTPROCEDURE EVALUATION
Post-Op Assessment Note    CV Status:  Stable    Pain management: adequate       Mental Status:  Alert and awake   Hydration Status:  Euvolemic   PONV Controlled:  Controlled   Airway Patency:  Patent     Post Op Vitals Reviewed: Yes    No anethesia notable event occurred.    Staff: Anesthesiologist           Last Filed PACU Vitals:  Vitals Value Taken Time   Temp 97.6 °F (36.4 °C) 12/17/24 1352   Pulse 96 12/17/24 1524   /71 12/17/24 1520   Resp 16 12/17/24 1524   SpO2 99 % 12/17/24 1524   Vitals shown include unfiled device data.    Modified Sherry:  Activity: 2 (12/17/2024  2:22 PM)  Respiration: 2 (12/17/2024  2:22 PM)  Circulation: 2 (12/17/2024  2:22 PM)  Consciousness: 2 (12/17/2024  2:22 PM)  Oxygen Saturation: 2 (12/17/2024  2:22 PM)  Modified Sherry Score: 10 (12/17/2024  2:22 PM)

## 2024-12-17 NOTE — H&P
[facility] Sports Medicine Shoulder Follow Up Visit     Assesment:   44 y.o. male left shoulder rotator cuff tear and right shoulder pain     Plan:The patient's diagnosis and treatment were discussed at length today. We discussed no treatment, non-operative treatment, and operative treatment.     Rayne presents today for left shoulder MRI review. After reviewing the images, I recommended surgical intervention to address the left shoulder full thickness rotator cuff tear.  He has previously attempted physical therapy with worsening of his pain.  He has ongoing dysfunction I explained that small full-thickness tears will progress over time and would recommend addressing this acutely given his young age, activity level, and risks of healing if toe becomes chronic.  We discussed that without surgical intervention, the tear would likely worsen over time and significantly retract. The patient is agreeable and would like to proceed with Arthroscopic rotator cuff repair of the left shoulder. I mentioned concerns for the ongoing right shoulder pain, and recommended he get an MRI for further evaluation to rule out a rotator cuff tear of the right shoulder. He will follow up for MRI review once images are received.     Conservative treatment:     Ice to shoulder 1-2 times daily, for 20 minutes at a time.        Imaging:     All imaging from today was reviewed by myself and explained to the patient.         Injection:     No Injection planned at this time.       Surgery:     All of the risks and benefits of operative treatment were explained to the patient, as well as the risks and benefits of any alternative treatment options, including nonoperative care. This risks of surgery include, but are not limited to, infection, bleeding, blood clot, damage to nerves/arteries, need for further surgyer, cardiovascular risk, anesthesia risk, and continued pain.  The patient understood this and elects to proceed forward with surgical  intervention.  We will proceed forward with surgical arthroscopy of the shoulder with left shoulder rotator cuff repair         Follow up:     No follow-ups on file.             Chief Complaint   Patient presents with    Left Shoulder - Pain       MRI done 10/21/24             History of Present Illness:     The patient is returns for follow up for left shoulder MRI review.  Since the prior visit, He reports no improvement. He notes pain with overhead activity and reaching away from the body. He reports a tingling sensation in the scapular region with activity. He was compliant with multiple outpatient PT visits, but has since discontinued due to increased pain. His current pain is managed with rest, OTC NSAID's and ice as needed.           Shoulder Surgical History:  None     Past Medical, Social and Family History:  Medical History        Past Medical History:   Diagnosis Date    Disease of thyroid gland           Surgical History         Past Surgical History:   Procedure Laterality Date    EGD AND COLONOSCOPY   11/10/2023    LIPOMA RESECTION Bilateral 2013         Allergies         Allergies   Allergen Reactions    Pollen Extract Allergic Rhinitis and Shortness Of Breath       Other reaction(s): Sneezing  Other reaction(s): Sneezing            Medications Ordered Prior to Encounter          Current Outpatient Medications on File Prior to Visit   Medication Sig Dispense Refill    levothyroxine 75 mcg tablet Take 1 tablet (75 mcg total) by mouth daily 90 tablet 1    Multiple Vitamin (Multivitamin Adult) TABS Take 1 tablet by mouth in the morning        omeprazole (PriLOSEC) 40 MG capsule Take 1 capsule (40 mg total) by mouth daily 90 capsule 3      No current facility-administered medications on file prior to visit.         Social History               Socioeconomic History    Marital status: /Civil Union       Spouse name: Not on file    Number of children: Not on file    Years of education: Not on file     "Highest education level: Not on file   Occupational History    Not on file   Tobacco Use    Smoking status: Never       Passive exposure: Never    Smokeless tobacco: Never   Vaping Use    Vaping status: Never Used   Substance and Sexual Activity    Alcohol use: Not Currently       Alcohol/week: 2.0 standard drinks of alcohol       Comment: 2 drinks/month    Drug use: Never    Sexual activity: Yes       Partners: Female       Birth control/protection: Condom Male   Other Topics Concern    Not on file   Social History Narrative    Not on file      Social Drivers of Health      Financial Resource Strain: Not on file   Food Insecurity: Not on file   Transportation Needs: Not on file   Physical Activity: Not on file   Stress: Not on file   Social Connections: Not on file   Intimate Partner Violence: Not on file   Housing Stability: Not on file            I have reviewed the past medical, surgical, social and family history, medications and allergies as documented in the EMR.    Review of systems: ROS is negative other than that noted in the HPI.  Constitutional: Negative for fatigue and fever.       Physical Exam:     Blood pressure 120/81, pulse 74, height 5' 8.5\" (1.74 m), weight 87.1 kg (192 lb).     General/Constitutional: NAD, well developed, well nourished  HENT: Normocephalic, atraumatic  CV: Intact distal pulses, regular rate  Resp: No respiratory distress or labored breathing  Lymphatic: No lymphadenopathy palpated  Neuro: Alert and Oriented x 3, no focal deficits  Psych: Normal mood, normal affect, normal judgement, normal behavior  Skin: Warm, dry, no rashes, no erythema        Shoulder focused exam:        Visual inspection of the left shoulder demonstrates normal contour without atrophy  No evidence of scapular dyskinesia or atrophy.  No scapular winging  Active and passive range of motion demonstrates forward flexion to 180, abduction to 120, external rotation is approximately 90 with the arm in 90° of " abduction, external rotation is 80 with the arm the side, internal rotation to T8.   Rotator cuff strength is 4/5 to resisted forward flexion on the left, 4+/5 resisted abduction on the left, 4/5 resisted external rotation, 5/5 resisted internal rotation  No tenderness to palpation at the distal clavicle, AC joint, acromion, or scapular spine  No pain with cross-body adduction  + Neer's test, - modified Torres impingement test  Negative external rotation lag sign  Negative belly press, negative lift-off  + speed's   + tenderness to palpation at the bicipital groove        Visual inspection of the right shoulder demonstrates normal contour without atrophy  No scapular dyskinesia  Active range of motion demonstrates forward flexion 180, abduction 120, external rotation at 90 is 90, external rotation with the arm at side is 75, internal Tatian to the lower thoracic  Rotator cuff strength is 4/5 to resisted forward flexion on the left, 4/5 resisted abduction on the left, 4+/5 resisted external rotation, 5/5 resisted internal rotation  - Neer's and - Torres  Negative belly press, negative lift-off  + speeds     UE NV Exam: +2 Radial pulses bilaterally  Sensation intact to light touch C5-T1 bilaterally, Radial/median/ulnar nerve motor intact     Cervical ROM is full without pain, numbness or tingling        Shoulder Imaging     No imaging was performed today        Scribe Attestation       I,:  Jamin Lopez am acting as a scribe while in the presence of the attending physician.:       I,:  Rene Aponte DO personally performed the services described in this documentation    as scribed in my presence.:

## 2024-12-17 NOTE — INTERVAL H&P NOTE
H&P reviewed. After examining the patient I find no changes in the patients condition since the H&P had been written.    Vitals:    12/17/24 1043   BP: 128/75   Pulse: 64   Resp: 16   Temp:    SpO2: 99%

## 2024-12-17 NOTE — ANESTHESIA POSTPROCEDURE EVALUATION
Post-Op Assessment Note    CV Status:  Stable  Pain Score: 0    Pain management: adequate       Mental Status:  Alert and awake   Hydration Status:  Euvolemic   PONV Controlled:  Controlled   Airway Patency:  Patent     Post Op Vitals Reviewed: Yes    No anethesia notable event occurred.    Staff: CRNA           Last Filed PACU Vitals:  Vitals Value Taken Time   Temp 97.6 °F (36.4 °C) 12/17/24 1352   Pulse 94 12/17/24 1353   /60 12/17/24 1352   Resp 17 12/17/24 1353   SpO2 97 % 12/17/24 1353   Vitals shown include unfiled device data.    Modified Sherry:  No data recorded

## 2024-12-23 ENCOUNTER — EVALUATION (OUTPATIENT)
Dept: PHYSICAL THERAPY | Facility: CLINIC | Age: 44
End: 2024-12-23
Payer: COMMERCIAL

## 2024-12-23 DIAGNOSIS — Z98.890 S/P ARTHROSCOPY OF LEFT SHOULDER: Primary | ICD-10-CM

## 2024-12-23 DIAGNOSIS — M75.122 COMPLETE TEAR OF LEFT ROTATOR CUFF, UNSPECIFIED WHETHER TRAUMATIC: ICD-10-CM

## 2024-12-23 PROCEDURE — 97161 PT EVAL LOW COMPLEX 20 MIN: CPT | Performed by: PHYSICAL THERAPIST

## 2024-12-23 PROCEDURE — 97140 MANUAL THERAPY 1/> REGIONS: CPT | Performed by: PHYSICAL THERAPIST

## 2024-12-23 NOTE — PROGRESS NOTES
PT Evaluation     Today's date: 2024  Patient name: Rayne Irizarry  : 1980  MRN: [id number]  Referring provider: Prabha Su PA-C  Dx:   Encounter Diagnosis     ICD-10-CM    1. S/P arthroscopy of left shoulder  Z98.890 Ambulatory Referral to Physical Therapy      2. Complete tear of left rotator cuff, unspecified whether traumatic  M75.122                  Assessment  Impairments: abnormal or restricted ROM, activity intolerance, impaired physical strength, lacks appropriate home exercise program, pain with function and poor posture   Symptom irritability: moderate    Assessment details: Rayne Irizarry is a 44 y.o. male presenting to outpatient physical therapy at [facility] with complaints of L shoulder pain.  He presents with decreased L shoulder range of motion, decreased L UE strength, limited flexibility, poor postural awareness, decreased tolerance to activity and decreased functional mobility following L RTC repair on 24.  His PROM for les than 1 week post-op is very good.  He would benefit from skilled PT services in order to address these deficits and reach maximum level of function.  Thank you for the referral!        Understanding of Dx/Px/POC: excellent     Prognosis: excellent    Goals  ST.  Independent with HEP in 2 weeks  2.  Increase L shoulder PROM to within 20 degrees of WNL all motions in 3 weeks   3.  Good postural awareness in 2 weeks    LT.  Achieve FOTO score of 59/100 in 12 weeks   2.  Able to lift up to 5# to head level with L UE and complete all L UE tasks in 12 weeks  3.  Strength all L UE motions = 4/5 in 12 weeks      Plan  Patient would benefit from: skilled physical therapy  Planned modality interventions: cryotherapy, thermotherapy: hydrocollator packs and TENS    Planned therapy interventions: home exercise program, functional ROM exercises, joint mobilization, manual therapy, neuromuscular re-education, therapeutic exercise, therapeutic activities, stretching,  strengthening, flexibility and postural training    Frequency: 2x week  Duration in weeks: 12  Treatment plan discussed with: patient        Subjective Evaluation    History of Present Illness  Mechanism of injury: Pt reports chronic B shoulder pain of insidious onset.  Notes L shoulder pain may have been brought on after bumping into a window/window sill a few years ago.  Tried PT earlier this year without pain relief.  He had a L RTC repair on 24 and has been using a sling since that time.  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            Recurrent probem    Quality of life: good    Patient Goals  Patient goals for therapy: decreased pain, increased motion, increased strength, independence with ADLs/IADLs and return to sport/leisure activities    Pain  Current pain ratin  At best pain ratin  At worst pain ratin  Quality: dull ache, tight and sharp  Relieving factors: ice and medications  Aggravating factors: overhead activity and lifting  Progression: no change    Social Support  Lives with: spouse    Employment status: working  Hand dominance: right    Treatments  Previous treatment: physical therapy, medication and immobilization  Current treatment: immobilization and medication        Objective     Static Posture     Shoulders  Rounded.    Postural Observations  Seated posture: fair  Standing posture: good  Correction of posture: makes symptoms better      Neurological Testing     Sensation     Shoulder   Left Shoulder   Intact: light touch    Right Shoulder   Intact: Light touch    Active Range of Motion   Cervical/Thoracic Spine       Cervical    Flexion: 50 degrees   Extension: 47 degrees      Left rotation: 66 degrees  Right rotation: 70 degrees       Thoracic    Right lateral flexion:  Restriction level: minimal  Left rotation:  Restriction level: minimal  Right rotation:  Restriction level: minimal    Right Shoulder   Normal active range of motion    Additional Active  Range of Motion Details  L shoulder AROM N/T at IE on 12/23/24 due to recent surgery.     Passive Range of Motion   Left Shoulder   Flexion: 95 degrees   Extension: 25 degrees   Abduction: 85 degrees   External rotation 0°: 25 degrees   Internal rotation 45°: 65 degrees     Right Shoulder   Normal passive range of motion    Strength/Myotome Testing     Left Shoulder     Planes of Motion   Left shoulder abduction strength: pain.   Left shoulder external rotation strength at 0 degrees: pain.   Left shoulder horizontal abduction strength: pain.     Isolated Muscles   Lower trapezius strength: pain.     Right Shoulder     Planes of Motion   Flexion: 4+   Extension: 5   Abduction: 4+   External rotation at 0°: 5   Internal rotation at 0°: 5     Additional Strength Details  L UE strength N/T at IE on 12/23/24 due to recent surgery.        Precautions:  Sling until 1/28/25  CO-MORBIDITIES:  L RTC repair 12/17/24  HEP ACCESS CODE:   FOTO Completed On:     POC Expires Reeval for Medicare to be completed Unit LImit Auth Expiration Date PT/OT/STVisit Limit   3/17/25 By visit  N/A N/A N/A BOMN                     TREATMENT DIARY  Auth Status DATE 12/23        NA Visit # 1         Remain N/A        MANUAL THERAPY         PROM L shoulder 20'                                                     THERAPEUTIC EXERCISE HEP         AROM L elbow flex/ext 12/23 10        AROM L forearm sup/pron 12/23 10        AROM L wrist flex/ext 12/23 10        L hand gripping 12/23 10                                                                                                                      NEUROMUSCULAR REEDUCATION                                                                                                                                                       THERAPEUTIC ACTIVITY                                                  GAIT TRAINING                                                  MODALITIES         CP L shoulder sitting 10'

## 2024-12-26 ENCOUNTER — OFFICE VISIT (OUTPATIENT)
Facility: CLINIC | Age: 44
End: 2024-12-26
Payer: COMMERCIAL

## 2024-12-26 ENCOUNTER — OFFICE VISIT (OUTPATIENT)
Dept: OBGYN CLINIC | Facility: CLINIC | Age: 44
End: 2024-12-26

## 2024-12-26 VITALS — HEIGHT: 68 IN | WEIGHT: 195 LBS | BODY MASS INDEX: 29.55 KG/M2

## 2024-12-26 DIAGNOSIS — M75.122 COMPLETE TEAR OF LEFT ROTATOR CUFF, UNSPECIFIED WHETHER TRAUMATIC: ICD-10-CM

## 2024-12-26 DIAGNOSIS — Z98.890 S/P ARTHROSCOPY OF LEFT SHOULDER: Primary | ICD-10-CM

## 2024-12-26 DIAGNOSIS — M75.81 TENDINITIS OF RIGHT ROTATOR CUFF: Primary | ICD-10-CM

## 2024-12-26 PROCEDURE — 99024 POSTOP FOLLOW-UP VISIT: CPT | Performed by: STUDENT IN AN ORGANIZED HEALTH CARE EDUCATION/TRAINING PROGRAM

## 2024-12-26 PROCEDURE — 97140 MANUAL THERAPY 1/> REGIONS: CPT

## 2024-12-26 PROCEDURE — 97110 THERAPEUTIC EXERCISES: CPT

## 2024-12-26 NOTE — PROGRESS NOTES
Shoulder Post Operative Visit     Assesment:     44 y.o. male 9 days s/p Surgical Arthroscopy of the left shoulder with rotator cuff repair and subacromial decompression, DOS: 12/17/2024    Plan:    Rayne presents today 9 days s/p left shoulder arthroscopy with rotator cuff repair and subacromial decompression, DOS: 12/17/2024. He states he is doing well overall at this time. Patient should continue with physical therapy according to protocol. Sutures were removed at today's visit and new steri-strips placed. Patient can allow the steri-strips to fall off over the next week and does not need to replace them. Patient can shower and let water run over the incisions, but should not submerge them in water until the incisions are healed. Patient can come out of the sling a couple times a day when at rest. Patient will follow up in 4 weeks.    Post-Operative treatment:    PT for ROM and strengthening to shoulder, rotator cuff, scapular stabilizers.  OTC medication as needed for pain.    Imaging:    No imaging was available for review today.    Sling:  at all times other than showering    DVT Prophylaxis:  Ambulation    Follow up:   4 weeks    Patient was advised that if they have any fevers, chills, chest pain, shortness of breath, redness or drainage from the incision, please let our office know immediately.        Chief Complaint   Patient presents with    Left Shoulder - Post-op     Sx 12/17/24        History of Present Illness:    The patient is a 44 y.o. male who is being evaluated post operatively 9 days status post left shoulder arthroscopy with rotator cuff repair and subacromial decompression, DOS: 12/17/2024. Since the prior visit, He reports significant improvement. He reports his pain is well controlled with over-the-counter medications. He has been compliant with use of his sling. He did remove the pillow due to discomfort. He started physical therapy on 12/23/2024. He is overall happy with the results of the  "surgery at this time.      The patient denies any fevers, chills, calf pain, chest pain/shortness of breath, redness or drainage from the incision.         I have reviewed the past medical, surgical, social and family history, medications and allergies as documented in the EMR.    Review of systems: ROS is negative other than that noted in the HPI.  Constitutional: Negative for fatigue and fever.       Physical Exam:    Height 5' 8\" (1.727 m), weight 88.5 kg (195 lb).    General/Constitutional: NAD, well developed, well nourished  HENT: Normocephalic, atraumatic  CV: Intact distal pulses, regular rate  Resp: No respiratory distress or labored breathing  Lymphatic: No lymphadenopathy palpated  Neuro: Alert and Oriented x 3, no focal deficits  Psych: Normal mood, normal affect, normal judgement, normal behavior  Skin: Warm, dry, no rashes, no erythema    Shoulder focused exam:        Visual inspection of the shoulder demonstrates normal contour without atrophy  Incisions clean, dry and intact with no signs of infection  No evidence of scapular dyskinesia or atrophy.  No scapular winging  ROM, Strength not tested due 9 days post-op    Incisions show no erythema, no drainage      UE NV Exam: +2 Radial pulses bilaterally  Sensation intact to light touch C5-T1 bilaterally, Radial/median/ulnar nerve motor intact      Scribe Attestation      I,:  Will Gray am acting as a scribe while in the presence of the attending physician.:       I,:  Rene Aponte, DO personally performed the services described in this documentation    as scribed in my presence.:                  "

## 2024-12-26 NOTE — PROGRESS NOTES
"Daily Note     Today's date: 2024  Patient name: Rayne Irizarry  : 1980  MRN: [id number]  Referring provider: Prabha Su PA-C  Dx:   Encounter Diagnosis     ICD-10-CM    1. S/P arthroscopy of left shoulder  Z98.890       2. Complete tear of left rotator cuff, unspecified whether traumatic  M75.122                      Subjective: Patient noted no new complaints.       Objective: See treatment diary below      Assessment: Tolerated treatment fair. Patient exhibited good technique with therapeutic exercises and would benefit from continued PT. Patient was able to add in scap retraction today with no complaints. PROM performed as per protocol limits.        Plan: Continue per plan of care.      Precautions:  Sling until 25  CO-MORBIDITIES:  L RTC repair 24 PROTOCOL ATTACHED BELOW  HEP ACCESS CODE:   FOTO Completed On:     POC Expires Reeval for Medicare to be completed Unit LImit Auth Expiration Date PT/OT/STVisit Limit   3/17/25 By visit  N/A N/A N/A BOMN                     TREATMENT DIARY  Auth Status DATE        NA Visit # 1 2        Remain N/A        MANUAL THERAPY         PROM L shoulder 20' 20'                                                    THERAPEUTIC EXERCISE HEP         AROM L elbow flex/ext  10 2 x 10       AROM L forearm sup/pron  10 2 x 10       AROM L wrist flex/ext  10 2 x 10       L hand gripping  10 2 x 10        Scap retractions   5\" x 10                                                                                                           NEUROMUSCULAR REEDUCATION                                                                                                                                                       THERAPEUTIC ACTIVITY                                                  GAIT TRAINING                                                  MODALITIES         CP L shoulder sitting 10' 10'                                        "

## 2024-12-30 ENCOUNTER — OFFICE VISIT (OUTPATIENT)
Dept: PHYSICAL THERAPY | Facility: CLINIC | Age: 44
End: 2024-12-30
Payer: COMMERCIAL

## 2024-12-30 DIAGNOSIS — Z98.890 S/P ARTHROSCOPY OF LEFT SHOULDER: Primary | ICD-10-CM

## 2024-12-30 DIAGNOSIS — M75.122 COMPLETE TEAR OF LEFT ROTATOR CUFF, UNSPECIFIED WHETHER TRAUMATIC: ICD-10-CM

## 2024-12-30 PROCEDURE — 97140 MANUAL THERAPY 1/> REGIONS: CPT | Performed by: PHYSICAL THERAPIST

## 2024-12-30 NOTE — PROGRESS NOTES
"Daily Note     Today's date: 2024  Patient name: Rayne Irizarry  : 1980  MRN: [id number]  Referring provider: Prabha Su PA-C  Dx:   Encounter Diagnosis     ICD-10-CM    1. S/P arthroscopy of left shoulder  Z98.890       2. Complete tear of left rotator cuff, unspecified whether traumatic  M75.122                  Subjective:  Pt reports feeling a little less pain this week.  Felt good after last PT session.  R shoulder is sore too.  MD added an order for that shoulder too.       Objective:  See treatment diary below      Assessment:  Pt presented to physical therapy with complaints of L shoulder pain.  He presents with decreased L shoulder range of motion, decreased L UE strength, limited flexibility, poor postural awareness, decreased tolerance to activity and decreased functional mobility following L RTC repair on 24.  His PROM for less than 2 weeks post-op is very good to about 80% of full PROM already.  He will continue to benefit from skilled PT services in order to address these deficits and reach maximum level of function.      Plan:  Add light R shoulder PRE's for postural and RTC strengthening.        Precautions:  Sling until 25  CO-MORBIDITIES:  L RTC repair 24 PROTOCOL ATTACHED BELOW  HEP ACCESS CODE:   FOTO Completed On:     POC Expires Reeval for Medicare to be completed Unit LImit Auth Expiration Date PT/OT/STVisit Limit   3/17/25 By visit  N/A N/A N/A BOMN                     TREATMENT DIARY  Auth Status DATE       NA Visit # 1 2 3       Remain N/A        MANUAL THERAPY         PROM L shoulder 20' 20' 20'                                                   THERAPEUTIC EXERCISE HEP         AROM L elbow flex/ext  10 2 x 10 20      AROM L forearm sup/pron  10 2 x 10 20      AROM L wrist flex/ext  10 2 x 10 20      L hand gripping  10 2 x 10  Blue digiflex 20      Scap retractions   5\" x 10 10                                                 "                                                          NEUROMUSCULAR REEDUCATION                                                                                                                                                       THERAPEUTIC ACTIVITY                                                  GAIT TRAINING                                                  MODALITIES         CP L shoulder sitting 10' 10'

## 2025-01-02 ENCOUNTER — OFFICE VISIT (OUTPATIENT)
Dept: PHYSICAL THERAPY | Facility: CLINIC | Age: 45
End: 2025-01-02
Payer: COMMERCIAL

## 2025-01-02 DIAGNOSIS — Z98.890 S/P ARTHROSCOPY OF LEFT SHOULDER: Primary | ICD-10-CM

## 2025-01-02 PROCEDURE — 97110 THERAPEUTIC EXERCISES: CPT

## 2025-01-02 PROCEDURE — 97140 MANUAL THERAPY 1/> REGIONS: CPT

## 2025-01-02 NOTE — PROGRESS NOTES
"Daily Note     Today's date: 2025  Patient name: Rayne Irizarry  : 1980  MRN: [id number]  Referring provider: Prabha Su PA-C  Dx:   Encounter Diagnosis     ICD-10-CM    1. S/P arthroscopy of left shoulder  Z98.890                  Subjective:  Pt reports he has been doing well. Minimal pain at this time. Still wearing his sling all day at this time.     Objective:  See treatment diary below      Assessment:  Pt presented to physical therapy with complaints of L shoulder pain.  He presents with decreased L shoulder range of motion, decreased L UE strength, limited flexibility, poor postural awareness, decreased tolerance to activity and decreased functional mobility following L RTC repair on 24.  Pt PROM continues to move very well with minimal discomfort. Most of his discomfort comes when bring arm back to a resting position after having stretched for several minutes.       Plan:  Add light R shoulder PRE's for postural and RTC strengthening.        Precautions:  Sling until 25  CO-MORBIDITIES:  L RTC repair 24 PROTOCOL ATTACHED BELOW  HEP ACCESS CODE:   FOTO Completed On:     POC Expires Reeval for Medicare to be completed Unit LImit Auth Expiration Date PT/OT/STVisit Limit   3/17/25 By visit  N/A N/A N/A BOMN                     TREATMENT DIARY  Auth Status DATE      NA Visit # 1 2 3 4      Remain N/A        MANUAL THERAPY         PROM L shoulder 20' 20' 20' 20'                                                  THERAPEUTIC EXERCISE HEP         AROM L elbow flex/ext  10 2 x 10 20 20     AROM L forearm sup/pron  10 2 x 10 20 20     AROM L wrist flex/ext  10 2 x 10 20 20     L hand gripping  10 2 x 10  Blue digiflex 20 Blue digiflex 20     Scap retractions   5\" x 10 10 20                                                                                                         NEUROMUSCULAR REEDUCATION                                                     "                                                                                                   THERAPEUTIC ACTIVITY                                                  GAIT TRAINING                                                  MODALITIES         CP L shoulder sitting 10' 10'

## 2025-01-07 ENCOUNTER — OFFICE VISIT (OUTPATIENT)
Dept: PHYSICAL THERAPY | Facility: CLINIC | Age: 45
End: 2025-01-07
Payer: COMMERCIAL

## 2025-01-07 DIAGNOSIS — Z98.890 S/P ARTHROSCOPY OF LEFT SHOULDER: ICD-10-CM

## 2025-01-07 DIAGNOSIS — M75.122 COMPLETE TEAR OF LEFT ROTATOR CUFF, UNSPECIFIED WHETHER TRAUMATIC: Primary | ICD-10-CM

## 2025-01-07 PROCEDURE — 97140 MANUAL THERAPY 1/> REGIONS: CPT | Performed by: PHYSICAL THERAPIST

## 2025-01-07 PROCEDURE — 97110 THERAPEUTIC EXERCISES: CPT | Performed by: PHYSICAL THERAPIST

## 2025-01-07 NOTE — PROGRESS NOTES
Daily Note     Today's date: 2025  Patient name: Rayne Irizarry  : 1980  MRN: [id number]  Referring provider: Parbha Su PA-C  Dx:   Encounter Diagnosis     ICD-10-CM    1. Complete tear of left rotator cuff, unspecified whether traumatic  M75.122       2. S/P arthroscopy of left shoulder  Z98.890                Subjective:  Pt reports he has been doing well.  Not much pain at all.  R shoulder is a little sore.         Objective:  See treatment diary below.  R shoulder screened today:  AROM and strength = WNL all motions, aside from mid traps = 4/5 and ER = 4+/5.      Assessment:  Pt presented to physical therapy with complaints of L shoulder pain.  He presents with decreased L shoulder range of motion, decreased L UE strength, limited flexibility, poor postural awareness, decreased tolerance to activity and decreased functional mobility following L RTC repair on 24.  PROM L shoulder is within 5-10 degrees of WNL in all motions now with little endrange pain.  He was advised to continue to be compliant with sling use for 2 more weeks.  He will continue to benefit from skilled PT to maximize function of L UE.  R shoulder needs some postural strengthening.       Plan:  Continue PROM focus until 6 weeks post-op.  To MD on 25.  Add light weights to R shoulder PREs.       Precautions:  Sling until 25  CO-MORBIDITIES:  L RTC repair 24 PROTOCOL ATTACHED BELOW  HEP ACCESS CODE:   FOTO Completed On:     POC Expires Reeval for Medicare to be completed Unit LImit Auth Expiration Date PT/OT/STVisit Limit   3/17/25 By visit  N/A N/A N/A BOMN                     TREATMENT DIARY  Auth Status DATE     NA Visit # 1 2 3 4 5     Remain N/A        MANUAL THERAPY         PROM L shoulder 20' 20' 20' 20' 20'                                                 THERAPEUTIC EXERCISE HEP         AROM L elbow flex/ext  10 2 x 10 20 20 20    AROM L forearm sup/pron  10 2 x 10 20 20  "20    AROM L wrist flex/ext 12/23 10 2 x 10 20 20 20    L hand gripping 12/23 10 2 x 10  Blue digiflex 20 Blue digiflex 20 Blue digiflex 30    Scap retractions   5\" x 10 10 20 20    Pendulums L CW/CCW 1/7     20 ea    Bent over mid trap R 1/7     10    Bent over rows R 1/7     10    TB rows R 1/7     Blue 20    TB R shoulder ER 1/7     Blue 15                                                      NEUROMUSCULAR REEDUCATION                                                                                                                                                       THERAPEUTIC ACTIVITY                                                  GAIT TRAINING                                                  MODALITIES         CP L shoulder sitting 10' 10'                                        "

## 2025-01-09 ENCOUNTER — OFFICE VISIT (OUTPATIENT)
Dept: PHYSICAL THERAPY | Facility: CLINIC | Age: 45
End: 2025-01-09
Payer: COMMERCIAL

## 2025-01-09 DIAGNOSIS — Z98.890 S/P ARTHROSCOPY OF LEFT SHOULDER: ICD-10-CM

## 2025-01-09 DIAGNOSIS — M75.122 COMPLETE TEAR OF LEFT ROTATOR CUFF, UNSPECIFIED WHETHER TRAUMATIC: Primary | ICD-10-CM

## 2025-01-09 DIAGNOSIS — S46.011D STRAIN OF MUSCLE(S) AND TENDON(S) OF THE ROTATOR CUFF OF RIGHT SHOULDER, SUBSEQUENT ENCOUNTER: ICD-10-CM

## 2025-01-09 PROCEDURE — 97110 THERAPEUTIC EXERCISES: CPT | Performed by: PHYSICAL THERAPIST

## 2025-01-09 PROCEDURE — 97140 MANUAL THERAPY 1/> REGIONS: CPT | Performed by: PHYSICAL THERAPIST

## 2025-01-09 NOTE — PROGRESS NOTES
Daily Note     Today's date: 2025  Patient name: Rayne Irizarry  : 1980  MRN: [id number]  Referring provider: Prabha Su PA-C  Dx:   Encounter Diagnosis     ICD-10-CM    1. Complete tear of left rotator cuff, unspecified whether traumatic  M75.122       2. S/P arthroscopy of left shoulder  Z98.890       3. Strain of muscle(s) and tendon(s) of the rotator cuff of right shoulder, subsequent encounter  S46.011D                  Subjective:  Pt reports he has been doing well.  Not much pain at all.  R shoulder is a little sore after starting some exercises last session.  Will likely miss 3 weeks of PT while being away.          Objective:  See treatment diary below.        Assessment:  Pt presented to physical therapy with complaints of L shoulder pain.  He presents with decreased L shoulder range of motion, decreased L UE strength, limited flexibility, poor postural awareness, decreased tolerance to activity and decreased functional mobility following L RTC repair on 24.  PROM L shoulder is within 5-10 degrees of WNL in all motions now with little endrange pain.  He was advised to continue to be compliant with sling use for 2 more weeks while he is away in [facility].  He will continue to benefit from skilled PT to maximize function of L UE.      Plan:  Continue PROM focus until 6 weeks post-op.  To MD on 25.  Continue to strengthening R RTC.  Pt to be away up to 3 weeks in [facility] to care for his mother.         Precautions:  Sling until 25  CO-MORBIDITIES:  L RTC repair 24 PROTOCOL ATTACHED BELOW  HEP ACCESS CODE:   FOTO Completed On:     POC Expires Reeval for Medicare to be completed Unit LImit Auth Expiration Date PT/OT/STVisit Limit   3/17/25 By visit  N/A N/A N/A BOMN                     TREATMENT DIARY  Auth Status DATE    NA Visit # 1 2 3 4 5 6    Remain N/A        MANUAL THERAPY         PROM L shoulder 20' 20' 20' 20' 20' 20'                                 "                THERAPEUTIC EXERCISE HEP         AROM L elbow flex/ext 12/23 10 2 x 10 20 20 20 20   AROM L forearm sup/pron 12/23 10 2 x 10 20 20 20 20   AROM L wrist flex/ext 12/23 10 2 x 10 20 20 20 20   L hand gripping 12/23 10 2 x 10  Blue digiflex 20 Blue digiflex 20 Blue digiflex 30 Blue digiflex 30   Scap retractions   5\" x 10 10 20 20 20   Pendulums L CW/CCW 1/7     20 ea 20 ea   Bent over mid trap R 1/7     10 5# 30   Bent over rows R 1/7     10 2# 30   TB rows R 1/7     Blue 20 Blue 30   TB R shoulder ER 1/7     Blue 15 Blue 30                                                     NEUROMUSCULAR REEDUCATION                                                                                                                                                       THERAPEUTIC ACTIVITY                                                  GAIT TRAINING                                                  MODALITIES         CP L shoulder sitting 10' 10'    10'                                    "

## 2025-01-14 ENCOUNTER — APPOINTMENT (OUTPATIENT)
Dept: PHYSICAL THERAPY | Facility: CLINIC | Age: 45
End: 2025-01-14
Payer: COMMERCIAL

## 2025-01-16 ENCOUNTER — APPOINTMENT (OUTPATIENT)
Dept: PHYSICAL THERAPY | Facility: CLINIC | Age: 45
End: 2025-01-16
Payer: COMMERCIAL

## 2025-01-21 ENCOUNTER — APPOINTMENT (OUTPATIENT)
Dept: PHYSICAL THERAPY | Facility: CLINIC | Age: 45
End: 2025-01-21
Payer: COMMERCIAL

## 2025-01-23 ENCOUNTER — APPOINTMENT (OUTPATIENT)
Dept: PHYSICAL THERAPY | Facility: CLINIC | Age: 45
End: 2025-01-23
Payer: COMMERCIAL

## 2025-01-28 ENCOUNTER — APPOINTMENT (OUTPATIENT)
Dept: PHYSICAL THERAPY | Facility: CLINIC | Age: 45
End: 2025-01-28
Payer: COMMERCIAL

## 2025-01-30 ENCOUNTER — APPOINTMENT (OUTPATIENT)
Dept: PHYSICAL THERAPY | Facility: CLINIC | Age: 45
End: 2025-01-30
Payer: COMMERCIAL

## 2025-02-04 ENCOUNTER — APPOINTMENT (OUTPATIENT)
Dept: PHYSICAL THERAPY | Facility: CLINIC | Age: 45
End: 2025-02-04
Payer: COMMERCIAL

## 2025-02-06 ENCOUNTER — APPOINTMENT (OUTPATIENT)
Dept: PHYSICAL THERAPY | Facility: CLINIC | Age: 45
End: 2025-02-06
Payer: COMMERCIAL

## 2025-02-09 DIAGNOSIS — E03.9 ACQUIRED HYPOTHYROIDISM: ICD-10-CM

## 2025-02-11 RX ORDER — LEVOTHYROXINE SODIUM 75 UG/1
75 TABLET ORAL DAILY
Qty: 90 TABLET | Refills: 1 | Status: SHIPPED | OUTPATIENT
Start: 2025-02-11

## 2025-02-13 ENCOUNTER — EVALUATION (OUTPATIENT)
Dept: PHYSICAL THERAPY | Facility: CLINIC | Age: 45
End: 2025-02-13
Payer: COMMERCIAL

## 2025-02-13 DIAGNOSIS — M75.102 LEFT ROTATOR CUFF TEAR ARTHROPATHY: Primary | ICD-10-CM

## 2025-02-13 DIAGNOSIS — M12.812 LEFT ROTATOR CUFF TEAR ARTHROPATHY: Primary | ICD-10-CM

## 2025-02-13 PROCEDURE — 97140 MANUAL THERAPY 1/> REGIONS: CPT | Performed by: PHYSICAL THERAPIST

## 2025-02-13 PROCEDURE — 97110 THERAPEUTIC EXERCISES: CPT | Performed by: PHYSICAL THERAPIST

## 2025-02-13 NOTE — PROGRESS NOTES
Daily Note     Today's date: 2025  Patient name: Rayne Irizarry  : 1980  MRN: [id number]  Referring provider: Prabha Su PA-C  Dx:   Encounter Diagnosis     ICD-10-CM    1. Left rotator cuff tear arthropathy  M75.102     M12.812                Subjective:  Pt reports he has been doing well with only min pain.  He was away fro the past 5 weeks in [place] and did PT there.           Objective:  See treatment diary below.        Assessment:  Pt presented to physical therapy with complaints of L shoulder pain.  He presents with only slightly decreased L shoulder range of motion, decreased L UE strength, limited flexibility, decreased tolerance to activity and decreased functional mobility following L RTC repair on 24.  PROM L shoulder is within 5 degrees of WNL in all motions now with little endrange pain only in flex and ER.  No issues with added exercises today.  He will continue to benefit from skilled PT to maximize function of L UE.      Plan:  Add light strengthening in about 2 weeks with isometrics.  Continue to achieve full L shoulder PROM in 2 weeks.        Precautions:  Sling until 25  CO-MORBIDITIES:  L RTC repair 24 PROTOCOL ATTACHED BELOW - To MD on 3/13/25  HEP ACCESS CODE:   FOTO Completed On:     POC Expires Reeval for Medicare to be completed Unit LImit Auth Expiration Date PT/OT/STVisit Limit   3/17/25 By visit  N/A N/A N/A BOMN                     TREATMENT DIARY  Auth Status DATE  1/   NA Visit # 1 2 3 4 5 6 7    Remain N/A         MANUAL THERAPY          PROM L shoulder 20' 20' 20' 20' 20' 20' 12'                       THERAPEUTIC EXERCISE HEP          AROM L elbow flex/ext  10 2 x 10 20 20 20 20 2# 20   AROM L forearm sup/pron  10 2 x 10 20 20 20 20 D/C   AROM L wrist flex/ext  10 2 x 10 20 20 20 20 D/C   L hand gripping  10 2 x 10  Blue digiflex 20 Blue digiflex 20 Blue digiflex 30 Blue digiflex 30 Black digiflex 30  "  Scap retractions   5\" x 10 10 20 20 20 D/C   Pendulums L CW/CCW 1/7     20 ea 20 ea D/C   Bent over mid trap R 1/7     10 10 D/C   Bent over rows R 1/7     10 10 D/C   Prone rows R           Prone mid traps R           S/L R shoulder ER           Table slides R shoulder flex + scap 2/13       20 ea   Strap behind back stretch into IR R 2/13       10\" 10   Biceps curls R        2# 20   Supine protraction R 2/13       20   Standing cane AAROM L shoulder flex, ext, ER 2/13       10 ea   NEUROMUSCULAR REEDUCATION                                                        THERAPEUTIC ACTIVITY           Flexbar sup/pron        Red 20 ea   Flexbar R wrist flex/ext        Red 20 ea                         GAIT TRAINING                                                       MODALITIES          CP L shoulder sitting 10' 10'    10' D/C                                     "

## 2025-02-14 ENCOUNTER — OFFICE VISIT (OUTPATIENT)
Dept: FAMILY MEDICINE CLINIC | Facility: CLINIC | Age: 45
End: 2025-02-14
Payer: COMMERCIAL

## 2025-02-14 VITALS
BODY MASS INDEX: 29.25 KG/M2 | OXYGEN SATURATION: 97 % | TEMPERATURE: 96.1 F | SYSTOLIC BLOOD PRESSURE: 126 MMHG | HEIGHT: 68 IN | HEART RATE: 66 BPM | WEIGHT: 193 LBS | DIASTOLIC BLOOD PRESSURE: 88 MMHG

## 2025-02-14 DIAGNOSIS — Z00.00 WELLNESS EXAMINATION: ICD-10-CM

## 2025-02-14 DIAGNOSIS — E03.8 OTHER SPECIFIED HYPOTHYROIDISM: Primary | ICD-10-CM

## 2025-02-14 DIAGNOSIS — E74.39 GLUCOSE INTOLERANCE: ICD-10-CM

## 2025-02-14 DIAGNOSIS — R06.2 WHEEZE: ICD-10-CM

## 2025-02-14 DIAGNOSIS — K21.00 GASTROESOPHAGEAL REFLUX DISEASE WITH ESOPHAGITIS WITHOUT HEMORRHAGE: ICD-10-CM

## 2025-02-14 PROCEDURE — 99396 PREV VISIT EST AGE 40-64: CPT | Performed by: FAMILY MEDICINE

## 2025-02-14 PROCEDURE — 99214 OFFICE O/P EST MOD 30 MIN: CPT | Performed by: FAMILY MEDICINE

## 2025-02-14 RX ORDER — ALBUTEROL SULFATE 90 UG/1
2 INHALANT RESPIRATORY (INHALATION) EVERY 6 HOURS PRN
Qty: 8 G | Refills: 5 | Status: SHIPPED | OUTPATIENT
Start: 2025-02-14

## 2025-02-14 RX ORDER — ALBUTEROL SULFATE 90 UG/1
2 INHALANT RESPIRATORY (INHALATION) EVERY 6 HOURS PRN
Qty: 8 G | Refills: 5 | Status: SHIPPED | OUTPATIENT
Start: 2025-02-14 | End: 2025-02-14 | Stop reason: SDUPTHER

## 2025-02-14 NOTE — PROGRESS NOTES
Adult Annual Physical  Name: Rayne Irizarry      : 1980      MRN: [id number]  Encounter Provider: Won Vásquez MD  Encounter Date: 2025   Encounter department: [facility]    Assessment & Plan  Other specified hypothyroidism  Labs pending    Orders:    Hemoglobin A1c (w/out EAG); Future    T4, free; Future    TSH, 3rd generation; Future    Comprehensive metabolic panel; Future    Gastroesophageal reflux disease with esophagitis without hemorrhage  Stable on current medication regimen. Patient to continue prescribed medication.            Wellness examination         Glucose intolerance    Orders:    Hemoglobin A1c (w/out EAG); Future    T4, free; Future    TSH, 3rd generation; Future    Comprehensive metabolic panel; Future    Immunizations and preventive care screenings were discussed with patient today. Appropriate education was printed on patient's after visit summary.        Counseling:  Alcohol/drug use: discussed moderation in alcohol intake, the recommendations for healthy alcohol use, and avoidance of illicit drug use.      Depression Screening and Follow-up Plan: Patient was screened for depression during today's encounter. They screened negative with a PHQ-2 score of 0.          History of Present Illness     Adult Annual Physical:  Patient presents for annual physical.     Diet and Physical Activity:  - Diet/Nutrition: well balanced diet.  - Exercise: moderate cardiovascular exercise, 3-4 times a week on average and 1-2 hours on average.    Depression Screening:  - PHQ-2 Score: 0    Review of Systems   Constitutional:  Negative for fatigue, fever and unexpected weight change.   HENT:  Negative for congestion, sinus pain and sore throat.    Eyes:  Negative for visual disturbance.   Respiratory:  Negative for shortness of breath and wheezing.    Cardiovascular:  Negative for chest pain and palpitations.   Gastrointestinal:  Negative for abdominal pain, nausea and  "vomiting.   Musculoskeletal: Negative.  Negative for arthralgias and myalgias.   Neurological:  Negative for syncope, weakness and numbness.   Psychiatric/Behavioral: Negative.  Negative for confusion, dysphoric mood and suicidal ideas.          Objective   /88 (BP Location: Left arm, Patient Position: Sitting, Cuff Size: Standard)   Pulse 66   Temp (!) 96.1 °F (35.6 °C) (Tympanic)   Ht 5' 8\" (1.727 m)   Wt 87.5 kg (193 lb)   SpO2 97%   BMI 29.35 kg/m²     Physical Exam  Neck:      Thyroid: No thyromegaly.   Cardiovascular:      Rate and Rhythm: Normal rate and regular rhythm.   Pulmonary:      Effort: Pulmonary effort is normal.      Breath sounds: Normal breath sounds.   Abdominal:      Palpations: Abdomen is soft. There is no mass.      Tenderness: There is no abdominal tenderness.   Musculoskeletal:         General: Normal range of motion.      Cervical back: Normal range of motion and neck supple.   Lymphadenopathy:      Cervical: No cervical adenopathy.         "

## 2025-02-14 NOTE — ASSESSMENT & PLAN NOTE
Labs pending    Orders:    Hemoglobin A1c (w/out EAG); Future    T4, free; Future    TSH, 3rd generation; Future    Comprehensive metabolic panel; Future

## 2025-02-14 NOTE — ADDENDUM NOTE
Addended by: SABINA DEL CID on: 2/14/2025 04:02 PM     Modules accepted: Orders    
Scribe Attestation (For Scribes USE Only)...

## 2025-02-18 ENCOUNTER — OFFICE VISIT (OUTPATIENT)
Dept: PHYSICAL THERAPY | Facility: CLINIC | Age: 45
End: 2025-02-18
Payer: COMMERCIAL

## 2025-02-18 DIAGNOSIS — M75.102 LEFT ROTATOR CUFF TEAR ARTHROPATHY: Primary | ICD-10-CM

## 2025-02-18 DIAGNOSIS — M12.812 LEFT ROTATOR CUFF TEAR ARTHROPATHY: Primary | ICD-10-CM

## 2025-02-18 PROCEDURE — 97140 MANUAL THERAPY 1/> REGIONS: CPT | Performed by: PHYSICAL THERAPIST

## 2025-02-18 PROCEDURE — 97110 THERAPEUTIC EXERCISES: CPT | Performed by: PHYSICAL THERAPIST

## 2025-02-18 NOTE — PROGRESS NOTES
"Daily Note     Today's date: 2025  Patient name: Rayne Irizarry  : 1980  MRN: [id number]  Referring provider: Prabha Su PA-C  Dx:   Encounter Diagnosis     ICD-10-CM    1. Left rotator cuff tear arthropathy  M75.102     M12.812                Subjective:  Pt reports he only had a little soreness after the last PT session.  Overall feeling pretty good.           Objective:  See treatment diary below.        Assessment:  Pt presented to physical therapy with complaints of L shoulder pain following L RTC repair on 24.  PROM L shoulder was full in all motions in all planes for the first time since surgery today with only min endrange pain in flex and ER.  No issues with added exercises.  He will continue to benefit from skilled PT to maximize function of L UE.      Plan:  Add light strengthening on 25 once he is 10 weeks post-op with isometrics, standing shoulder flex + scap, wall push ups, wall wash, light weights for prone PREs.  Continue to achieve full L shoulder PROM.         Precautions:  Sling until 25  CO-MORBIDITIES:  L RTC repair 24 PROTOCOL ATTACHED BELOW - To MD on 3/13/25  HEP ACCESS CODE:   FOTO Completed On:     POC Expires Reeval for Medicare to be completed Unit LImit Auth Expiration Date PT/OT/STVisit Limit   3/17/25 By visit  N/A N/A N/A BOMN                     TREATMENT DIARY  Auth Status DATE  1/ 1   NA Visit # 1 2 3 4 5 6 7 8    Remain N/A          MANUAL THERAPY           PROM L shoulder 20' 20' 20' 20' 20' 20' 12' 12'                         THERAPEUTIC EXERCISE HEP           AROM L elbow flex/ext  10 2 x 10 20 20 20 20 2# 20 2# 20   AROM L forearm sup/pron  10 2 x 10 20 20 20 20 D/C    AROM L wrist flex/ext  10 2 x 10 20 20 20 20 D/C    L hand gripping  10 2 x 10  Blue digiflex 20 Blue digiflex 20 Blue digiflex 30 Blue digiflex 30 Black digiflex 30 Black digiflex 30   Scap retractions   5\" x 10 10 20 20 20 " "D/C    Pendulums L CW/CCW 1/7     20 ea 20 ea D/C    Bent over mid trap R 1/7     10 10 D/C    Bent over rows R 1/7     10 10 D/C    Prone rows R 2/18       NV 20   Prone \"I\" R to hip height 2/18       NV 20   S/L R shoulder ER 2/18       NV 20   Table slides R shoulder flex + scap 2/13       20 ea 20 ea   Strap behind back stretch into IR R 2/13       10\" 10 10\" 10                Supine protraction R 2/13       20 2# 20   Standing cane AAROM L shoulder flex, ext, ER 2/13       10 ea 10 ea   NEUROMUSCULAR REEDUCATION                                                             THERAPEUTIC ACTIVITY            Flexbar sup/pron        Red 20 ea Red 20 ea   Flexbar R wrist flex/ext        Red 20 ea Red 20 ea                           GAIT TRAINING                                                            MODALITIES           CP L shoulder sitting 10' 10'    10' D/C                                       "

## 2025-02-20 ENCOUNTER — OFFICE VISIT (OUTPATIENT)
Dept: PHYSICAL THERAPY | Facility: CLINIC | Age: 45
End: 2025-02-20
Payer: COMMERCIAL

## 2025-02-20 DIAGNOSIS — M75.102 LEFT ROTATOR CUFF TEAR ARTHROPATHY: Primary | ICD-10-CM

## 2025-02-20 DIAGNOSIS — M12.812 LEFT ROTATOR CUFF TEAR ARTHROPATHY: Primary | ICD-10-CM

## 2025-02-20 PROCEDURE — 97110 THERAPEUTIC EXERCISES: CPT | Performed by: PHYSICAL THERAPIST

## 2025-02-20 PROCEDURE — 97140 MANUAL THERAPY 1/> REGIONS: CPT | Performed by: PHYSICAL THERAPIST

## 2025-02-20 NOTE — PROGRESS NOTES
"Daily Note     Today's date: 2025  Patient name: Rayne Irizarry  : 1980  MRN: [id number]  Referring provider: Prabha Su PA-C  Dx:   Encounter Diagnosis     ICD-10-CM    1. Left rotator cuff tear arthropathy  M75.102     M12.812                Subjective:  Pt reports he continues to feel pretty good.             Objective:  See treatment diary below.        Assessment:  Pt presented to physical therapy with complaints of L shoulder pain following L RTC repair on 24.  PROM L shoulder continues to be full with only min endrange pain in flex and ER.  No issues with added wall wash in flex, but min soreness into abd.  He will continue to benefit from skilled PT to maximize function of L UE.      Plan:  Add light strengthening on 25 once he is 10 weeks post-op with isometrics, standing shoulder flex + scap, wall push ups, light weights for prone PREs.  Continue to achieve full L shoulder PROM.         Precautions:  Sling until 25  CO-MORBIDITIES:  L RTC repair 24 PROTOCOL ATTACHED BELOW - To MD on 3/13/25  HEP ACCESS CODE:   FOTO Completed On:     POC Expires Reeval for Medicare to be completed Unit LImit Auth Expiration Date PT/OT/STVisit Limit   3/17/25 By visit  N/A N/A N/A BOMN                     TREATMENT DIARY  Auth Status DATE    NA Visit # 4 5 6 7 8 9    Remain N/A        MANUAL THERAPY         PROM L shoulder 20' 20' 20' 12' 12' 12'                     THERAPEUTIC EXERCISE HEP         AROM L elbow flex/ext  20 20 20 2# 20 2# 20 2# 20   AROM L forearm sup/pron  20 20 20 D/C     AROM L wrist flex/ext  20 20 20 D/C     L digiflex  Blue digiflex 20 Blue digiflex 30 Blue digiflex 30 Black digiflex 30 Black digiflex 30 Black 30   Scap retractions  20 20 20 D/C     Pendulums L CW/CCW   20 ea 20 ea D/C     Bent over mid trap L   10 10 D/C     Bent over rows L   10 10 D/C     Prone rows L     NV 20 20   Prone \"I\" L to hip height " "2/18    NV 20 20   S/L L shoulder ER 2/18    NV 20 20   Table slides L shoulder flex + scap 2/13    20 ea 20 ea D/C   Strap behind back stretch into IR L 2/13    10\" 10 10\" 10  10\" 10             Supine protraction L 2/13    20 2# 20 2# 20   Standing cane AAROM L shoulder flex, ext, ER 2/13    10 ea 10 ea 10 ea   NEUROMUSCULAR REEDUCATION                                                   THERAPEUTIC ACTIVITY          Flexbar sup/pron     Red 20 ea Red 20 ea Red 20 ea   Flexbar L wrist flex/ext     Red 20 ea Red 20 ea Red 20 ea   Wall wash L shoulder flex + scap 2/20      20 ea             GAIT TRAINING                                                  MODALITIES         CP L shoulder sitting   10' D/C                                      " O-Z Plasty Text: The defect edges were debeveled with a #15 scalpel blade.  Given the location of the defect, shape of the defect and the proximity to free margins an O-Z plasty (double transposition flap) was deemed most appropriate.  Using a sterile surgical marker, the appropriate transposition flaps were drawn incorporating the defect and placing the expected incisions within the relaxed skin tension lines where possible.    The area thus outlined was incised deep to adipose tissue with a #15 scalpel blade.  The skin margins were undermined to an appropriate distance in all directions utilizing iris scissors.  Hemostasis was achieved with electrocautery.  The flaps were then transposed into place, one clockwise and the other counterclockwise, and anchored with interrupted buried subcutaneous sutures.

## 2025-02-25 ENCOUNTER — OFFICE VISIT (OUTPATIENT)
Dept: PHYSICAL THERAPY | Facility: CLINIC | Age: 45
End: 2025-02-25
Payer: COMMERCIAL

## 2025-02-25 DIAGNOSIS — M75.102 LEFT ROTATOR CUFF TEAR ARTHROPATHY: Primary | ICD-10-CM

## 2025-02-25 DIAGNOSIS — M12.812 LEFT ROTATOR CUFF TEAR ARTHROPATHY: Primary | ICD-10-CM

## 2025-02-25 DIAGNOSIS — E03.9 ACQUIRED HYPOTHYROIDISM: ICD-10-CM

## 2025-02-25 PROCEDURE — 97140 MANUAL THERAPY 1/> REGIONS: CPT | Performed by: PHYSICAL THERAPIST

## 2025-02-25 PROCEDURE — 97110 THERAPEUTIC EXERCISES: CPT | Performed by: PHYSICAL THERAPIST

## 2025-02-25 NOTE — PROGRESS NOTES
Daily Note     Today's date: 2025  Patient name: Rayne Irizarry  : 1980  MRN: [id number]  Referring provider: Prabha Su PA-C  Dx:   Encounter Diagnosis     ICD-10-CM    1. Left rotator cuff tear arthropathy  M75.102     M12.812                Subjective:  Pt reports he continues to feel good.             Objective:  See treatment diary below.        Assessment:  Pt presented to physical therapy with complaints of L shoulder pain following L RTC repair on 24.  PROM L shoulder continues to be full with only min endrange pain in flex and ER.  No issues with added exercises today with focus on light strengthening of L shoulder for the first time since surgery.  Most difficulty today with wall wash in scap plane and S/L L shoulder ER.  He will continue to benefit from skilled PT to maximize function of L UE.      Plan:  Continue to add light strengthening this week.  Add standing shoulder flex + scap, prone mid and lower traps without weights.  Continue to achieve full L shoulder PROM.         Precautions:  Sling until 25  CO-MORBIDITIES:  L RTC repair 24 PROTOCOL ATTACHED BELOW - To MD on 3/13/25  HEP ACCESS CODE:   FOTO Completed On:     POC Expires Reeval for Medicare to be completed Unit LImit Auth Expiration Date PT/OT/STVisit Limit   3/17/25 By visit  N/A N/A N/A BOMN                     TREATMENT DIARY  Auth Status DATE    NA Visit # 4 5 6 7 8 9 10    Remain N/A         MANUAL THERAPY          PROM L shoulder 20' 20' 20' 12' 12' 12' 10'                       THERAPEUTIC EXERCISE HEP          AROM L elbow flex/ext  20 20 20 2# 20 2# 20 2# 20 2# 20   AROM L forearm sup/pron  20 20 20 D/C      AROM L wrist flex/ext  20 20 20 D/C      L digiflex  Blue digiflex 20 Blue digiflex 30 Blue digiflex 30 Black digiflex 30 Black digiflex 30 Black 30 Black 30   Scap retractions  20 20 20 D/C      Pendulums L CW/CCW   20 ea 20 ea D/C      Bent  "over mid trap L 1/7  10 10 D/C      Bent over rows L 1/7  10 10 D/C      Prone rows L 2/18    NV 20 20 2# 20   Prone \"I\" L to hip height 2/18    NV 20 20 2# 20   S/L L shoulder ER 2/18    NV 20 20 2# 20   Table slides L shoulder flex + scap 2/13    20 ea 20 ea D/C    Strap behind back stretch into IR L 2/13    10\" 10 10\" 10  10\" 10 10\" 10   Standing B shoulder flex+ scap        NV   Isometrics L shoulder ER, IR, protraction, retraction       NV 5\" 5 ea   Supine protraction L 2/13    20 2# 20 2# 20 2# 20   Standing cane AAROM L shoulder flex, ext, ER 2/13    10 ea 10 ea 10 ea 10 ea   NEUROMUSCULAR REEDUCATION                                                        THERAPEUTIC ACTIVITY           Flexbar sup/pron     Red 20 ea Red 20 ea Red 20 ea Red 20 ea   Flexbar L wrist flex/ext     Red 20 ea Red 20 ea Red 20 ea Red 20 ea   Wall wash L shoulder flex + scap 2/20      20 ea 20 ea   Wall push ups       NV 20              GAIT TRAINING                                                       MODALITIES          CP L shoulder sitting   10' D/C                                        "

## 2025-02-26 RX ORDER — LEVOTHYROXINE SODIUM 75 UG/1
75 TABLET ORAL DAILY
Qty: 90 TABLET | Refills: 1 | Status: SHIPPED | OUTPATIENT
Start: 2025-02-26

## 2025-02-27 ENCOUNTER — OFFICE VISIT (OUTPATIENT)
Dept: PHYSICAL THERAPY | Facility: CLINIC | Age: 45
End: 2025-02-27
Payer: COMMERCIAL

## 2025-02-27 DIAGNOSIS — M75.102 LEFT ROTATOR CUFF TEAR ARTHROPATHY: Primary | ICD-10-CM

## 2025-02-27 DIAGNOSIS — M12.812 LEFT ROTATOR CUFF TEAR ARTHROPATHY: Primary | ICD-10-CM

## 2025-02-27 PROCEDURE — 97140 MANUAL THERAPY 1/> REGIONS: CPT | Performed by: PHYSICAL THERAPIST

## 2025-02-27 PROCEDURE — 97110 THERAPEUTIC EXERCISES: CPT | Performed by: PHYSICAL THERAPIST

## 2025-02-27 NOTE — PROGRESS NOTES
"Daily Note     Today's date: 2025  Patient name: Rayne Irizarry  : 1980  MRN: [id number]  Referring provider: Prabha Su PA-C  Dx:   Encounter Diagnosis     ICD-10-CM    1. Left rotator cuff tear arthropathy  M75.102     M12.812                Subjective:  Pt reports he is progressing well.              Objective:  See treatment diary below.        Assessment:  Pt presented to physical therapy with complaints of L shoulder pain following L RTC repair on 24.  PROM L shoulder continues to be full with only min endrange pain in ER, not flex anymore.  No issues with added exercises today with focus on light strengthening of L shoulder.  Most difficulty today with wall wash in scap plane, prone mid traps and S/L L shoulder ER.  He will continue to benefit from skilled PT to maximize function of L UE.      Plan:  Continue to add light strengthening next week.  Continue to achieve full L shoulder PROM.  Add OH cone stacking.        Precautions:  Sling until 25  CO-MORBIDITIES:  L RTC repair 24 PROTOCOL ATTACHED BELOW - To MD on 3/13/25  HEP ACCESS CODE:   FOTO Completed On:     POC Expires Reeval for Medicare to be completed Unit LImit Auth Expiration Date PT/OT/STVisit Limit   3/17/25 By visit  N/A N/A N/A BOMN                     TREATMENT DIARY  Auth Status DATE    NA Visit # 6 7 8 9 10 11    Remain         MANUAL THERAPY         PROM L shoulder 20' 12' 12' 12' 10' 8'                     THERAPEUTIC EXERCISE HEP         AROM L elbow flex/ext  20 2# 20 2# 20 2# 20 2# 20 2# 20   Standing B shoulder flex      NV 20   Standing B scaptions      NV 20   L digiflex  Blue digiflex 30 Black digiflex 30 Black digiflex 30 Black 30 Black 30 Black 30   Prone \"T\" L      NV 20   Prone \"Y\" L      NV 20   Prone rows L   NV 20 20 2# 20 2# 20   Prone \"I\" L to hip height   NV 20 20 2# 20 2# 20   S/L L shoulder ER   NV 20 20 2# 20 2# 20   Table slides L shoulder " "flex + scap 2/13  20 ea 20 ea D/C     Strap behind back stretch into IR L 2/13  10\" 10 10\" 10  10\" 10 10\" 10 10\" 10   Isometrics L shoulder ER, IR, protraction, retraction     NV 5\" 5 ea 5\" 5 ea   Supine protraction L 2/13  20 2# 20 2# 20 2# 20 2# 20   Standing cane AAROM L shoulder flex, ext, ER 2/13  10 ea 10 ea 10 ea 10 ea 10 ea   NEUROMUSCULAR REEDUCATION                                                   THERAPEUTIC ACTIVITY          Flexbar sup/pron   Red 20 ea Red 20 ea Red 20 ea Red 20 ea Red 20 ea   Flexbar L wrist flex/ext   Red 20 ea Red 20 ea Red 20 ea Red 20 ea Red 20 ea   Wall wash L shoulder flex + scap 2/20    20 ea 20 ea 20 ea   Wall push ups     NV 20 20             GAIT TRAINING                                                  MODALITIES         CP L shoulder sitting 10' D/C                                        "

## 2025-03-04 ENCOUNTER — OFFICE VISIT (OUTPATIENT)
Dept: PHYSICAL THERAPY | Facility: CLINIC | Age: 45
End: 2025-03-04
Payer: COMMERCIAL

## 2025-03-04 DIAGNOSIS — M12.812 LEFT ROTATOR CUFF TEAR ARTHROPATHY: Primary | ICD-10-CM

## 2025-03-04 DIAGNOSIS — M75.102 LEFT ROTATOR CUFF TEAR ARTHROPATHY: Primary | ICD-10-CM

## 2025-03-04 PROCEDURE — 97110 THERAPEUTIC EXERCISES: CPT | Performed by: PHYSICAL THERAPIST

## 2025-03-04 PROCEDURE — 97140 MANUAL THERAPY 1/> REGIONS: CPT | Performed by: PHYSICAL THERAPIST

## 2025-03-04 NOTE — PROGRESS NOTES
"Daily Note     Today's date: 3/4/2025  Patient name: Rayne Irizarry  : 1980  MRN: [id number]  Referring provider: Prabha Su PA-C  Dx:   Encounter Diagnosis     ICD-10-CM    1. Left rotator cuff tear arthropathy  M75.102     M12.812                Subjective:  Pt reports his R upper arm is fairly sore this week.  ROM feels good.  Soreness is a deep sensation.                Objective:  See treatment diary below        Assessment:  Pt presented to physical therapy with complaints of L shoulder pain following L RTC repair on 24.  PROM L shoulder continues to be full with only min endrange pain in ER.  Some soreness with OH motions, but no sharp pain.  Most fatigue in scap plane.  Good tolerance to added weights.  He will continue to benefit from skilled PT to maximize function of L UE.      Plan:  Continue to add light strengthening in scap plane as tolerated.  Continue to achieve full L shoulder PROM.  CP post tx x 1-2 more weeks as strengthening progresses.        Precautions:  Sling until 25  CO-MORBIDITIES:  L RTC repair 24 PROTOCOL ATTACHED BELOW - To MD on 3/13/25  HEP ACCESS CODE:   FOTO Completed On:     POC Expires Reeval for Medicare to be completed Unit LImit Auth Expiration Date PT/OT/STVisit Limit   3/17/25 By visit  N/A N/A N/A BOMN                     TREATMENT DIARY  Auth Status DATE 1/9 2/13 2/18 2/20 2/25 2/27 3/4   NA Visit # 6 7 8 9 10 11 12    Remain          MANUAL THERAPY          PROM L shoulder 20' 12' 12' 12' 10' 8' 8'                       THERAPEUTIC EXERCISE HEP          AROM L elbow flex/ext  20 2# 20 2# 20 2# 20 2# 20 2# 20 3# 20   Standing B shoulder flex      NV 20 1# 20   Standing B scaptions      NV 20 1# 20   L digiflex  Blue digiflex 30 Black digiflex 30 Black digiflex 30 Black 30 Black 30 Black 30 Black 30   Prone \"T\" L      NV 20 1# 20   Prone \"Y\" L      NV 20 1# 20   Prone rows L   NV 20 20 2# 20 2# 20 3# 20   Prone \"I\" L to hip height   " "NV 20 20 2# 20 2# 20 3# 20   S/L L shoulder ER 2/18  NV 20 20 2# 20 2# 20 3# 20   Table slides L shoulder flex + scap 2/13  20 ea 20 ea D/C      Strap behind back stretch into IR L 2/13  10\" 10 10\" 10  10\" 10 10\" 10 10\" 10 10\" 10   Isometrics L shoulder ER, IR, protraction, retraction     NV 5\" 5 ea 5\" 5 ea 5\" 5 ea   Supine protraction L 2/13  20 2# 20 2# 20 2# 20 2# 20 3# 20   Standing cane AAROM L shoulder flex, ext, ER 2/13  10 ea 10 ea 10 ea 10 ea 10 ea 10 ea   NEUROMUSCULAR REEDUCATION                                                        THERAPEUTIC ACTIVITY           Flexbar sup/pron   Red 20 ea Red 20 ea Red 20 ea Red 20 ea Red 20 ea Green 20 ea   Flexbar L wrist flex/ext   Red 20 ea Red 20 ea Red 20 ea Red 20 ea Red 20 ea Green 20 ea   Wall wash L shoulder flex + scap 2/20    20 ea 20 ea 20 ea 20 ea   Wall push ups     NV 20 20 20   OH cone stacking L (5 cones)        3x   GAIT TRAINING                                                       MODALITIES          CP L shoulder sitting 10'      10'                                     "

## 2025-03-06 ENCOUNTER — APPOINTMENT (OUTPATIENT)
Dept: PHYSICAL THERAPY | Facility: CLINIC | Age: 45
End: 2025-03-06
Payer: COMMERCIAL

## 2025-03-11 ENCOUNTER — EVALUATION (OUTPATIENT)
Dept: PHYSICAL THERAPY | Facility: CLINIC | Age: 45
End: 2025-03-11
Payer: COMMERCIAL

## 2025-03-11 DIAGNOSIS — M75.102 LEFT ROTATOR CUFF TEAR ARTHROPATHY: Primary | ICD-10-CM

## 2025-03-11 DIAGNOSIS — M12.812 LEFT ROTATOR CUFF TEAR ARTHROPATHY: Primary | ICD-10-CM

## 2025-03-11 PROCEDURE — 97110 THERAPEUTIC EXERCISES: CPT | Performed by: PHYSICAL THERAPIST

## 2025-03-11 PROCEDURE — 97530 THERAPEUTIC ACTIVITIES: CPT | Performed by: PHYSICAL THERAPIST

## 2025-03-11 PROCEDURE — 97140 MANUAL THERAPY 1/> REGIONS: CPT | Performed by: PHYSICAL THERAPIST

## 2025-03-11 NOTE — PROGRESS NOTES
PT Re-evaluation    Today's date: 3/11/2025  Patient name: Rayne Irizarry  : 1980  MRN: [id number]  Referring provider: Prabha Su PA-C  Dx:   Encounter Diagnosis     ICD-10-CM    1. Left rotator cuff tear arthropathy  M75.102     M12.812                    Assessment  Impairments: abnormal or restricted ROM, activity intolerance, impaired physical strength and pain with function  Symptom irritability: low    Assessment details: Rayne Irizarry is a 44 y.o. male who presented to outpatient physical therapy at [facility] with complaints of L shoulder pain.  He presented with decreased L shoulder range of motion, decreased L UE strength, limited flexibility, poor postural awareness, decreased tolerance to activity and decreased functional mobility following L RTC repair on 24.  His L shoulder PROM is now WNL in all motions with AROM about 75%.  Strength L shoulder is still weak as expected at less than 3 months post-op, but progressing well this week.  He will continue to benefit from skilled PT services for another 6-8 weeks in order to address these deficits and reach maximum level of function.  Thank you for the referral!        Understanding of Dx/Px/POC: excellent     Prognosis: excellent    Goals  ST.  Independent with HEP in 2 weeks - Met  2.  Increase L shoulder PROM to within 20 degrees of WNL all motions in 3 weeks - Met   3.  Good postural awareness in 2 weeks - Met    LT.  Achieve FOTO score of 59/100 in 12 weeks - Met   2.  Able to lift up to 5# to head level with L UE and complete all L UE tasks in 12 weeks - Not Met  3.  Strength all L UE motions = 4/5 in 12 weeks - Not Met      Plan  Patient would benefit from: skilled physical therapy  Planned modality interventions: cryotherapy    Planned therapy interventions: home exercise program, functional ROM exercises, joint mobilization, manual therapy, neuromuscular re-education, therapeutic exercise, therapeutic activities, stretching,  strengthening, flexibility and postural training    Frequency: 2x week  Duration in weeks: 8  Treatment plan discussed with: patient        Subjective Evaluation    History of Present Illness  Mechanism of injury: Pt reports chronic B shoulder pain of insidious onset.  Notes L shoulder pain may have been brought on after bumping into a window/window sill a few years ago.  Tried PT earlier this year without pain relief.  He had a L RTC repair on 24 and feels his progression has been good, but still with some pain in lateral proximal L arm with some weakness.  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            Recurrent probem    Quality of life: good    Patient Goals  Patient goals for therapy: decreased pain, increased strength and return to sport/leisure activities    Pain  Current pain rating: 3  At best pain ratin  At worst pain ratin  Quality: dull ache and tight  Relieving factors: ice and medications  Aggravating factors: overhead activity and lifting  Progression: improved    Social Support  Lives with: spouse    Employment status: working  Hand dominance: right    Treatments  Previous treatment: physical therapy, medication and immobilization  Current treatment: medication and physical therapy        Objective     Postural Observations  Seated posture: good  Standing posture: good  Correction of posture: makes symptoms better      Neurological Testing     Sensation     Shoulder   Left Shoulder   Intact: light touch    Right Shoulder   Intact: Light touch    Active Range of Motion   Cervical/Thoracic Spine       Cervical    Flexion: 50 degrees   Extension: 47 degrees      Left rotation: 66 degrees  Right rotation: 70 degrees       Thoracic    Right lateral flexion:  Restriction level: minimal  Left rotation:  Restriction level: minimal  Right rotation:  Restriction level: minimal  Left Shoulder   Flexion: 140 degrees   Extension: WFL  Abduction: 110 degrees with pain  External  "rotation 0°: 70 degrees   Internal rotation 45°: 75 degrees     Right Shoulder   Normal active range of motion    Passive Range of Motion   Left Shoulder   Normal passive range of motion    Right Shoulder   Normal passive range of motion    Strength/Myotome Testing     Left Shoulder     Planes of Motion   Flexion: 4-   Extension: 4+   Abduction: 3+ (pain)   External rotation at 0°: 4 (pain)   Internal rotation at 0°: 4+   Left shoulder horizontal abduction strength: pain.     Isolated Muscles   Biceps: 4+   Lower trapezius strength: pain.     Right Shoulder     Planes of Motion   Flexion: 4+   Extension: 5   Abduction: 4+   External rotation at 0°: 5   Internal rotation at 0°: 5         Precautions:  Sling until 1/28/25  CO-MORBIDITIES:  L RTC repair 12/17/24  HEP ACCESS CODE:   FOTO Completed On:     POC Expires Reeval for Medicare to be completed Unit LImit Auth Expiration Date PT/OT/STVisit Limit   5/6/25 By visit  N/A N/A N/A BOMN                       TREATMENT DIARY  Auth Status DATE 1/9 2/13 2/18 2/20 2/25 2/27 3/4 3/11   NA Visit # 6 7 8 9 10 11 12 13    Remain           MANUAL THERAPY           PROM L shoulder 20' 12' 12' 12' 10' 8' 8' 8'                         THERAPEUTIC EXERCISE HEP           AROM L elbow flex/ext 12/23 20 2# 20 2# 20 2# 20 2# 20 2# 20 3# 20 3# 20   Standing B shoulder flex      NV 20 1# 20 1# 20   Standing B scaptions      NV 20 1# 20 1# 20   L digiflex 12/23 Blue digiflex 30 Black digiflex 30 Black digiflex 30 Black 30 Black 30 Black 30 Black 30 Black 30   Cross body stretch into L shoulder horiz adduction 3/11           Prone \"T\" L      NV 20 1# 20 1# 20   Prone \"Y\" L      NV 20 1# 20 1# 20   Prone rows L 2/18  NV 20 20 2# 20 2# 20 3# 20 3# 20   Prone \"I\" L to hip height 2/18  NV 20 20 2# 20 2# 20 3# 20 3# 20   S/L L shoulder ER 2/18  NV 20 20 2# 20 2# 20 3# 20 3# 20   Table slides L shoulder flex + scap 2/13  20 ea 20 ea D/C       Strap behind back stretch into IR L 2/13  10\" 10 10\" " "10  10\" 10 10\" 10 10\" 10 10\" 10 10\" 10   Isometrics L shoulder ER, IR, protraction, retraction     NV 5\" 5 ea 5\" 5 ea 5\" 5 ea 5\" 5 ea   Supine protraction L 2/13  20 2# 20 2# 20 2# 20 2# 20 3# 20 4# medicine ball 20   Standing cane AAROM L shoulder flex, ext, ER 2/13  10 ea 10 ea 10 ea 10 ea 10 ea 10 ea 10 ea   NEUROMUSCULAR REEDUCATION                                                             THERAPEUTIC ACTIVITY            Flexbar sup/pron   Red 20 ea Red 20 ea Red 20 ea Red 20 ea Red 20 ea Green 20 ea Green 20 ea   Flexbar L wrist flex/ext   Red 20 ea Red 20 ea Red 20 ea Red 20 ea Red 20 ea Green 20 ea Green 20 ea   Wall wash L shoulder flex + scap 2/20    20 ea 20 ea 20 ea 20 ea 20 ea   Wall push ups     NV 20 20 20 20   OH cone stacking L (5 cones) flex and scap        3x - flex only 2x ea   GAIT TRAINING                                                            MODALITIES           CP L shoulder sitting 10'      10' 10'                                      "

## 2025-03-13 ENCOUNTER — OFFICE VISIT (OUTPATIENT)
Dept: OBGYN CLINIC | Facility: CLINIC | Age: 45
End: 2025-03-13

## 2025-03-13 ENCOUNTER — OFFICE VISIT (OUTPATIENT)
Dept: PHYSICAL THERAPY | Facility: CLINIC | Age: 45
End: 2025-03-13
Payer: COMMERCIAL

## 2025-03-13 VITALS — WEIGHT: 197 LBS | HEIGHT: 68 IN | BODY MASS INDEX: 29.86 KG/M2

## 2025-03-13 DIAGNOSIS — M75.102 TEAR OF LEFT SUPRASPINATUS TENDON: Primary | ICD-10-CM

## 2025-03-13 DIAGNOSIS — M75.102 LEFT ROTATOR CUFF TEAR ARTHROPATHY: Primary | ICD-10-CM

## 2025-03-13 DIAGNOSIS — M12.812 LEFT ROTATOR CUFF TEAR ARTHROPATHY: Primary | ICD-10-CM

## 2025-03-13 PROCEDURE — 97110 THERAPEUTIC EXERCISES: CPT

## 2025-03-13 PROCEDURE — 97140 MANUAL THERAPY 1/> REGIONS: CPT

## 2025-03-13 PROCEDURE — 99024 POSTOP FOLLOW-UP VISIT: CPT | Performed by: STUDENT IN AN ORGANIZED HEALTH CARE EDUCATION/TRAINING PROGRAM

## 2025-03-13 NOTE — PROGRESS NOTES
Shoulder Post Operative Visit     Assesment:     44 y.o. male 3 months s/p Surgical Arthroscopy of the left shoulder with rotator cuff repair and subacromial decompression, DOS: 12/17/2024    Plan:    Rayne presents today 3 months s/p left shoulder arthroscopy with rotator cuff repair and subacromial decompression, DOS: 12/17/2024. He states he is doing well overall at this time. Patient should continue with physical therapy according to protocol. Discussed that he has some stiffness and demonstrated stretches in office for patient to do on his own at home. Discussed that should continue use of OTC pain medication as needed for discomfort. He is to follow up in 2 months time.     Post-Operative treatment:    PT for ROM and strengthening to shoulder, rotator cuff, scapular stabilizers.  OTC medication as needed for pain.    Imaging:    No imaging was available for review today.    Sling:  none    DVT Prophylaxis:  Ambulation    Follow up:   2 months    Patient was advised that if they have any fevers, chills, chest pain, shortness of breath, redness or drainage from the incision, please let our office know immediately.        Chief Complaint   Patient presents with    Left Shoulder - Post-op     Sx 12/17/24   Feeling good        History of Present Illness:    The patient is a 44 y.o. male who is being evaluated post operatively 3 months status post left shoulder arthroscopy with rotator cuff repair and subacromial decompression, DOS: 12/17/2024. Since the prior visit, He reports significant improvement. He reports his pain is well controlled with over-the-counter medications. Notes that he has some stiffness with external and internal rotation but continues with physical therapy and notices improvement in his ROM. He denies any new injuries and denies any numbness or tingling.     The patient denies any fevers, chills, calf pain, chest pain/shortness of breath, redness or drainage from the incision.         I have  "reviewed the past medical, surgical, social and family history, medications and allergies as documented in the EMR.    Review of systems: ROS is negative other than that noted in the HPI.  Constitutional: Negative for fatigue and fever.       Physical Exam:    Height 5' 8\" (1.727 m), weight 89.4 kg (197 lb).    General/Constitutional: NAD, well developed, well nourished  HENT: Normocephalic, atraumatic  CV: Intact distal pulses, regular rate  Resp: No respiratory distress or labored breathing  Lymphatic: No lymphadenopathy palpated  Neuro: Alert and Oriented x 3, no focal deficits  Psych: Normal mood, normal affect, normal judgement, normal behavior  Skin: Warm, dry, no rashes, no erythema    Shoulder focused exam:        Visual inspection of the shoulder demonstrates normal contour without atrophy  Incisions healed  No evidence of scapular dyskinesia or atrophy.  No scapular winging  Active forward flexion to 160, abduction to 90 degrees, external rotation 45 degrees with are at side, internal rotation to lumbar, able to reach occiput with stiffness  Gentle strength is 4+/5 throughout    Incisions show no erythema, no drainage      UE NV Exam: +2 Radial pulses bilaterally  Sensation intact to light touch C5-T1 bilaterally, Radial/median/ulnar nerve motor intact      Scribe Attestation      I,:  Prabha Su PA-C am acting as a scribe while in the presence of the attending physician.:       I,:  Rene Aponte DO personally performed the services described in this documentation    as scribed in my presence.:                  "

## 2025-03-13 NOTE — PROGRESS NOTES
"Daily Note     Today's date: 3/13/2025  Patient name: Rayne Irizarry  : 1980  MRN: [id number]  Referring provider: Prabha Su PA-C  Dx:   Encounter Diagnosis     ICD-10-CM    1. Left rotator cuff tear arthropathy  M75.102     M12.812           Start Time: 07  Stop Time: 0755  Total time in clinic (min): 53 minutes    Subjective: Patient notes delt/ bicep weakness with OH movements. States that his shoulder is overall doing well.      Objective: See treatment diary below      Assessment: Tolerated treatment well. Patient demonstrated fatigue post treatment and would benefit from continued PT. Session initiated with prone exercises. Periscap fatigue noted with activities, but was able to activities w/o pain.       Plan: Continue per plan of care.      Precautions:  Sling until 25  CO-MORBIDITIES:  L RTC repair 24  HEP ACCESS CODE:   FOTO Completed On:     POC Expires Reeval for Medicare to be completed Unit LImit Auth Expiration Date PT/OT/STVisit Limit   25 By visit  N/A N/A N/A BOMN                       TREATMENT DIARY  Auth Status DATE 2/13 2/18 2/20 2/25 2/27 3/4 3/11 3/13   NA Visit # 7 8 9 10 11 12 13 14    Remain           MANUAL THERAPY           PROM L shoulder 12' 12' 12' 10' 8' 8' 8' 8'                         THERAPEUTIC EXERCISE HEP           AROM L elbow flex/ext  2# 20 2# 20 2# 20 2# 20 2# 20 3# 20 3# 20 3# 20   Standing B shoulder flex     NV 20 1# 20 1# 20 1# 20   Standing B scaptions     NV 20 1# 20 1# 20 1# 20   L digiflex  Black digiflex 30 Black digiflex 30 Black 30 Black 30 Black 30 Black 30 Black 30    Cross body stretch into L shoulder horiz adduction 3/11           Prone \"T\" L     NV 20 1# 20 1# 20 1# 20   Prone \"Y\" L     NV 20 1# 20 1# 20 1# 20   Prone rows L  NV 20 20 2# 20 2# 20 3# 20 3# 20 3# 20   Prone \"I\" L to hip height  NV 20 20 2# 20 2# 20 3# 20 3# 20 3# 20   S/L L shoulder ER 2/18 NV 20 20 2# 20 2# 20 3# 20 3# 20 3# 20   Table slides L " "shoulder flex + scap 2/13 20 ea 20 ea D/C        Strap behind back stretch into IR L 2/13 10\" 10 10\" 10  10\" 10 10\" 10 10\" 10 10\" 10 10\" 10 10\" 10   Isometrics L shoulder ER, IR, protraction, retraction    NV 5\" 5 ea 5\" 5 ea 5\" 5 ea 5\" 5 ea    Supine protraction L 2/13 20 2# 20 2# 20 2# 20 2# 20 3# 20 4# medicine ball 20 4# medicine ball 20    Standing cane AAROM L shoulder flex, ext, ER 2/13 10 ea 10 ea 10 ea 10 ea 10 ea 10 ea 10 ea 10 ea   NEUROMUSCULAR REEDUCATION                                                             THERAPEUTIC ACTIVITY            Flexbar sup/pron  Red 20 ea Red 20 ea Red 20 ea Red 20 ea Red 20 ea Green 20 ea Green 20 ea Green 20 ea   Flexbar L wrist flex/ext  Red 20 ea Red 20 ea Red 20 ea Red 20 ea Red 20 ea Green 20 ea Green 20 ea Green 20 ea   Wall wash L shoulder flex + scap 2/20   20 ea 20 ea 20 ea 20 ea 20 ea 20ea   Wall push ups    NV 20 20 20 20 20   OH cone stacking L (5 cones) flex and scap       3x - flex only 2x ea 2xea   GAIT TRAINING                                                            MODALITIES           CP L shoulder sitting      10' 10' 10'                                        "

## 2025-03-14 ENCOUNTER — OFFICE VISIT (OUTPATIENT)
Age: 45
End: 2025-03-14
Payer: COMMERCIAL

## 2025-03-14 ENCOUNTER — TELEPHONE (OUTPATIENT)
Age: 45
End: 2025-03-14

## 2025-03-14 VITALS
HEART RATE: 78 BPM | BODY MASS INDEX: 29.58 KG/M2 | WEIGHT: 195.2 LBS | SYSTOLIC BLOOD PRESSURE: 126 MMHG | DIASTOLIC BLOOD PRESSURE: 70 MMHG | RESPIRATION RATE: 16 BRPM | TEMPERATURE: 96 F | HEIGHT: 68 IN

## 2025-03-14 DIAGNOSIS — E66.3 OVERWEIGHT (BMI 25.0-29.9): Primary | ICD-10-CM

## 2025-03-14 DIAGNOSIS — R73.01 IMPAIRED FASTING GLUCOSE: ICD-10-CM

## 2025-03-14 DIAGNOSIS — E66.3 OVERWEIGHT WITH BODY MASS INDEX (BMI) OF 28 TO 28.9 IN ADULT: ICD-10-CM

## 2025-03-14 DIAGNOSIS — E78.5 DYSLIPIDEMIA: ICD-10-CM

## 2025-03-14 PROCEDURE — 99214 OFFICE O/P EST MOD 30 MIN: CPT | Performed by: PHYSICIAN ASSISTANT

## 2025-03-14 RX ORDER — TIRZEPATIDE 2.5 MG/.5ML
2.5 INJECTION, SOLUTION SUBCUTANEOUS WEEKLY
Qty: 2 ML | Refills: 0 | Status: SHIPPED | OUTPATIENT
Start: 2025-03-14 | End: 2025-04-11

## 2025-03-14 NOTE — PATIENT INSTRUCTIONS
I have sent Zebound to your pharmacy. The prior authorization process will been done through our prior authorization team and can take up to 3-4 weeks to process through the insurance.     - Start Zepbound 2.5 mg subcutaneously weekly. After you have taken the second pen, please give me an update, as we will likely increase the dose the next month if you are tolerating it well.    - Side effects of Zepbound include nausea, vomiting, diarrhea, or constipation. Keep an eye on your heart rate while on Zepbound. If you resting heart rate is greater than 100 beats per minutes, please notify me. If you develop severe abdominal pain, stop Zepbound and go to the emergency room, as that could be a sign of pancreatitis.     - Please notify me if you have surgery, upper endoscopy, or colonoscopy scheduled, as we typically hold Zepbound for one week prior to the procedure.     GLP-1 Managing Side Effects Tips:  - focus on small frequent meals  - moderate sugar and fat intake  - change the injection site (abdomen --> thigh--> back of arm)  - eat protein, crackers, mints and luda-based drinks  - nighttime injections  - drink plenty of water  - consider fiber supplements like miralax    Tips for Nausea:  - Don't drink and eat simultaneously: separate fluids 30-60 minutes before and after meals when experiencing nausea or if you notice you become full quickly  - Choose mild smelling foods- strong smells can exacerbate nausea  - Luda and peppermint- consider drinking luda or peppermint tea, which can help alleviate symptoms  - Eat- don't skip meals, if you have nausea, it is understandable that you may not feel like eating. However, skipping meals is generally not recommended as this can lead to lower blood sugar and fatigue. Furthermore, it is essential to consume adequate protein during weight loss. You can opt for a protein shake, a meal replacement supplement, bone broth or soup.     Tips for Constipation:   - Drink plenty  of water  - Eat food with a high fiber content: increase your fiber intake by consuming these types of foods:   Eat more whole grain products like barley, oats, farro, brown or wild rice and quinoa.    Look for choices with 100% whole wheat, rye, oats or bran as the first ingredient listed    Check nutrition fact labels and choose products with 4gm of dietary fiber or more per serving   Add more beans to your diet   Eat more fresh fruits and vegetables with skins on them    Exercise- increase physical activity as it helps stimulate gastric mobility, which moves stool through the digestive tract

## 2025-03-14 NOTE — PROGRESS NOTES
Assessment/Plan:    No problem-specific Assessment & Plan notes found for this encounter.    1. Overweight (BMI 25.0-29.9)  tirzepatide (Zepbound) 2.5 mg/0.5 mL auto-injector    Insulin, fasting    Lipid panel      2. Overweight with body mass index (BMI) of 28 to 28.9 in adult  tirzepatide (Zepbound) 2.5 mg/0.5 mL auto-injector      3. Impaired fasting glucose  tirzepatide (Zepbound) 2.5 mg/0.5 mL auto-injector    Insulin, fasting    Lipid panel      4. Dyslipidemia  tirzepatide (Zepbound) 2.5 mg/0.5 mL auto-injector    Lipid panel         There are no diagnoses linked to this encounter.    - Discussed options of HealthyCORE-Intensive Lifestyle Intervention Program, Very Low Calorie Diet-VLCD, and Conservative Program and the role of weight loss medications.  - Explained the importance of making lifestyle changes first before starting anti-obesity medications.  - Patient should demonstrate lifestyle changes first before anti-obesity medication initiated.   - Patient is interested in pursuing Conservative Program  - Initial weight loss goal of 5-10% weight loss for improved health as studies have shown this is where we see the greatest impact on improving health and decreasing risk of obesity related conditions.  - Weight loss can improve patient's co-morbid conditions and/or prevent weight-related complications.    - Labs reviewed: As below.      General Recommendations:  Nutrition:  Eat breakfast daily.  Do not skip meals.     Food log (ie.) www.[employer].com, sparkpeople.com, loseit.com, calorieking.com, etc.    Practice mindful eating.  Be sure to set aside time to eat, eat slowly, and savor your food.    Hydration:    At least 64oz of water daily.  No sugar sweetened beverages.  No juice (eat the fruit instead).    Exercise:  Studies have shown that the ideal exercise goal is somewhere between 150 to 300 minutes of moderate intensity exercise a week.  Start with exercising 10 minutes every other day and  gradually increase physical activity with a goal of at least 150 minutes of moderate intensity exercise a week, divided over at least 3 days a week.  An example of this would be exercising 30 minutes a day, 5 days a week.  Resistance training can increase muscle mass and increase our resting metabolic rate.   FULL BODY resistance training is recommended 2-3 times a week.  Do not do this on consecutive days to allow for muscle recovery.    Aim for a bare minimum 5000 steps, even on days you do not exercise.    Monitoring:   Weigh yourself daily.  If this causes undue stress, then just weigh yourself once a week.  Weigh yourself the same time of the day with the same amount of clothing on.  Preferably this should be done after waking up, before you eat, and with no clothing or minimal clothing on.    Specific Goals:  Gradually increase physical activity to a goal of 5 days per week for 30 minutes of MODERATE intensity PLUS 2 days per week of FULL BODY resistance training  Goal protein intake of 60-80 grams per day  5-10 servings of fruits and vegetables per day    Calorie goal:  focus on protein goals 100-120 grams daily; referral to dietitian  (Provided with meal plan to follow).    Return visit:  2 months  1)  RX Zepbound 2.5mg x 4 weeks and then increase to 5mg once weekly. Emphasized the importance of adherence to the medication schedule and lifestyle modifications, including diet and exercise. Provided patient with written materials on Zepbound usage, dietary plans, and exercise recommendations. Discussed the importance of regular monitoring and follow up to ensure the safety and effectiveness of the treatment.  Education provided on side effects, how to monitor and when to see medical attention.  Patient received training on self-administration of the injection. Goal of treatment is to attain a weight loss of approximately 5-10% TBW within next 3-6 months.  Goal is to achieve weight loss to improve overall  health and reduce risks associated with obesity and weight related comorbidities.        I have sent Zebound to your pharmacy. The prior authorization process will been done through our prior authorization team and can take up to 3-4 weeks to process through the insurance.     - Start Zepbound 2.5 mg subcutaneously weekly. After you have taken the second pen, please give me an update, as we will likely increase the dose the next month if you are tolerating it well.    - Side effects of Zepbound include nausea, vomiting, diarrhea, or constipation. Keep an eye on your heart rate while on Zepbound. If you resting heart rate is greater than 100 beats per minutes, please notify me. If you develop severe abdominal pain, stop Zepbound and go to the emergency room, as that could be a sign of pancreatitis.     - Please notify me if you have surgery, upper endoscopy, or colonoscopy scheduled, as we typically hold Zepbound for one week prior to the procedure.     2) Fasting labs- pending     3) Nutrition RX:  - start tracking intake  - focus on protein- goal is 30 grams per meal; 90 grams per day  - focus on increasing fiber first by increasing vegetable servings per day  - do not skip breakfast and goal water intake 64 oz daily    Physical Activity RX:  - Goal is 150-200 mins of activity weekly.  Try to include at least 2 strength training sessions; increasing lean body mass will really help you to lose weight and maintain weight loss.          Subjective:   Chief Complaint   Patient presents with    Follow-up     MWM- OD f/u; Waist-39.5in        Patient ID: Rayne Irizarry  is a 44 y.o. male with excess weight/obesity here to pursue weight managment.  Patient is pursuing Very Low Calorie Diet-VLCD.     Patient was not able to continue the VLCD; was not for him  he was not able to fit the diet restrictions into his lifestyle.    Mother just passed away from T2DM in January.  After his mother's death, patient decided he wanted to  try to lose weight again to improve his overall health and decrease risks.     Physical activity:  He is getting back into activity, when walking he can get short of breath, he is taking albuterol which helps. He also thinks he has some allergies.  He was worked up by cardiology and patient reports that his tests were all within normal limits and it was recommended that he lose weight.     He did try Saxenda in 2022 which helped but then moved and his insurance did not cover so he stopped. He tolerated well for the most part.     B- coffee  L- rice, roti bread (B[place]ian bread), valdez  D- valdez, chicken, vegetable     HPI    Wt Readings from Last 20 Encounters:   03/14/25 88.5 kg (195 lb 3.2 oz)   03/13/25 89.4 kg (197 lb)   02/14/25 87.5 kg (193 lb)   12/26/24 88.5 kg (195 lb)   12/17/24 88.5 kg (195 lb)   11/14/24 87.1 kg (192 lb)   10/24/24 86.8 kg (191 lb 6.4 oz)   08/20/24 85.7 kg (189 lb)   06/17/24 85.3 kg (188 lb)   06/03/24 85.4 kg (188 lb 3.2 oz)   05/21/24 85.7 kg (189 lb)   05/03/24 89.4 kg (197 lb)   05/01/24 89.4 kg (197 lb)   04/19/24 83.9 kg (185 lb)   04/01/24 89.2 kg (196 lb 9.6 oz)   03/13/24 87.9 kg (193 lb 12.8 oz)   12/04/23 85.7 kg (189 lb)   11/10/23 81.6 kg (180 lb)   11/01/23 86.3 kg (190 lb 3.2 oz)   10/05/23 84.8 kg (187 lb)           Review of Systems   Constitutional:  Negative for fatigue.   HENT:  Negative for sore throat.    Respiratory:  Negative for cough and shortness of breath.    Cardiovascular:  Negative for chest pain, palpitations and leg swelling.   Gastrointestinal:  Negative for abdominal pain, constipation, diarrhea and nausea.   Genitourinary:  Negative for dysuria.   Musculoskeletal:  Negative for arthralgias and back pain.   Skin:  Negative for rash.   Neurological:  Negative for headaches.   Psychiatric/Behavioral:  Negative for dysphoric mood. The patient is not nervous/anxious.        Objective:    /70 (BP Location: Left arm, Patient Position: Sitting)   Pulse  "78   Temp (!) 96 °F (35.6 °C) (Tympanic)   Resp 16   Ht 5' 8\" (1.727 m)   Wt 88.5 kg (195 lb 3.2 oz)   BMI 29.68 kg/m²      Physical Exam  Vitals and nursing note reviewed.   Constitutional:       Appearance: Normal appearance.   HENT:      Head: Normocephalic.   Neck:      Thyroid: No thyroid mass, thyromegaly or thyroid tenderness.   Cardiovascular:      Rate and Rhythm: Normal rate and regular rhythm.      Pulses: Normal pulses.      Heart sounds: Normal heart sounds.   Pulmonary:      Effort: Pulmonary effort is normal.      Breath sounds: Normal breath sounds.   Abdominal:      General: Abdomen is flat.      Palpations: Abdomen is soft.   Musculoskeletal:      Cervical back: Normal range of motion and neck supple. No tenderness.   Skin:     General: Skin is warm and dry.   Neurological:      General: No focal deficit present.      Mental Status: He is alert and oriented to person, place, and time.   Psychiatric:         Mood and Affect: Mood normal.         Behavior: Behavior normal.         Thought Content: Thought content normal.         Judgment: Judgment normal.       Labs   Most recent labs reviewed   Lab Results   Component Value Date    SODIUM 138 05/31/2024    K 4.0 05/31/2024     05/31/2024    CO2 24 05/31/2024    AGAP 8 05/31/2024    BUN 9 05/31/2024    CREATININE 0.98 05/31/2024    GLUC 80 05/31/2024    GLUF 71 04/26/2024    CALCIUM 9.0 05/31/2024    AST 21 05/31/2024    ALT 25 05/31/2024    ALKPHOS 75 05/31/2024    TP 7.5 05/31/2024    TBILI 0.47 05/31/2024    EGFR 92 05/31/2024     Lab Results   Component Value Date    HGBA1C 5.5 04/26/2024     Lab Results   Component Value Date    SKF5GYEWJOZL 0.693 05/31/2024     Lab Results   Component Value Date    CHOLESTEROL 217 (H) 04/26/2024     Lab Results   Component Value Date    HDL 37 (L) 04/26/2024     Lab Results   Component Value Date    TRIG 170 (H) 04/26/2024     Lab Results   Component Value Date    LDLCALC 146 (H) 04/26/2024      "

## 2025-03-14 NOTE — TELEPHONE ENCOUNTER
PA for Zepbound SUBMITTED to [facility]    via    [x]CMM-KEY: [id number]  []Surescripts-Case ID #    []Availity-Auth ID #  NDC #    []Faxed to plan   []Other website    []Phone call Case ID #      []PA sent as URGENT    All office notes, labs and other pertaining documents and studies sent. Clinical questions answered. Awaiting determination from insurance company.     Turnaround time for your insurance to make a decision on your Prior Authorization can take 7-21 business days.

## 2025-03-14 NOTE — TELEPHONE ENCOUNTER
PA for Zepbound  APPROVED     Date(s) approved 3/14/2025-3/14/2026    Case #    Patient advised by          []Zenefitshart Message  []Phone call   [x]LMOM  []L/M to call office as no active Communication consent on file  []Unable to leave detailed message as VM not approved on Communication consent       Pharmacy advised by    []Fax  [x]Phone call  []Secure Chat    Specialty Pharmacy    []     Approval letter scanned into Media No -On covermymeds

## 2025-03-18 ENCOUNTER — OFFICE VISIT (OUTPATIENT)
Dept: PHYSICAL THERAPY | Facility: CLINIC | Age: 45
End: 2025-03-18
Payer: COMMERCIAL

## 2025-03-18 DIAGNOSIS — M75.102 LEFT ROTATOR CUFF TEAR ARTHROPATHY: Primary | ICD-10-CM

## 2025-03-18 DIAGNOSIS — M12.812 LEFT ROTATOR CUFF TEAR ARTHROPATHY: Primary | ICD-10-CM

## 2025-03-18 PROCEDURE — 97140 MANUAL THERAPY 1/> REGIONS: CPT | Performed by: PHYSICAL THERAPIST

## 2025-03-18 PROCEDURE — 97110 THERAPEUTIC EXERCISES: CPT | Performed by: PHYSICAL THERAPIST

## 2025-03-18 PROCEDURE — 97112 NEUROMUSCULAR REEDUCATION: CPT | Performed by: PHYSICAL THERAPIST

## 2025-03-18 NOTE — PROGRESS NOTES
"Daily Note     Today's date: 3/18/2025  Patient name: Rayne Irizarry  : 1980  MRN: [id number]  Referring provider: Prabha Su PA-C  Dx:   Encounter Diagnosis     ICD-10-CM    1. Left rotator cuff tear arthropathy  M75.102     M12.812                      Subjective: Patient stated pain level 5-6/10 prior to treatment session.       Objective: See treatment diary below      Assessment: Patient performed exercises without c/o pain; demonstrated moderate pain with manual shoulder PROM.       Plan: Continue per plan of care.      Precautions:  Sling until 25  CO-MORBIDITIES:  L RTC repair 24  HEP ACCESS CODE:   FOTO Completed On:     POC Expires Reeval for Medicare to be completed Unit LImit Auth Expiration Date PT/OT/STVisit Limit   25 By visit  N/A N/A N/A BOMN                       TREATMENT DIARY  Auth Status DATE 3/18 2/18 2/20 2/25 2/27 3/4 3/11 3/13   NA Visit # 15 8 9 10 11 12 13 14    Remain           MANUAL THERAPY           PROM L shoulder 10' 12' 12' 10' 8' 8' 8' 8'                         THERAPEUTIC EXERCISE HEP           AROM L elbow flex/ext  3# 20 2# 20 2# 20 2# 20 2# 20 3# 20 3# 20 3# 20   Standing B shoulder flex  3# 20   NV 20 1# 20 1# 20 1# 20   Standing B scaptions  3# 20   NV 20 1# 20 1# 20 1# 20   L digiflex  Black 30 Black digiflex 30 Black 30 Black 30 Black 30 Black 30 Black 30    Cross body stretch into L shoulder horiz adduction 3/11           Prone \"T\" L  1# 20   NV 20 1# 20 1# 20 1# 20   Prone \"Y\" L  1# 20   NV 20 1# 20 1# 20 1# 20   Prone rows L  3# 20 20 20 2# 20 2# 20 3# 20 3# 20 3# 20   Prone \"I\" L to hip height  3# 20 20 20 2# 20 2# 20 3# 20 3# 20 3# 20   S/L L shoulder ER  3# 20 20 20 2# 20 2# 20 3# 20 3# 20 3# 20   Table slides L shoulder flex + scap   20 ea D/C        Strap behind back stretch into IR L  10\" 10 10\" 10  10\" 10 10\" 10 10\" 10 10\" 10 10\" 10 10\" 10   Isometrics L shoulder ER, IR, protraction, retraction    NV 5\" 5 ea 5\" " "5 ea 5\" 5 ea 5\" 5 ea    Supine protraction L 2/13 4# medicine ball 20  2# 20 2# 20 2# 20 2# 20 3# 20 4# medicine ball 20 4# medicine ball 20    Standing cane AAROM L shoulder flex, ext, ER 2/13 10 ea 10 ea 10 ea 10 ea 10 ea 10 ea 10 ea 10 ea   NEUROMUSCULAR REEDUCATION                                                             THERAPEUTIC ACTIVITY            Flexbar sup/pron  Green 20 ea Red 20 ea Red 20 ea Red 20 ea Red 20 ea Green 20 ea Green 20 ea Green 20 ea   Flexbar L wrist flex/ext  Green 20 ea Red 20 ea Red 20 ea Red 20 ea Red 20 ea Green 20 ea Green 20 ea Green 20 ea   Wall wash L shoulder flex + scap 2/20   20 ea 20 ea 20 ea 20 ea 20 ea 20ea   Wall push ups    NV 20 20 20 20 20   OH cone stacking L (5 cones) flex and scap       3x - flex only 2x ea 2xea   GAIT TRAINING                                                            MODALITIES           CP L shoulder sitting 5'     10' 10' 10'                                        "

## 2025-03-20 ENCOUNTER — APPOINTMENT (OUTPATIENT)
Dept: PHYSICAL THERAPY | Facility: CLINIC | Age: 45
End: 2025-03-20
Payer: COMMERCIAL

## 2025-03-25 ENCOUNTER — OFFICE VISIT (OUTPATIENT)
Dept: PHYSICAL THERAPY | Facility: CLINIC | Age: 45
End: 2025-03-25
Payer: COMMERCIAL

## 2025-03-25 DIAGNOSIS — M12.812 LEFT ROTATOR CUFF TEAR ARTHROPATHY: Primary | ICD-10-CM

## 2025-03-25 DIAGNOSIS — M75.102 LEFT ROTATOR CUFF TEAR ARTHROPATHY: Primary | ICD-10-CM

## 2025-03-25 PROCEDURE — 97530 THERAPEUTIC ACTIVITIES: CPT | Performed by: PHYSICAL THERAPIST

## 2025-03-25 PROCEDURE — 97110 THERAPEUTIC EXERCISES: CPT | Performed by: PHYSICAL THERAPIST

## 2025-03-25 NOTE — PROGRESS NOTES
"Daily Note     Today's date: 3/25/2025  Patient name: Rayne Irizarry  : 1980  MRN: [id number]  Referring provider: Prabha Su PA-C  Dx:   Encounter Diagnosis     ICD-10-CM    1. Left rotator cuff tear arthropathy  M75.102     M12.812                Subjective:  Pt reports he feels much less pain today.       Objective:  See treatment diary below      Assessment:  Pt is progressing well and tolerated all added strengthening/endurance tasks well aside from resistive scap motions due to moderate L shoulder pain.  His L shoulder AROM is very good to head height, but weak in OH motions.  PROM is excellent.  He will continue to benefit from skilled PT to maximize L UE function.       Plan:  Continue to progress ot OH strengthening.  Attempt eccentric lowering from OH levels       Precautions:  Sling until 25  CO-MORBIDITIES:  L RTC repair 24  HEP ACCESS CODE:   FOTO Completed On:     POC Expires Reeval for Medicare to be completed Unit LImit Auth Expiration Date PT/OT/STVisit Limit   25 By visit  N/A N/A N/A BOMN                       TREATMENT DIARY  Auth Status DATE 3/18 3/25   NA Visit # 15 16    Remain     MANUAL THERAPY     PROM L shoulder 10' 8'             THERAPEUTIC EXERCISE HEP     AROM L elbow flex/ext  3# 20 4# 20   Standing B shoulder flex  3# 20 3# 20   Standing B scaptions  3# 20 3# 20   L digiflex  Black 30 Black 30   TB L shoulder ER 3/11  Blue 20   TB L shoulder IR   Blue 20   Prone \"T\" L  1# 20 2# 20   Prone \"Y\" L  1# 20 2# 20   Prone rows L  3# 20 4# 20   Prone \"I\" L   3# 20 4# 20   S/L L shoulder ER  3# 20 3# 20   Strap behind back stretch into IR L  10\" 10 10\" 10   TB rows B   Blue 20   Supine protraction L with medicine ball  4# medicine ball 20  4# 20   Standing cane AAROM L shoulder flex, ext, ER  10 ea 10 ea   NEUROMUSCULAR REEDUCATION                               THERAPEUTIC ACTIVITY      Flexbar sup/pron  Green 20 ea Green 20 ea   Flexbar " L wrist flex/ext  Green 20 ea Green 20 ea   Standing UBE   L3 2'/2'   Wall wash L shoulder flex + scap 2/20  2# 20 ea   Wall push ups   20   OH cone stacking L (5 cones) flex and scap   2#, 0#  2x ea   GAIT TRAINING                              MODALITIES     CP L shoulder sitting 5' 5'

## 2025-03-27 ENCOUNTER — OFFICE VISIT (OUTPATIENT)
Dept: PHYSICAL THERAPY | Facility: CLINIC | Age: 45
End: 2025-03-27
Payer: COMMERCIAL

## 2025-03-27 DIAGNOSIS — M75.102 LEFT ROTATOR CUFF TEAR ARTHROPATHY: Primary | ICD-10-CM

## 2025-03-27 DIAGNOSIS — M12.812 LEFT ROTATOR CUFF TEAR ARTHROPATHY: Primary | ICD-10-CM

## 2025-03-27 PROCEDURE — 97110 THERAPEUTIC EXERCISES: CPT | Performed by: PHYSICAL THERAPIST

## 2025-03-27 PROCEDURE — 97530 THERAPEUTIC ACTIVITIES: CPT | Performed by: PHYSICAL THERAPIST

## 2025-03-27 NOTE — PROGRESS NOTES
"Daily Note     Today's date: 3/27/2025  Patient name: Rayne Irizarry  : 1980  MRN: [id number]  Referring provider: Prabha Su PA-C  Dx:   Encounter Diagnosis     ICD-10-CM    1. Left rotator cuff tear arthropathy  M75.102     M12.812                Subjective:  Pt reports he had moderate global L shoulder soreness after last PT session with added exercises.         Objective:  See treatment diary below      Assessment:  Pt is progressing well and tolerated all added strengthening/endurance tasks well aside from resistive scap motions due to moderate L shoulder pain.  Reduction in scaption plane weights helped to greatly reduce discomfort of L shoulder today.  Good tolerance to added eccentrics.  PROM is excellent, but with some endrange ER>flex pain.  He will continue to benefit from skilled PT to maximize L UE function.       Plan:  Continue to progress strengthening especially in OH motions.  Add medicine ball circles CW/CCW in supine next session.       Precautions:  Sling until 25  CO-MORBIDITIES:  L RTC repair 24  HEP ACCESS CODE:   FOTO Completed On:     POC Expires Reeval for Medicare to be completed Unit LImit Auth Expiration Date PT/OT/STVisit Limit   25 By visit  N/A N/A N/A BOMN                       TREATMENT DIARY  Auth Status DATE 3/18 3/25 3/27   NA Visit # 15 16 17    Remain      MANUAL THERAPY      PROM L shoulder 10' 8' 6'               THERAPEUTIC EXERCISE HEP      AROM L elbow flex/ext  3# 20 4# 20 4# 20   Standing B shoulder flex  3# 20 3# 20 3# 20   Standing B scaptions  3# 20 3# 20 2# 20   L digiflex  Black 30 Black 30 Black 30   TB L shoulder ER 3/11  Blue 20 Blue 20   TB L shoulder IR   Blue 20 Blue 20   Prone \"T\" L  1# 20 2# 20 2# 20   Prone \"Y\" L  1# 20 2# 20 2# 20   Prone rows L  3# 20 4# 20 4# 20   Prone \"I\" L   3# 20 4# 20 4# 20   S/L L shoulder ER  3# 20 3# 20 3# 20   Strap behind back stretch into IR L  10\" 10 10\" 10 10\" 10   TB rows B   " Blue 20 Blue 20   Eccentric lowering from OH L 3/27  NV 2# 20   Supine protraction L with medicine ball 2/13 4# medicine ball 20  4# 20 4# 20   Standing cane AAROM L shoulder flex, ext, ER 2/13 10 ea 10 ea 10 ea   NEUROMUSCULAR REEDUCATION                                    THERAPEUTIC ACTIVITY       Flexbar sup/pron  Green 20 ea Green 20 ea Green 20 ea   Flexbar L wrist flex/ext  Green 20 ea Green 20 ea Green 20 ea   Standing UBE   L3 2'/2' L# 2'/2'   Wall wash L shoulder flex + scap 2/20  2# 20 ea 2# 20 flex, 0# 20 abd   Wall push ups   20 20   OH cone stacking L (5 cones) flex and scap   2#, 0#  2x ea 2#, 0# 2x ea   GAIT TRAINING                                   MODALITIES      CP L shoulder sitting 5' 5' 5'

## 2025-04-01 ENCOUNTER — OFFICE VISIT (OUTPATIENT)
Dept: PHYSICAL THERAPY | Facility: CLINIC | Age: 45
End: 2025-04-01
Payer: COMMERCIAL

## 2025-04-01 DIAGNOSIS — M75.102 LEFT ROTATOR CUFF TEAR ARTHROPATHY: Primary | ICD-10-CM

## 2025-04-01 DIAGNOSIS — M12.812 LEFT ROTATOR CUFF TEAR ARTHROPATHY: Primary | ICD-10-CM

## 2025-04-01 PROCEDURE — 97110 THERAPEUTIC EXERCISES: CPT | Performed by: PHYSICAL THERAPIST

## 2025-04-01 PROCEDURE — 97530 THERAPEUTIC ACTIVITIES: CPT | Performed by: PHYSICAL THERAPIST

## 2025-04-01 NOTE — PROGRESS NOTES
"Daily Note     Today's date: 2025  Patient name: Rayne Irizarry  : 1980  MRN: [id number]  Referring provider: Prabha Su PA-C  Dx:   Encounter Diagnosis     ICD-10-CM    1. Left rotator cuff tear arthropathy  M75.102     M12.812                Subjective:  Pt reports still having pain with L shoulder abduction and sometimes shoulder extension.           Objective:  See treatment diary below      Assessment:  Pt is having an unusual amount of L shoulder pain at 3 1/2 months post-op.  His AROM is very good despite the discomfort.  He continues to have an increase in scapular plane motions.  PROM is only restricted in endrange ER now due to pain, not tightness.  No issue with added strengthening today in non-abduction plane motions.  He will continue to benefit from skilled PT to maximize L UE function.        Plan:  Continue to progress strengthening especially in OH motions as pain level lessens with light weights in scap plane.       Precautions:  Sling until 25  CO-MORBIDITIES:  L RTC repair 24  HEP ACCESS CODE:   FOTO Completed On:     POC Expires Reeval for Medicare to be completed Unit LImit Auth Expiration Date PT/OT/STVisit Limit   25 By visit  N/A N/A N/A BOMN                       TREATMENT DIARY  Auth Status DATE 3/18 3/25 3/27 4/1   NA Visit # 15 16 17 18    Remain       MANUAL THERAPY       PROM L shoulder 10' 8' 6' 5'                 THERAPEUTIC EXERCISE HEP       AROM L elbow flex/ext  3# 20 4# 20 4# 20 4# 20   Standing B shoulder flex  3# 20 3# 20 3# 20 3# 20   Standing B scaptions  3# 20 3# 20 2# 20 0# 20   L digiflex  Black 30 Black 30 Black 30 Black 30   TB L shoulder ER 3/11  Blue 20 Blue 20 Blue 20   TB L shoulder IR   Blue 20 Blue 20 Blue 20   Prone \"T\" L  1# 20 2# 20 2# 20 2# 20   Prone \"Y\" L  1# 20 2# 20 2# 20 2# 20   Prone rows L  3# 20 4# 20 4# 20 4# 20   Prone \"I\" L   3# 20 4# 20 4# 20 4# 20   S/L L shoulder ER  3# 20 3# 20 3# 20 3# 20   Karianp " "behind back stretch into IR L 2/13 10\" 10 10\" 10 10\" 10 10\" 10   TB rows B   Blue 20 Blue 20 Plum 20   Eccentric lowering from OH L 3/27  NV 2# 20 Held - NV   Supine medicine ball circles in supine CW/CCW L     4# 20 ea   Supine protraction L with medicine ball 2/13 4# medicine ball 20  4# 20 4# 20 4# 20   Standing cane AAROM L shoulder flex, ext, ER 2/13 10 ea 10 ea 10 ea 10 ea   NEUROMUSCULAR REEDUCATION                                         THERAPEUTIC ACTIVITY        Flexbar sup/pron  Green 20 ea Green 20 ea Green 20 ea Green 20 ea   Flexbar L wrist flex/ext  Green 20 ea Green 20 ea Green 20 ea Green 20 ea   [place] L (flexbar OH)     Red 15\" 3   Standing UBE   L3 2'/2' L3 2'/2' L3 2'/2'   Wall wash L shoulder flex + scap 2/20  2# 20 ea 2# 20 flex, 0# 20 abd 2# 20 flex, 0# 20 abd   Wall push ups   20 20 20   OH cone stacking L (5 cones) flex and scap   2#, 0#  2x ea 2#, 0# 2x ea 2#, 0# 2x ea   GAIT TRAINING                                        MODALITIES       CP L shoulder sitting 5' 5' 5' 5'                                    "

## 2025-04-03 ENCOUNTER — APPOINTMENT (OUTPATIENT)
Dept: PHYSICAL THERAPY | Facility: CLINIC | Age: 45
End: 2025-04-03
Payer: COMMERCIAL

## 2025-04-08 ENCOUNTER — OFFICE VISIT (OUTPATIENT)
Dept: PHYSICAL THERAPY | Facility: CLINIC | Age: 45
End: 2025-04-08
Payer: COMMERCIAL

## 2025-04-08 DIAGNOSIS — M75.102 LEFT ROTATOR CUFF TEAR ARTHROPATHY: Primary | ICD-10-CM

## 2025-04-08 DIAGNOSIS — M12.812 LEFT ROTATOR CUFF TEAR ARTHROPATHY: Primary | ICD-10-CM

## 2025-04-08 PROCEDURE — 97110 THERAPEUTIC EXERCISES: CPT | Performed by: PHYSICAL THERAPIST

## 2025-04-08 PROCEDURE — 97530 THERAPEUTIC ACTIVITIES: CPT | Performed by: PHYSICAL THERAPIST

## 2025-04-08 NOTE — PROGRESS NOTES
"Daily Note     Today's date: 2025  Patient name: Rayne Irizarry  : 1980  MRN: [id number]  Referring provider: Prabha Su PA-C  Dx:   Encounter Diagnosis     ICD-10-CM    1. Left rotator cuff tear arthropathy  M75.102     M12.812                Subjective:  Pt reports his L shoulder has been less painful this week, but taking pain killers occasionally.            Objective:  See treatment diary below      Assessment:  Pt is had less pain today in all motions and was able to tolerate some weights for abduction motions for the first time in weeks without significant pain.  His AROM is very good.  PROM is only restricted in endrange ER now due to pain, not tightness and only in the last 5 degrees.  He will continue to benefit from skilled PT to maximize L UE function.        Plan:  Continue to progress strengthening especially in OH motions as pain level lessens with heavier weights in scap plane.       Precautions:  Sling until 25  CO-MORBIDITIES:  L RTC repair 24  HEP ACCESS CODE:   FOTO Completed On:     POC Expires Reeval for Medicare to be completed Unit LImit Auth Expiration Date PT/OT/STVisit Limit   25 By visit  N/A N/A N/A BOMN                       TREATMENT DIARY  Auth Status DATE 3/18 3/25 3/27 4/1 4/8   NA Visit # 15 16 17 18 19    Remain        MANUAL THERAPY        PROM L shoulder 10' 8' 6' 5' 5'                   THERAPEUTIC EXERCISE HEP        AROM L elbow flex/ext  3# 20 4# 20 4# 20 4# 20 5# 20   Standing B shoulder flex  3# 20 3# 20 3# 20 3# 20 3# 20   Standing B scaptions  3# 20 3# 20 2# 20 0# 20 1# 20   L digiflex  Black 30 Black 30 Black 30 Black 30 D/C   TB L shoulder ER 3/11  Blue 20 Blue 20 Blue 20 Blue 20   TB L shoulder IR   Blue 20 Blue 20 Blue 20 Blue 20   Prone \"T\" L  1# 20 2# 20 2# 20 2# 20 2# 20   Prone \"Y\" L  1# 20 2# 20 2# 20 2# 20 2# 20   Prone rows L  3# 20 4# 20 4# 20 4# 20 5# 20   Prone \"I\" L   3# 20 4# 20 4# 20 4# 20 4# 20   S/L L shoulder " "ER 2/18 3# 20 3# 20 3# 20 3# 20 3# 20   Strap behind back stretch into IR L 2/13 10\" 10 10\" 10 10\" 10 10\" 10 10\" 10   TB rows B   Blue 20 Blue 20 Plum 20 Anton 20   Wall slides OH with TB horiz abd B      OTB 20   Eccentric lowering from OH L 3/27  NV 2# 20 Held - NV    Supine medicine ball circles in supine CW/CCW L     4# 20 ea 4# 20 ea   Supine protraction L with medicine ball 2/13 4# medicine ball 20  4# 20 4# 20 4# 20 4# 20   Standing cane AAROM L shoulder flex, ext, ER 2/13 10 ea 10 ea 10 ea 10 ea 10 ea   NEUROMUSCULAR REEDUCATION          Standing protraction into wall at head height CW/CCW circles L      20 ea                              THERAPEUTIC ACTIVITY         Flexbar sup/pron  Green 20 ea Green 20 ea Green 20 ea Green 20 ea D/C   Flexbar L wrist flex/ext  Green 20 ea Green 20 ea Green 20 ea Green 20 ea D/C   Statue of [place] L (flexbar OH)     Red 15\" 3 Red 15\" 5   Standing UBE   L3 2'/2' L3 2'/2' L3 2'/2' L3 2'/2'   Wall wash L shoulder flex + scap 2/20  2# 20 ea 2# 20 flex, 0# 20 abd 2# 20 flex, 0# 20 abd 2# 20 flex, 0# abd   Wall push ups   20 20 20 20   OH cone stacking L (5 cones) flex and scap   2#, 0#  2x ea 2#, 0# 2x ea 2#, 0# 2x ea 2# flex, 0# 20 ea   GAIT TRAINING                                             MODALITIES        CP L shoulder sitting 5' 5' 5' 5' 5'                                     "

## 2025-04-10 ENCOUNTER — APPOINTMENT (OUTPATIENT)
Dept: PHYSICAL THERAPY | Facility: CLINIC | Age: 45
End: 2025-04-10
Payer: COMMERCIAL

## 2025-04-14 ENCOUNTER — APPOINTMENT (OUTPATIENT)
Dept: PHYSICAL THERAPY | Facility: CLINIC | Age: 45
End: 2025-04-14
Payer: COMMERCIAL

## 2025-04-15 ENCOUNTER — TELEPHONE (OUTPATIENT)
Age: 45
End: 2025-04-15

## 2025-04-15 DIAGNOSIS — E66.3 OVERWEIGHT (BMI 25.0-29.9): ICD-10-CM

## 2025-04-15 DIAGNOSIS — R73.01 IMPAIRED FASTING GLUCOSE: ICD-10-CM

## 2025-04-15 DIAGNOSIS — E78.5 DYSLIPIDEMIA: Primary | ICD-10-CM

## 2025-04-15 RX ORDER — TIRZEPATIDE 5 MG/.5ML
5 INJECTION, SOLUTION SUBCUTANEOUS WEEKLY
Qty: 2 ML | Refills: 2 | Status: SHIPPED | OUTPATIENT
Start: 2025-04-15 | End: 2025-07-08

## 2025-04-15 NOTE — TELEPHONE ENCOUNTER
How are you tolerating the medication?   [] Nausea  [] Vomiting  [] Diarrhea  [x] Asymptomatic  [] Other:    Last visit weight: 195    Current weight: 187    Date of last injection: 4/10/25    How many injections do you have left: 0    Patient requests a refill of Zepbound 2.5 mg be sent to [facility]. He is due for the next dose on Thursday 4/17/25. Patient is requesting a call back when the script is sent to the pharmacy.

## 2025-04-16 ENCOUNTER — OFFICE VISIT (OUTPATIENT)
Dept: PHYSICAL THERAPY | Facility: CLINIC | Age: 45
End: 2025-04-16
Payer: COMMERCIAL

## 2025-04-16 DIAGNOSIS — M75.102 LEFT ROTATOR CUFF TEAR ARTHROPATHY: Primary | ICD-10-CM

## 2025-04-16 DIAGNOSIS — M12.812 LEFT ROTATOR CUFF TEAR ARTHROPATHY: Primary | ICD-10-CM

## 2025-04-16 PROCEDURE — 97530 THERAPEUTIC ACTIVITIES: CPT | Performed by: PHYSICAL THERAPIST

## 2025-04-16 PROCEDURE — 97110 THERAPEUTIC EXERCISES: CPT | Performed by: PHYSICAL THERAPIST

## 2025-04-16 NOTE — PROGRESS NOTES
"Daily Note     Today's date: 2025  Patient name: Rayne Irizarry  : 1980  MRN: [id number]  Referring provider: Prabha Su PA-C  Dx:   Encounter Diagnosis     ICD-10-CM    1. Left rotator cuff tear arthropathy  M75.102     M12.812                Subjective:  Pt reports his L shoulder still hurts at times, but overall not as bad.              Objective:  See treatment diary below      Assessment:  Pt is had less pain today in all motions even with increased weights.  He achieved full PROM in all planes for the first time with endrange pain only in ER/IR.  He will continue to benefit from skilled PT to maximize L UE function.        Plan:  Continue to progress strengthening especially in OH motions as pain level lessens with heavier weights in scap plane.  Add OH KB carry on 25.       Precautions:  Sling until 25  CO-MORBIDITIES:  L RTC repair 24  HEP ACCESS CODE:   FOTO Completed On:     POC Expires Reeval for Medicare to be completed Unit LImit Auth Expiration Date PT/OT/STVisit Limit   25 By visit  N/A N/A N/A BOMN                       TREATMENT DIARY  Auth Status DATE 3/18 3/25 3/27 4/1 4/8 4/16   NA Visit # 15 16 17 18 19 20    Remain         MANUAL THERAPY         PROM L shoulder 10' 8' 6' 5' 5' 5'                     THERAPEUTIC EXERCISE HEP         AROM L elbow flex/ext  3# 20 4# 20 4# 20 4# 20 5# 20 5# 20   Standing B shoulder flex  3# 20 3# 20 3# 20 3# 20 3# 20 3# 20   Standing B scaptions  3# 20 3# 20 2# 20 0# 20 1# 20 3# 20   L digiflex  Black 30 Black 30 Black 30 Black 30 D/C    TB L shoulder ER 3/11  Blue 20 Blue 20 Blue 20 Blue 20 Blue 20   TB L shoulder IR   Blue 20 Blue 20 Blue 20 Blue 20 Blue 20   Prone \"T\" L  1# 20 2# 20 2# 20 2# 20 2# 20 3# 20   Prone \"Y\" L  1# 20 2# 20 2# 20 2# 20 2# 20 3# 20   Prone rows L  3# 20 4# 20 4# 20 4# 20 5# 20 5# 20   Prone \"I\" L  2 3# 20 4# 20 4# 20 4# 20 4# 20 4# 20   S/L L shoulder ER  3# 20 3# 20 3# 20 3# 20 3# 20 " "3# 20   Strap behind back stretch into IR L 2/13 10\" 10 10\" 10 10\" 10 10\" 10 10\" 10 10\" 10   TB rows B   Blue 20 Blue 20 Plum 20 Plum 20 Plum 20   Wall slides OH with TB horiz abd B      OTB 20 OTB 20   Eccentric lowering from OH L 3/27  NV 2# 20 Held - NV     Supine medicine ball circles in supine CW/CCW L     4# 20 ea 4# 20 ea 4# 20 ea   Supine protraction L with medicine ball 2/13 4# medicine ball 20  4# 20 4# 20 4# 20 4# 20 4# 20   Standing cane AAROM L shoulder flex, ext, ER 2/13 10 ea 10 ea 10 ea 10 ea 10 ea 10 ea   NEUROMUSCULAR REEDUCATION           Standing protraction into wall at head height CW/CCW circles L      20 ea 20 ea                                 THERAPEUTIC ACTIVITY          Flexbar sup/pron  Green 20 ea Green 20 ea Green 20 ea Green 20 ea D/C    Flexbar L wrist flex/ext  Green 20 ea Green 20 ea Green 20 ea Green 20 ea D/C    Statue of liberty L (flexbar OH)     Red 15\" 3 Red 15\" 5 Red 15\"5    Standing UBE   L3 2'/2' L3 2'/2' L3 2'/2' L3 2'/2' L3 2'/2'   Wall wash L shoulder flex + scap 2/20  2# 20 ea 2# 20 flex, 0# 20 abd 2# 20 flex, 0# 20 abd 2# 20 flex, 0# abd 2# 20 flex, 0# abd   OH KB carry L       NV   Wall push ups   20 20 20 20 20   OH cone stacking L (5 cones) flex and scap   2#, 0#  2x ea 2#, 0# 2x ea 2#, 0# 2x ea 2# flex, 0# 20 ea 2# 20 flex, 20 abd   GAIT TRAINING                                                  MODALITIES         CP L shoulder sitting 5' 5' 5' 5' 5' held                                      "

## 2025-04-21 ENCOUNTER — OFFICE VISIT (OUTPATIENT)
Dept: PHYSICAL THERAPY | Facility: CLINIC | Age: 45
End: 2025-04-21
Payer: COMMERCIAL

## 2025-04-21 DIAGNOSIS — M12.812 LEFT ROTATOR CUFF TEAR ARTHROPATHY: Primary | ICD-10-CM

## 2025-04-21 DIAGNOSIS — M75.102 LEFT ROTATOR CUFF TEAR ARTHROPATHY: Primary | ICD-10-CM

## 2025-04-21 PROCEDURE — 97530 THERAPEUTIC ACTIVITIES: CPT | Performed by: PHYSICAL THERAPIST

## 2025-04-21 PROCEDURE — 97110 THERAPEUTIC EXERCISES: CPT | Performed by: PHYSICAL THERAPIST

## 2025-04-21 NOTE — PROGRESS NOTES
"Daily Note     Today's date: 2025  Patient name: Rayne Irizarry  : 1980  MRN: [id number]  Referring provider: Prabha Su PA-C  Dx:   Encounter Diagnosis     ICD-10-CM    1. Left rotator cuff tear arthropathy  M75.102     M12.812                Subjective:  Pt reports his L shoulder is getting better.  Pain is lessening.           Objective:  See treatment diary below      Assessment:  Pt is having less pain this week with better strength and less pain in scapular plane.  No issues with increased weights, but mod fatigue with OH KB carry.  His L shoulder PROM in all planes is now full.  He will continue to benefit from skilled PT to maximize L UE function.        Plan:  Add more reps with OH KB carry next session.        Precautions:  Sling until 25  CO-MORBIDITIES:  L RTC repair 24  HEP ACCESS CODE:   FOTO Completed On:     POC Expires Reeval for Medicare to be completed Unit LImit Auth Expiration Date PT/OT/STVisit Limit   25 By visit  N/A N/A N/A BOMN                       TREATMENT DIARY  Auth Status DATE 3/18 3/25 3/27 4/1 4/8 4/16 4/21   NA Visit # 15 16 17 18 19 20 21    Remain          MANUAL THERAPY          PROM L shoulder 10' 8' 6' 5' 5' 5' 5'                       THERAPEUTIC EXERCISE HEP          AROM L elbow flex/ext  3# 20 4# 20 4# 20 4# 20 5# 20 5# 20 5# 20   Standing B shoulder flex  3# 20 3# 20 3# 20 3# 20 3# 20 3# 20 3# 20    Standing B scaptions  3# 20 3# 20 2# 20 0# 20 1# 20 3# 20 3# 20   L digiflex  Black 30 Black 30 Black 30 Black 30 D/C     TB L shoulder ER 3/11  Blue 20 Blue 20 Blue 20 Blue 20 Blue 20 Blue 20   TB L shoulder IR   Blue 20 Blue 20 Blue 20 Blue 20 Blue 20 Blue 20   Prone \"T\" L  1# 20 2# 20 2# 20 2# 20 2# 20 3# 20 3# 20   Prone \"Y\" L  1# 20 2# 20 2# 20 2# 20 2# 20 3# 20 3# 20   Prone rows L  3# 20 4# 20 4# 20 4# 20 5# 20 5# 20 5# 20   Prone \"I\" L   3# 20 4# 20 4# 20 4# 20 4# 20 4# 20 5# 20   S/L L shoulder ER  3# 20 3# 20 3# 20 3# " "20 3# 20 3# 20 3# 20   Strap behind back stretch into IR L 2/13 10\" 10 10\" 10 10\" 10 10\" 10 10\" 10 10\" 10 10\" 10   TB rows B   Blue 20 Blue 20 Plum 20 Plum 20 Plum 20 Plum 20   Wall slides OH with TB horiz abd B      OTB 20 OTB 20 OTB 20   Eccentric lowering from OH L 3/27  NV 2# 20 Held - NV      Supine medicine ball circles in supine CW/CCW L     4# 20 ea 4# 20 ea 4# 20 ea 4# 20 ea   Supine protraction L with medicine ball 2/13 4# medicine ball 20  4# 20 4# 20 4# 20 4# 20 4# 20 4# 20   Standing cane AAROM L shoulder flex, ext, ER 2/13 10 ea 10 ea 10 ea 10 ea 10 ea 10 ea 10 ea   NEUROMUSCULAR REEDUCATION            Standing protraction into wall at head height CW/CCW circles L      20 ea 20 ea 20 ea                                    THERAPEUTIC ACTIVITY           Flexbar sup/pron  Green 20 ea Green 20 ea Green 20 ea Green 20 ea D/C     Flexbar L wrist flex/ext  Green 20 ea Green 20 ea Green 20 ea Green 20 ea D/C     Statue of liberty L (flexbar OH)     Red 15\" 3 Red 15\" 5 Red 15\" 5  Red 15\" 5   Standing UBE   L3 2'/2' L3 2'/2' L3 2'/2' L3 2'/2' L3 2'/2' L3 2'/2'   Wall wash L shoulder flex + scap 2/20  2# 20 ea 2# 20 flex, 0# 20 abd 2# 20 flex, 0# 20 abd 2# 20 flex, 0# abd 2# 20 flex, 0# abd 2# 20 ea   OH KB carry L       NV 5# 2 laps x 24 feet   Wall push ups   20 20 20 20 20 20   OH cone stacking L (5 cones) flex and scap   2#, 0#  2x ea 2#, 0# 2x ea 2#, 0# 2x ea 2# flex, 0# 20 ea 2# 20 flex, 20 abd 2# 20 ea   GAIT TRAINING                                                       MODALITIES          CP L shoulder sitting 5' 5' 5' 5' 5' held 5'                                       "

## 2025-04-23 ENCOUNTER — OFFICE VISIT (OUTPATIENT)
Dept: PHYSICAL THERAPY | Facility: CLINIC | Age: 45
End: 2025-04-23
Payer: COMMERCIAL

## 2025-04-23 DIAGNOSIS — M12.812 LEFT ROTATOR CUFF TEAR ARTHROPATHY: Primary | ICD-10-CM

## 2025-04-23 DIAGNOSIS — M75.102 LEFT ROTATOR CUFF TEAR ARTHROPATHY: Primary | ICD-10-CM

## 2025-04-23 PROCEDURE — 97530 THERAPEUTIC ACTIVITIES: CPT | Performed by: PHYSICAL THERAPIST

## 2025-04-23 PROCEDURE — 97110 THERAPEUTIC EXERCISES: CPT | Performed by: PHYSICAL THERAPIST

## 2025-04-23 NOTE — PROGRESS NOTES
"Daily Note     Today's date: 2025  Patient name: Rayne Irizarry  : 1980  MRN: [id number]  Referring provider: Prabha Su PA-C  Dx:   No diagnosis found.           Subjective:  Pt reports his L shoulder is getting better.  Pain is lessening.           Objective:  See treatment diary below      Assessment:  Pt is having less pain this week with better strength and less pain in scapular plane.  No issues with increased weights, but mod fatigue with OH KB carry.  His L shoulder PROM in all planes is now full.  He will continue to benefit from skilled PT to maximize L UE function.        Plan:  Add more reps with OH KB carry next session.        Precautions:  Sling until 25  CO-MORBIDITIES:  L RTC repair 24  HEP ACCESS CODE:   FOTO Completed On:     POC Expires Reeval for Medicare to be completed Unit LImit Auth Expiration Date PT/OT/STVisit Limit   25 By visit  N/A N/A N/A BOMN                       TREATMENT DIARY  Auth Status DATE 3/18 3/25 3/27 4/1 4/8 4/16 4/21 4/23   NA Visit # 15 16 17 18 19 20 21 22    Remain           MANUAL THERAPY           PROM L shoulder 10' 8' 6' 5' 5' 5' 5'                          THERAPEUTIC EXERCISE HEP           AROM L elbow flex/ext  3# 20 4# 20 4# 20 4# 20 5# 20 5# 20 5# 20    Standing B shoulder flex  3# 20 3# 20 3# 20 3# 20 3# 20 3# 20 3# 20     Standing B scaptions  3# 20 3# 20 2# 20 0# 20 1# 20 3# 20 3# 20    L digiflex  Black 30 Black 30 Black 30 Black 30 D/C      TB L shoulder ER 3/11  Blue 20 Blue 20 Blue 20 Blue 20 Blue 20 Blue 20    TB L shoulder IR   Blue 20 Blue 20 Blue 20 Blue 20 Blue 20 Blue 20    Prone \"T\" L  1# 20 2# 20 2# 20 2# 20 2# 20 3# 20 3# 20    Prone \"Y\" L  1# 20 2# 20 2# 20 2# 20 2# 20 3# 20 3# 20    Prone rows L  3# 20 4# 20 4# 20 4# 20 5# 20 5# 20 5# 20    Prone \"I\" L   3# 20 4# 20 4# 20 4# 20 4# 20 4# 20 5# 20    S/L L shoulder ER  3# 20 3# 20 3# 20 3# 20 3# 20 3# 20 3# 20    Strap behind back stretch into IR " "L 2/13 10\" 10 10\" 10 10\" 10 10\" 10 10\" 10 10\" 10 10\" 10    TB rows B   Blue 20 Blue 20 Plum 20 Plum 20 Plum 20 Plum 20    Wall slides OH with TB horiz abd B      OTB 20 OTB 20 OTB 20    Eccentric lowering from OH L 3/27  NV 2# 20 Held - NV       Supine medicine ball circles in supine CW/CCW L     4# 20 ea 4# 20 ea 4# 20 ea 4# 20 ea    Supine protraction L with medicine ball 2/13 4# medicine ball 20  4# 20 4# 20 4# 20 4# 20 4# 20 4# 20    Standing cane AAROM L shoulder flex, ext, ER 2/13 10 ea 10 ea 10 ea 10 ea 10 ea 10 ea 10 ea    NEUROMUSCULAR REEDUCATION             Standing protraction into wall at head height CW/CCW circles L      20 ea 20 ea 20 ea                                        THERAPEUTIC ACTIVITY            Flexbar sup/pron  Green 20 ea Green 20 ea Green 20 ea Green 20 ea D/C      Flexbar L wrist flex/ext  Green 20 ea Green 20 ea Green 20 ea Green 20 ea D/C      Statue of liberty L (flexbar OH)     Red 15\" 3 Red 15\" 5 Red 15\" 5  Red 15\" 5    Standing UBE   L3 2'/2' L3 2'/2' L3 2'/2' L3 2'/2' L3 2'/2' L3 2'/2' L3 2'/2'   Wall wash L shoulder flex + scap 2/20  2# 20 ea 2# 20 flex, 0# 20 abd 2# 20 flex, 0# 20 abd 2# 20 flex, 0# abd 2# 20 flex, 0# abd 2# 20 ea    OH KB carry L       NV 5# 2 laps x 24 feet    Wall push ups   20 20 20 20 20 20    OH cone stacking L (5 cones) flex and scap   2#, 0#  2x ea 2#, 0# 2x ea 2#, 0# 2x ea 2# flex, 0# 20 ea 2# 20 flex, 20 abd 2# 20 ea    GAIT TRAINING                                                            MODALITIES           CP L shoulder sitting 5' 5' 5' 5' 5' held 5'                                         "

## 2025-04-23 NOTE — PROGRESS NOTES
"Daily Note     Today's date: 2025  Patient name: Rayne Irizarry  : 1980  MRN: [id number]  Referring provider: Prabha Su PA-C  Dx:   Encounter Diagnosis     ICD-10-CM    1. Left rotator cuff tear arthropathy  M75.102     M12.812                Subjective:  Pt reports his L shoulder is improving.  No issues after last session.            Objective:  See treatment diary below      Assessment:  Pt is having less pain recently with better strength and less pain in scapular plane.  No issues with added KB eccentric lowering aside from when at 90 degrees flexion.  His L shoulder PROM continues to be full in all planes.  He will continue to benefit from skilled PT to maximize L UE function.        Plan:  Add more reps with OH KB carry next session.        Precautions:  Sling until 25  CO-MORBIDITIES:  L RTC repair 24  HEP ACCESS CODE:   FOTO Completed On:     POC Expires Reeval for Medicare to be completed Unit LImit Auth Expiration Date PT/OT/STVisit Limit   25 By visit  N/A N/A N/A BOMN                       TREATMENT DIARY  Auth Status DATE 3/18 3/25 3/27 4/1 4/8 4/16 4/21 4/23   NA Visit # 15 16 17 18 19 20 21 22    Remain           MANUAL THERAPY           PROM L shoulder 10' 8' 6' 5' 5' 5' 5' 5'                         THERAPEUTIC EXERCISE HEP           AROM L elbow flex/ext  3# 20 4# 20 4# 20 4# 20 5# 20 5# 20 5# 20 5# 20   Standing B shoulder flex  3# 20 3# 20 3# 20 3# 20 3# 20 3# 20 3# 20  3# 20   Standing B scaptions  3# 20 3# 20 2# 20 0# 20 1# 20 3# 20 3# 20 3# 20   L digiflex  Black 30 Black 30 Black 30 Black 30 D/C      TB L shoulder ER 3/11  Blue 20 Blue 20 Blue 20 Blue 20 Blue 20 Blue 20 Blue 20   TB L shoulder IR   Blue 20 Blue 20 Blue 20 Blue 20 Blue 20 Blue 20 Blue 20   Prone \"T\" L  1# 20 2# 20 2# 20 2# 20 2# 20 3# 20 3# 20 3# 20   Prone \"Y\" L  1# 20 2# 20 2# 20 2# 20 2# 20 3# 20 3# 20 3# 20   Prone rows L /18 3# 20 4# 20 4# 20 4# 20 5# 20 5# 20 5# 20 5# 20   Prone \"I\" " "L  2/18 3# 20 4# 20 4# 20 4# 20 4# 20 4# 20 5# 20 5# 20   S/L L shoulder ER 2/18 3# 20 3# 20 3# 20 3# 20 3# 20 3# 20 3# 20 3# 20   Strap behind back stretch into IR L 2/13 10\" 10 10\" 10 10\" 10 10\" 10 10\" 10 10\" 10 10\" 10 10\" 10   TB rows B   Blue 20 Blue 20 Plum 20 Plum 20 Plum 20 Plum 20 Plum 20   Wall slides OH with TB horiz abd B      OTB 20 OTB 20 OTB 20 OTB 20   Eccentric lowering from OH L 3/27  NV 2# 20 Held - NV       Supine medicine ball circles in supine CW/CCW L     4# 20 ea 4# 20 ea 4# 20 ea 4# 20 ea 4# 20 ea   Supine protraction L with medicine ball 2/13 4# medicine ball 20  4# 20 4# 20 4# 20 4# 20 4# 20 4# 20 4# 20    Standing cane AAROM L shoulder flex, ext, ER 2/13 10 ea 10 ea 10 ea 10 ea 10 ea 10 ea 10 ea 10 ea   NEUROMUSCULAR REEDUCATION             Standing protraction into wall at head height CW/CCW circles L      20 ea 20 ea 20 ea 20 ea                                       THERAPEUTIC ACTIVITY            Flexbar sup/pron  Green 20 ea Green 20 ea Green 20 ea Green 20 ea D/C      Flexbar L wrist flex/ext  Green 20 ea Green 20 ea Green 20 ea Green 20 ea D/C      Statue of liberty L (flexbar OH)     Red 15\" 3 Red 15\" 5 Red 15\" 5  Red 15\" 5 Red 15\" 5   Standing UBE   L3 2'/2' L3 2'/2' L3 2'/2' L3 2'/2' L3 2'/2' L3 2'/2' L3 2'/2'   Eccentric lowering from OH L shoulder            Wall wash L shoulder flex + scap 2/20  2# 20 ea 2# 20 flex, 0# 20 abd 2# 20 flex, 0# 20 abd 2# 20 flex, 0# abd 2# 20 flex, 0# abd 2# 20 ea NV   OH KB carry L       NV 5# 2 laps x 24 feet 5# 2 laps 24 feet   Wall push ups   20 20 20 20 20 20 20   OH cone stacking L (5 cones) flex and scap   2#, 0#  2x ea 2#, 0# 2x ea 2#, 0# 2x ea 2# flex, 0# 20 ea 2# 20 flex, 20 abd 2# 20 ea NV   GAIT TRAINING                                                            MODALITIES           CP L shoulder sitting 5' 5' 5' 5' 5' held 5' 5'                                        "

## 2025-04-28 ENCOUNTER — OFFICE VISIT (OUTPATIENT)
Dept: PHYSICAL THERAPY | Facility: CLINIC | Age: 45
End: 2025-04-28
Payer: COMMERCIAL

## 2025-04-28 DIAGNOSIS — M75.102 LEFT ROTATOR CUFF TEAR ARTHROPATHY: Primary | ICD-10-CM

## 2025-04-28 DIAGNOSIS — M12.812 LEFT ROTATOR CUFF TEAR ARTHROPATHY: Primary | ICD-10-CM

## 2025-04-28 PROCEDURE — 97530 THERAPEUTIC ACTIVITIES: CPT

## 2025-04-28 PROCEDURE — 97110 THERAPEUTIC EXERCISES: CPT

## 2025-04-28 NOTE — PROGRESS NOTES
"Daily Note     Today's date: 2025  Patient name: Rayne Irizarry  : 1980  MRN: [id number]  Referring provider: Prabha Su PA-C  Dx:   Encounter Diagnosis     ICD-10-CM    1. Left rotator cuff tear arthropathy  M75.102     M12.812                Subjective:  Pt reports he continues to see improvements in his shoulder strength and his motion. No adverse effects to progressions LV       Objective:  See treatment diary below      Assessment:  Pt continues to progress very well. PROM almost full in all directions- slightly limited in full flexion and ER. Progressed OH KB strengthening as suggested by primary PT with good tolerance.   He will continue to benefit from skilled PT to maximize L UE function.        Plan:  Continues to strengthen and maximize ROM.        Precautions:  Sling until 25  CO-MORBIDITIES:  L RTC repair 24  HEP ACCESS CODE:   FOTO Completed On:     POC Expires Reeval for Medicare to be completed Unit LImit Auth Expiration Date PT/OT/STVisit Limit   25 By visit  N/A N/A N/A BOMN                       TREATMENT DIARY  Auth Status DATE     NA Visit # 18 19 20 21 22 23     Remain          MANUAL THERAPY          PROM L shoulder 5' 5' 5' 5' 5' 5'                         THERAPEUTIC EXERCISE HEP          AROM L elbow flex/ext  4# 20 5# 20 5# 20 5# 20 5# 20 5# 20    Standing B shoulder flex  3# 20 3# 20 3# 20 3# 20  3# 20 3# 20    Standing B scaptions  0# 20 1# 20 3# 20 3# 20 3# 20 3# 20    L digiflex  Black 30 D/C        TB L shoulder ER 3/11 Blue 20 Blue 20 Blue 20 Blue 20 Blue 20 Blue 20    TB L shoulder IR  Blue 20 Blue 20 Blue 20 Blue 20 Blue 20 Blue 20    Prone \"T\" L  2# 20 2# 20 3# 20 3# 20 3# 20 3# 20    Prone \"Y\" L  2# 20 2# 20 3# 20 3# 20 3# 20 3# 20    Prone rows L  4# 20 5# 20 5# 20 5# 20 5# 20 5# 20    Prone \"I\" L  18 4# 20 4# 20 4# 20 5# 20 5# 20 5# 20    S/L L shoulder ER  3# 20 3# 20 3# 20 3# 20 3# 20 3# 20    Strap " "behind back stretch into IR L 2/13 10\" 10 10\" 10 10\" 10 10\" 10 10\" 10 10\"x10    TB rows B  Plum 20 Plum 20 Plum 20 Plum 20 Plum 20 Plum 20    Wall slides OH with TB horiz abd B   OTB 20 OTB 20 OTB 20 OTB 20 OTB 20    Eccentric lowering from OH L 3/27 Held - NV         Supine medicine ball circles in supine CW/CCW L  4# 20 ea 4# 20 ea 4# 20 ea 4# 20 ea 4# 20 ea 4# 20 ea    Supine protraction L with medicine ball 2/13 4# 20 4# 20 4# 20 4# 20 4# 20  4# 20     Standing cane AAROM L shoulder flex, ext, ER 2/13 10 ea 10 ea 10 ea 10 ea 10 ea X10 ea    NEUROMUSCULAR REEDUCATION            Standing protraction into wall at head height CW/CCW circles L   20 ea 20 ea 20 ea 20 ea 20 ea                                     THERAPEUTIC ACTIVITY           Flexbar sup/pron  Green 20 ea D/C        Flexbar L wrist flex/ext  Green 20 ea D/C        Statue of liberty L (flexbar OH)  Red 15\" 3 Red 15\" 5 Red 15\" 5  Red 15\" 5 Red 15\" 5 Red 15\" 5    Standing UBE  L3 2'/2' L3 2'/2' L3 2'/2' L3 2'/2' L3 2'/2' L3 2'/2'    Eccentric lowering from OH L shoulder           Wall wash L shoulder flex + scap 2/20 2# 20 flex, 0# 20 abd 2# 20 flex, 0# abd 2# 20 flex, 0# abd 2# 20 ea NV 2# 20 ea    OH KB carry L    NV 5# 2 laps x 24 feet 5# 2 laps 24 feet 5# 3 laps 24 feet    Wall push ups  20 20 20 20 20     OH cone stacking L (5 cones) flex and scap  2#, 0# 2x ea 2# flex, 0# 20 ea 2# 20 flex, 20 abd 2# 20 ea NV 2# 20 ea    GAIT TRAINING                                                       MODALITIES          CP L shoulder sitting 5' 5' held 5' 5' 5'                                        "

## 2025-04-30 ENCOUNTER — APPOINTMENT (OUTPATIENT)
Dept: PHYSICAL THERAPY | Facility: CLINIC | Age: 45
End: 2025-04-30
Payer: COMMERCIAL

## 2025-05-05 ENCOUNTER — EVALUATION (OUTPATIENT)
Dept: PHYSICAL THERAPY | Facility: CLINIC | Age: 45
End: 2025-05-05
Payer: COMMERCIAL

## 2025-05-05 DIAGNOSIS — M12.812 LEFT ROTATOR CUFF TEAR ARTHROPATHY: Primary | ICD-10-CM

## 2025-05-05 DIAGNOSIS — M75.102 LEFT ROTATOR CUFF TEAR ARTHROPATHY: Primary | ICD-10-CM

## 2025-05-05 PROCEDURE — 97110 THERAPEUTIC EXERCISES: CPT | Performed by: PHYSICAL THERAPIST

## 2025-05-05 PROCEDURE — 97112 NEUROMUSCULAR REEDUCATION: CPT | Performed by: PHYSICAL THERAPIST

## 2025-05-05 NOTE — PROGRESS NOTES
PT Re-evaluation    Today's date: 2025  Patient name: Rayne Irizarry  : 1980  MRN: [id number]  Referring provider: Prabha Su PA-C  Dx:   Encounter Diagnosis     ICD-10-CM    1. Left rotator cuff tear arthropathy  M75.102     M12.812                Assessment  Impairments: abnormal or restricted ROM, activity intolerance, impaired physical strength and pain with function  Symptom irritability: low    Assessment details: Rayne Irizarry is a 44 y.o. male who presented to outpatient physical therapy at [facility] with complaints of L shoulder pain.  He presented with decreased L shoulder range of motion, decreased L UE strength, limited flexibility, poor postural awareness, decreased tolerance to activity and decreased functional mobility following L RTC repair on 24.  His L shoulder AROM is now WNL in all motions.  Strength L shoulder is very close to WNL in all motions as well with most weakness in abd and ER = 4+/5.  He will continue to benefit from skilled PT services for another 6-8 weeks in order to address these deficits and reach maximum level of function.  Thank you for the referral!        Understanding of Dx/Px/POC: excellent     Prognosis: excellent    Goals  ST.  Independent with HEP in 2 weeks - Met  2.  Increase L shoulder PROM to within 20 degrees of WNL all motions in 3 weeks - Met   3.  Good postural awareness in 2 weeks - Met    LT.  Achieve FOTO score of 59/100 in 12 weeks - Met   2.  Able to lift up to 5# to head level with L UE and complete all L UE tasks in 12 weeks - Met  3.  Strength all L UE motions = 4/5 in 12 weeks - Met    NEW GOALS (4 weeks)  1.  Strength all L UE motions = 5/5    Plan  Patient would benefit from: skilled physical therapy  Planned modality interventions: cryotherapy    Planned therapy interventions: home exercise program, functional ROM exercises, joint mobilization, manual therapy, neuromuscular re-education, therapeutic exercise, therapeutic  activities, stretching, strengthening, flexibility and postural training    Frequency: 2x week  Duration in weeks: 4  Treatment plan discussed with: patient        Subjective Evaluation    History of Present Illness  Mechanism of injury: Pt reports chronic B shoulder pain of insidious onset.  Notes L shoulder pain may have been brought on after bumping into a window/window sill a few years ago.  Tried PT earlier this year without pain relief.  He had a L RTC repair on 24 and feels his progression has been good with almost all pain now eliminated.  Feels he is doing essentially all ADLs normally now.  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            Recurrent probem    Quality of life: good    Patient Goals  Patient goals for therapy: decreased pain, increased strength and return to sport/leisure activities    Pain  Current pain ratin  At best pain ratin  At worst pain ratin  Quality: dull ache  Relieving factors: ice and medications  Aggravating factors: overhead activity and lifting  Progression: improved    Social Support  Lives with: spouse    Employment status: working  Hand dominance: right    Treatments  Previous treatment: physical therapy, medication and immobilization  Current treatment: medication and physical therapy        Objective     Postural Observations  Seated posture: good  Standing posture: good  Correction of posture: makes symptoms better      Neurological Testing     Sensation     Shoulder   Left Shoulder   Intact: light touch    Right Shoulder   Intact: Light touch    Active Range of Motion   Cervical/Thoracic Spine       Cervical    Flexion: 50 degrees   Extension: 47 degrees      Left rotation: 66 degrees  Right rotation: 70 degrees       Thoracic    Right lateral flexion:  Restriction level: minimal  Left rotation:  Restriction level: minimal  Right rotation:  Restriction level: minimal  Left Shoulder   Normal active range of motion  Internal rotation 45°:  "7 degrees     Right Shoulder   Normal active range of motion    Passive Range of Motion   Left Shoulder   Normal passive range of motion    Right Shoulder   Normal passive range of motion    Strength/Myotome Testing     Left Shoulder     Planes of Motion   Flexion: 5   Extension: 5   Abduction: 4+ (pain)   External rotation at 0°: 4+ (pain)   Internal rotation at 0°: 5   Left shoulder horizontal abduction strength: pain.     Isolated Muscles   Biceps: 5   Lower trapezius strength: pain.     Right Shoulder     Planes of Motion   Flexion: 5   Extension: 5   Abduction: 4+   External rotation at 0°: 5   Internal rotation at 0°: 5          Precautions:  None  CO-MORBIDITIES:  L RTC repair 12/17/24  HEP ACCESS CODE:   FOTO Completed On:     POC Expires Reeval for Medicare to be completed Unit LImit Auth Expiration Date PT/OT/STVisit Limit   6/2/25 By visit  N/A N/A N/A BOMN                       TREATMENT DIARY  Auth Status DATE 4/16 4/21 4/23 4/28 5/5      NA Visit # 20 21 22 23 24       Remain           MANUAL THERAPY           PROM L shoulder 5' 5' 5' 5'  5'                            THERAPEUTIC EXERCISE HEP           AROM L elbow flex/ext 12/23 5# 20 5# 20 5# 20 5# 20 5# 20      Standing B shoulder flex  3# 20 3# 20  3# 20 3# 20 3# 20      Standing B scaptions  3# 20 3# 20 3# 20 3# 20 3# 20      L digiflex 12/23           TB L shoulder ER 3/11 Blue 20 Blue 20 Blue 20 Blue 20 Blue 20      TB L shoulder IR  Blue 20 Blue 20 Blue 20 Blue 20 Blue 20      Prone \"T\" L  3# 20 3# 20 3# 20 3# 20 3# 20      Prone \"Y\" L  3# 20 3# 20 3# 20 3# 20 3# 20      Prone rows L 2/18 5# 20 5# 20 5# 20 5# 20 5# 20      Prone \"I\" L  2/18 4# 20 5# 20 5# 20 5# 20 5# 20      S/L L shoulder ER 2/18 3# 20 3# 20 3# 20 3# 20 3# 20      Strap behind back stretch into IR L 2/13 10\" 10 10\" 10 10\" 10 10\"x10 10\" 10      TB rows B  Plum 20 Plum 20 Plum 20 Plum 20 Plum 20      Wall slides OH with TB horiz abd B  OTB 20 OTB 20 OTB 20 OTB 20 BTB 20    " "  Eccentric lowering from OH L 3/27           Supine medicine ball circles in supine CW/CCW L  4# 20 ea 4# 20 ea 4# 20 ea 4# 20 ea 7# 20 ea      Supine protraction L with medicine ball 2/13 4# 20 4# 20 4# 20  4# 20  7# 20      Standing cane AAROM L shoulder flex, ext, ER 2/13 10 ea 10 ea 10 ea X10 ea 10 ea      NEUROMUSCULAR REEDUCATION             Standing protraction into wall at head height CW/CCW circles L  20 ea 20 ea 20 ea 20 ea 20 ea                                          THERAPEUTIC ACTIVITY            Flexbar sup/pron            Flexbar L wrist flex/ext            Statue of liberty L (flexbar OH)  Red 15\" 5  Red 15\" 5 Red 15\" 5 Red 15\" 5 Red 15\" 5      Standing UBE  L3 2'/2' L3 2'/2' L3 2'/2' L3 2'/2' L4 2'/2'      Eccentric lowering from OH L shoulder            Wall wash L shoulder flex + scap 2/20 2# 20 flex, 0# abd 2# 20 ea NV 2# 20 ea 3# 20 ea      OH KB carry L  NV 5# 2 laps x 24 feet 5# 2 laps 24 feet 5# 3 laps 24 feet 7.5# 3 laps 24 ft      Wall push ups  20 20 20        OH cone stacking L (5 cones) flex and scap  2# 20 flex, 20 abd 2# 20 ea NV 2# 20 ea 3# 20 ea      GAIT TRAINING                                                            MODALITIES           CP L shoulder sitting held 5' 5' 5' 5'                                           "

## 2025-05-07 ENCOUNTER — OFFICE VISIT (OUTPATIENT)
Dept: PHYSICAL THERAPY | Facility: CLINIC | Age: 45
End: 2025-05-07
Payer: COMMERCIAL

## 2025-05-07 ENCOUNTER — APPOINTMENT (OUTPATIENT)
Dept: LAB | Facility: HOSPITAL | Age: 45
End: 2025-05-07
Payer: COMMERCIAL

## 2025-05-07 DIAGNOSIS — E74.39 GLUCOSE INTOLERANCE: ICD-10-CM

## 2025-05-07 DIAGNOSIS — M75.102 LEFT ROTATOR CUFF TEAR ARTHROPATHY: Primary | ICD-10-CM

## 2025-05-07 DIAGNOSIS — M12.812 LEFT ROTATOR CUFF TEAR ARTHROPATHY: Primary | ICD-10-CM

## 2025-05-07 DIAGNOSIS — E66.3 OVERWEIGHT (BMI 25.0-29.9): ICD-10-CM

## 2025-05-07 DIAGNOSIS — E78.5 DYSLIPIDEMIA: ICD-10-CM

## 2025-05-07 DIAGNOSIS — R73.01 IMPAIRED FASTING GLUCOSE: ICD-10-CM

## 2025-05-07 DIAGNOSIS — E03.8 OTHER SPECIFIED HYPOTHYROIDISM: ICD-10-CM

## 2025-05-07 LAB
ALBUMIN SERPL BCG-MCNC: 4.5 G/DL (ref 3.5–5)
ALP SERPL-CCNC: 69 U/L (ref 34–104)
ALT SERPL W P-5'-P-CCNC: 20 U/L (ref 7–52)
ANION GAP SERPL CALCULATED.3IONS-SCNC: 10 MMOL/L (ref 4–13)
AST SERPL W P-5'-P-CCNC: 22 U/L (ref 13–39)
BILIRUB SERPL-MCNC: 0.66 MG/DL (ref 0.2–1)
BUN SERPL-MCNC: 10 MG/DL (ref 5–25)
CALCIUM SERPL-MCNC: 9.5 MG/DL (ref 8.4–10.2)
CHLORIDE SERPL-SCNC: 103 MMOL/L (ref 96–108)
CHOLEST SERPL-MCNC: 213 MG/DL (ref ?–200)
CO2 SERPL-SCNC: 25 MMOL/L (ref 21–32)
CREAT SERPL-MCNC: 1.08 MG/DL (ref 0.6–1.3)
EST. AVERAGE GLUCOSE BLD GHB EST-MCNC: 117 MG/DL
GFR SERPL CREATININE-BSD FRML MDRD: 81 ML/MIN/1.73SQ M
GLUCOSE P FAST SERPL-MCNC: 77 MG/DL (ref 65–99)
HBA1C MFR BLD: 5.7 %
HDLC SERPL-MCNC: 34 MG/DL
INSULIN SERPL-ACNC: 6.35 UIU/ML (ref 1.9–23)
LDLC SERPL CALC-MCNC: 164 MG/DL (ref 0–100)
NONHDLC SERPL-MCNC: 179 MG/DL
POTASSIUM SERPL-SCNC: 4.2 MMOL/L (ref 3.5–5.3)
PROT SERPL-MCNC: 7.9 G/DL (ref 6.4–8.4)
SODIUM SERPL-SCNC: 138 MMOL/L (ref 135–147)
T4 FREE SERPL-MCNC: 1.04 NG/DL (ref 0.61–1.12)
TRIGL SERPL-MCNC: 75 MG/DL (ref ?–150)
TSH SERPL DL<=0.05 MIU/L-ACNC: 1.58 UIU/ML (ref 0.45–4.5)

## 2025-05-07 PROCEDURE — 97112 NEUROMUSCULAR REEDUCATION: CPT | Performed by: PHYSICAL THERAPIST

## 2025-05-07 PROCEDURE — 83036 HEMOGLOBIN GLYCOSYLATED A1C: CPT

## 2025-05-07 PROCEDURE — 80061 LIPID PANEL: CPT

## 2025-05-07 PROCEDURE — 80053 COMPREHEN METABOLIC PANEL: CPT

## 2025-05-07 PROCEDURE — 97110 THERAPEUTIC EXERCISES: CPT | Performed by: PHYSICAL THERAPIST

## 2025-05-07 PROCEDURE — 84443 ASSAY THYROID STIM HORMONE: CPT

## 2025-05-07 PROCEDURE — 83525 ASSAY OF INSULIN: CPT

## 2025-05-07 PROCEDURE — 36415 COLL VENOUS BLD VENIPUNCTURE: CPT

## 2025-05-07 PROCEDURE — 84439 ASSAY OF FREE THYROXINE: CPT

## 2025-05-07 NOTE — PROGRESS NOTES
Daily Note     Today's date: 2025  Patient name: Rayne Irizarry  : 1980  MRN: [id number]  Referring provider: Prabha Su PA-C  Dx:   Encounter Diagnosis     ICD-10-CM    1. Left rotator cuff tear arthropathy  M75.102     M12.812                Subjective:  Pt reports feeling better recently.  No issues after last PT session.       Objective:  See treatment diary below      Assessment:  Pt presented to physical therapy with complaints of L shoulder pain following L RTC repair on 24.  His L shoulder AROM is now WNL in all motions.  Strength L shoulder is very close to WNL in all motions as well with most weakness in abd and ER = 4+/5. He still has some soreness with lifting especially into abduction, but normal amount expected at almost 5 month post-op.  Good tolerance to added exercises and weights with most discomfort with eccentric lowering at 90 degrees flexion.  He will continue to benefit from skilled PT services for another 6-8 weeks in order to address these deficits and reach maximum level of function.        Plan:  Add TB upright rows.  Continue PT 1-2 more weeks until MD appt on 5/15/25.        Precautions:  None  CO-MORBIDITIES:  L RTC repair 24  To MD On: 5/15/25  FOTO Completed On:     POC Expires Reeval for Medicare to be completed Unit LImit Auth Expiration Date PT/OT/STVisit Limit   25 By visit  N/A N/A N/A BOMN                       TREATMENT DIARY  Auth Status DATE      NA Visit # 20 21 22 23 24 - POC 25      Remain           MANUAL THERAPY           PROM L shoulder 5' 5' 5' 5'  5' 5'                           THERAPEUTIC EXERCISE HEP           AROM L elbow flex/ext  5# 20 5# 20 5# 20 5# 20 5# 20 5# 20     Standing B shoulder flex  3# 20 3# 20  3# 20 3# 20 3# 20 3# 20     Standing B scaptions  3# 20 3# 20 3# 20 3# 20 3# 20 3# 20     L digiflex            TB L shoulder ER 3/11 Blue 20 Blue 20 Blue 20 Blue 20 Blue 20 Plum 20     TB L  "shoulder IR  Blue 20 Blue 20 Blue 20 Blue 20 Blue 20 Plum 20     Bent over \"T\" L  3# 20 3# 20 3# 20 3# 20 3# 20 3# 20     Bent over \"Y\" L  3# 20 3# 20 3# 20 3# 20 3# 20 D/C     Bent over rows L 2/18 5# 20 5# 20 5# 20 5# 20 5# 20 5# 20     Bent over \"I\" L  2/18 4# 20 5# 20 5# 20 5# 20 5# 20 5# 20     S/L L shoulder ER 2/18 3# 20 3# 20 3# 20 3# 20 3# 20 3# 20     Strap behind back stretch into IR L 2/13 10\" 10 10\" 10 10\" 10 10\"x10 10\" 10 10\" 10     TB rows B  Plum 20 Plum 20 Plum 20 Plum 20 Plum 20 CC 55# 20     Wall slides OH with TB horiz abd B  OTB 20 OTB 20 OTB 20 OTB 20 BTB 20 Blue 20     Eccentric lowering from OH L 3/27     NV 5# 10     Supine medicine ball circles in supine CW/CCW L  4# 20 ea 4# 20 ea 4# 20 ea 4# 20 ea 7# 20 ea 7# 20 ea     Supine protraction L with medicine ball 2/13 4# 20 4# 20 4# 20  4# 20  7# 20 7# 20     Standing cane AAROM L shoulder flex, ext, ER 2/13 10 ea 10 ea 10 ea X10 ea 10 ea 10 ea     NEUROMUSCULAR REEDUCATION             Standing protraction into wall at head height CW/CCW circles L  20 ea 20 ea 20 ea 20 ea 20 ea 20 ea                                         THERAPEUTIC ACTIVITY            Flexbar sup/pron            Flexbar L wrist flex/ext            Statue of liberty L (flexbar OH)  Red 15\" 5  Red 15\" 5 Red 15\" 5 Red 15\" 5 Red 15\" 5 Red 15\" 5     Standing UBE  L3 2'/2' L3 2'/2' L3 2'/2' L3 2'/2' L4 2'/2' L4 2'/2'     Eccentric lowering from OH L shoulder KB       5# 10     Wall wash L shoulder flex + scap 2/20 2# 20 flex, 0# abd 2# 20 ea NV 2# 20 ea 3# 20 ea 3# 20 ea     OH KB carry L  NV 5# 2 laps x 24 feet 5# 2 laps 24 feet 5# 3 laps 24 feet 7.5# 3 laps 24 ft 7.5# 3 laps 24 ft     Wall push ups  20 20 20        OH cone stacking L (5 cones) flex and scap  2# 20 flex, 20 abd 2# 20 ea NV 2# 20 ea 3# 20 ea 3# 20 ea     GAIT TRAINING                                                            MODALITIES           CP L shoulder sitting held 5' 5' 5' 5' 5'              "

## 2025-05-08 ENCOUNTER — RESULTS FOLLOW-UP (OUTPATIENT)
Age: 45
End: 2025-05-08

## 2025-05-08 ENCOUNTER — RESULTS FOLLOW-UP (OUTPATIENT)
Dept: FAMILY MEDICINE CLINIC | Facility: CLINIC | Age: 45
End: 2025-05-08

## 2025-05-12 ENCOUNTER — APPOINTMENT (OUTPATIENT)
Dept: PHYSICAL THERAPY | Facility: CLINIC | Age: 45
End: 2025-05-12
Payer: COMMERCIAL

## 2025-05-15 ENCOUNTER — OFFICE VISIT (OUTPATIENT)
Dept: OBGYN CLINIC | Facility: CLINIC | Age: 45
End: 2025-05-15
Payer: COMMERCIAL

## 2025-05-15 VITALS — BODY MASS INDEX: 29.55 KG/M2 | WEIGHT: 195 LBS | HEIGHT: 68 IN

## 2025-05-15 DIAGNOSIS — M75.81 TENDINITIS OF RIGHT ROTATOR CUFF: ICD-10-CM

## 2025-05-15 DIAGNOSIS — M75.122 COMPLETE TEAR OF LEFT ROTATOR CUFF, UNSPECIFIED WHETHER TRAUMATIC: Primary | ICD-10-CM

## 2025-05-15 PROCEDURE — 99213 OFFICE O/P EST LOW 20 MIN: CPT | Performed by: STUDENT IN AN ORGANIZED HEALTH CARE EDUCATION/TRAINING PROGRAM

## 2025-05-15 NOTE — PROGRESS NOTES
[facility] [place]  [address]  [facility] [address]  [facility] [address]  [phone]       Rayne Irizarry  [id number]  1980    ORTHOPAEDIC SURGERY OUTPATIENT NOTE  5/15/2025    Assessment & Plan  Complete tear of left rotator cuff, unspecified whether traumatic  Gradually return to activity as tolerated over the next month  Home exercise program reviewed  Continue outpatient PT and transition to HEP  Anti-inflammatories or Tylenol prn pain  Ice and heat as needed  No work note needed  Ice and heat as needed forReturn if symptoms worsen or fail to improve.         Tendinitis of right rotator cuff  Discussed if his right shoulder becomes more symptomatic we can consider a cortisone injection at a later point time  Activity as tolerated  Home exercise program reviewed  Anti-inflammatories or Tylenol prn pain  Ice and heat as needed for pain relief  Return if symptoms worsen or fail to improve.             The patient's diagnosis and treatment were discussed at length today. We discussed no treatment, non-operative treatment, and operative treatment.        Translation: N/A    HISTORY:  44 y.o. male  who returns today for follow-up evaluation nearly 5 months status post left shoulder arthroscopy with rotator cuff repair and subacromial decompression performed on 12/17/2024 and right rotator cuff tendinitis.  He states in regards to his left shoulder he is overall doing well.  He continues to be compliant with outpatient physical therapy and his home exercises.  He states that he has returned to nearly all his activities of daily living without any issues or complications.  He states recently his right shoulder has been bothering him more and states that his pain is predominant on the lateral aspect of his arm.  He has been incorporating exercises for both shoulders.  He denies any new injury or trauma to either upper extremity.  He denies any numbness or  "tingling.    Surgical History:  As noted in HPI    Previous Injection(s): none      The following portions of the patient's history were reviewed and updated as appropriate: allergies, current medications, past family history, past social history, past surgical history and problem list.    Ht 5' 8\" (1.727 m)   Wt 88.5 kg (195 lb)   BMI 29.65 kg/m²    Lab Results   Component Value Date    HGBA1C 5.7 (H) 05/07/2025         Past Medical History:   Diagnosis Date    Disease of thyroid gland     GERD (gastroesophageal reflux disease)        Past Surgical History:   Procedure Laterality Date    EGD AND COLONOSCOPY  11/10/2023    LIPOMA RESECTION Bilateral 2013    [place] SURGICAL ARTHROSCOPY SHOULDER W/ROTATOR CUFF RPR Left 12/17/2024    Procedure: REPAIR ROTATOR CUFF  ARTHROSCOPIC,SUBACROMIAL DECOMPRESSION;  Surgeon: Rene Aponte DO;  Location: [facility] OR;  Service: Orthopedics       Social History     Socioeconomic History    Marital status: /Civil Union     Spouse name: Not on file    Number of children: Not on file    Years of education: Not on file    Highest education level: Not on file   Occupational History    Not on file   Tobacco Use    Smoking status: Never     Passive exposure: Never    Smokeless tobacco: Never   Vaping Use    Vaping status: Never Used   Substance and Sexual Activity    Alcohol use: Not Currently     Alcohol/week: 2.0 standard drinks of alcohol     Comment: 2 drinks/month    Drug use: Never    Sexual activity: Yes     Partners: Female     Birth control/protection: Condom Male   Other Topics Concern    Not on file   Social History Narrative    Not on file     Social Drivers of Health     Financial Resource Strain: Not on file   Food Insecurity: Not on file   Transportation Needs: Not on file   Physical Activity: Not on file   Stress: Not on file   Social Connections: Not on file   Intimate Partner Violence: Not on file   Housing Stability: Not on file       Family History   Problem " Relation Age of Onset    Hypertension Mother     Diabetes Mother     Glaucoma Father     No Known Problems Maternal Grandmother     No Known Problems Maternal Grandfather     No Known Problems Paternal Grandmother     No Known Problems Paternal Grandfather     Colon cancer Neg Hx         Patient's Medications   New Prescriptions    No medications on file   Previous Medications    ALBUTEROL (VENTOLIN HFA) 90 MCG/ACT INHALER    Inhale 2 puffs every 6 (six) hours as needed for wheezing    LEVOTHYROXINE 75 MCG TABLET    Take 1 tablet (75 mcg total) by mouth daily    OMEPRAZOLE (PRILOSEC) 40 MG CAPSULE    Take 1 capsule (40 mg total) by mouth daily    TIRZEPATIDE (ZEPBOUND) 5 MG/0.5 ML AUTO-INJECTOR    Inject 0.5 mL (5 mg total) under the skin once a week    VITAMIN D, CHOLECALCIFEROL, PO    Take by mouth   Modified Medications    No medications on file   Discontinued Medications    ASPIRIN 81 MG CHEWABLE TABLET    Chew 1 tablet (81 mg total) 2 (two) times a day       Allergies[1]       REVIEW OF SYSTEMS:  Constitutional: Negative.    HEENT: Negative.    Respiratory: Negative.    Skin: Negative.    Neurological: Negative.    Psychiatric/Behavioral: Negative.  Musculoskeletal: Negative except for that mentioned in the HPI.    Gen: No acute distress, resting comfortably in bed  HEENT: Eyes clear, moist mucus membranes, hearing intact  Respiratory: No audible wheezing or stridor  Cardiovascular: Well Perfused peripherally, 2+ distal pulse  Abdomen: nondistended, no peritoneal signs     PHYSICAL EXAM:      Bilateral Shoulder Exam  Alignment / Posture:  Normal shoulder posture. No scapular dyskinesis or winging.  Inspection:  No swelling. No edema. No erythema. No ecchymosis. No muscle atrophy. No deformity.  Incisions healed bilaterally  Palpation:  Right subacromial tenderness. No effusion. No warmth. No crepitus. No clicking, catching, or snapping.  ROM:  Forward elevation to 180 degrees bilaterally, abduction to 90  "degrees bilaterally, external rotation with arm at the side to 45 degrees bilaterally, internal rotation to T10 bilaterally  Strength:  Forward elevation of 4+ out of 5 bilaterally, abduction 4+ out of 5 bilaterally, external rotation 4+ out of 5 bilaterally, and internal rotation to 5 out of 5 bilaterally  Stability:  No objective shoulder instability.  Tests: Positive Torres on the right  Neurovascular:  Sensation intact in Ax/R/M/U nerve distributions. 2+ radial pulse.    IMAGING:  No studies to review.      Electronic Medical Records were reviewed including previous notes and PT     Luis Newby    Scribe Attestation      I,:  Luis Newby am acting as a scribe while in the presence of the attending physician.:       I,:  Rene Aponte, DO personally performed the services described in this documentation    as scribed in my presence.:               Portions of the record may have been created with voice recognition software.  Occasional wrong word or \"sound a like\" substitutions may have occurred due to the inherent limitations of voice recognition software.  Read the chart carefully and recognize, using context, where substitutions have occurred. All patient's questions were answered to their satisfaction.          [1]   Allergies  Allergen Reactions    Pollen Extract Allergic Rhinitis and Shortness Of Breath     Other reaction(s): Sneezing  Other reaction(s): Sneezing       "

## 2025-05-19 ENCOUNTER — OFFICE VISIT (OUTPATIENT)
Age: 45
End: 2025-05-19

## 2025-05-19 ENCOUNTER — APPOINTMENT (OUTPATIENT)
Dept: PHYSICAL THERAPY | Facility: CLINIC | Age: 45
End: 2025-05-19
Payer: COMMERCIAL

## 2025-05-19 VITALS
HEART RATE: 71 BPM | BODY MASS INDEX: 26.89 KG/M2 | TEMPERATURE: 97.7 F | SYSTOLIC BLOOD PRESSURE: 114 MMHG | HEIGHT: 68 IN | WEIGHT: 177.4 LBS | DIASTOLIC BLOOD PRESSURE: 60 MMHG

## 2025-05-19 DIAGNOSIS — E66.811 OBESITY, CLASS I, BMI 30-34.9: Primary | ICD-10-CM

## 2025-05-19 DIAGNOSIS — R73.01 IMPAIRED FASTING GLUCOSE: ICD-10-CM

## 2025-05-19 DIAGNOSIS — E78.5 DYSLIPIDEMIA: ICD-10-CM

## 2025-05-19 DIAGNOSIS — E66.3 OVERWEIGHT (BMI 25.0-29.9): ICD-10-CM

## 2025-05-19 RX ORDER — TIRZEPATIDE 5 MG/.5ML
5 INJECTION, SOLUTION SUBCUTANEOUS WEEKLY
Qty: 2 ML | Refills: 2 | Status: SHIPPED | OUTPATIENT
Start: 2025-05-19 | End: 2025-08-11

## 2025-05-19 NOTE — PROGRESS NOTES
Assessment/Plan:    No problem-specific Assessment & Plan notes found for this encounter.    1. Obesity, Class I, BMI 30-34.9        2. Dyslipidemia  tirzepatide (Zepbound) 5 mg/0.5 mL auto-injector      3. Overweight (BMI 25.0-29.9)  tirzepatide (Zepbound) 5 mg/0.5 mL auto-injector      4. Impaired fasting glucose  tirzepatide (Zepbound) 5 mg/0.5 mL auto-injector           Rayne was seen today for follow-up.    Diagnoses and all orders for this visit:    Obesity, Class I, BMI 30-34.9    Dyslipidemia  -     tirzepatide (Zepbound) 5 mg/0.5 mL auto-injector; Inject 0.5 mL (5 mg total) under the skin once a week    Overweight (BMI 25.0-29.9)  -     tirzepatide (Zepbound) 5 mg/0.5 mL auto-injector; Inject 0.5 mL (5 mg total) under the skin once a week    Impaired fasting glucose  -     tirzepatide (Zepbound) 5 mg/0.5 mL auto-injector; Inject 0.5 mL (5 mg total) under the skin once a week        - Discussed options of HealthyCORE-Intensive Lifestyle Intervention Program, Very Low Calorie Diet-VLCD, and Conservative Program and the role of weight loss medications.  - Explained the importance of making lifestyle changes first before starting anti-obesity medications.  - Patient should demonstrate lifestyle changes first before anti-obesity medication initiated.   - Patient is interested in pursuing Conservative Program  - Initial weight loss goal of 5-10% weight loss for improved health as studies have shown this is where we see the greatest impact on improving health and decreasing risk of obesity related conditions.  - Weight loss can improve patient's co-morbid conditions and/or prevent weight-related complications.    - Labs reviewed: As below.      General Recommendations:  Nutrition:  Eat breakfast daily.  Do not skip meals.     Food log (ie.) www.[email or web address], sparkpeople.com, loseit.com, [employer].com, etc.    Practice mindful eating.  Be sure to set aside time to eat, eat slowly, and savor your  food.    Hydration:    At least 64oz of water daily.  No sugar sweetened beverages.  No juice (eat the fruit instead).    Exercise:  Studies have shown that the ideal exercise goal is somewhere between 150 to 300 minutes of moderate intensity exercise a week.  Start with exercising 10 minutes every other day and gradually increase physical activity with a goal of at least 150 minutes of moderate intensity exercise a week, divided over at least 3 days a week.  An example of this would be exercising 30 minutes a day, 5 days a week.  Resistance training can increase muscle mass and increase our resting metabolic rate.   FULL BODY resistance training is recommended 2-3 times a week.  Do not do this on consecutive days to allow for muscle recovery.    Aim for a bare minimum 5000 steps, even on days you do not exercise.    Monitoring:   Weigh yourself daily.  If this causes undue stress, then just weigh yourself once a week.  Weigh yourself the same time of the day with the same amount of clothing on.  Preferably this should be done after waking up, before you eat, and with no clothing or minimal clothing on.    Specific Goals:  Gradually increase physical activity to a goal of 5 days per week for 30 minutes of MODERATE intensity PLUS 2 days per week of FULL BODY resistance training  Goal protein intake of 60-80 grams per day  5-10 servings of fruits and vegetables per day    Calorie goal:  focus on protein goals 100-120 grams daily; referral to dietitian  (Provided with meal plan to follow).    Return visit:  2 months  1)  RX Zepbound 5mg once weekly. Emphasized the importance of adherence to the medication schedule and lifestyle modifications, including diet and exercise. Provided patient with written materials on Zepbound usage, dietary plans, and exercise recommendations. Discussed the importance of regular monitoring and follow up to ensure the safety and effectiveness of the treatment.  Education provided on side  effects, how to monitor and when to see medical attention.  Patient received training on self-administration of the injection. Goal of treatment is to attain a weight loss of approximately 5-10% TBW within next 3-6 months.  Goal is to achieve weight loss to improve overall health and reduce risks associated with obesity and weight related comorbidities.      - Side effects of Zepbound include nausea, vomiting, diarrhea, or constipation. Keep an eye on your heart rate while on Zepbound. If you resting heart rate is greater than 100 beats per minutes, please notify me. If you develop severe abdominal pain, stop Zepbound and go to the emergency room, as that could be a sign of pancreatitis.     - Please notify me if you have surgery, upper endoscopy, or colonoscopy scheduled, as we typically hold Zepbound for one week prior to the procedure.     2) Fasting labs- pending     3) Nutrition RX:  - start tracking intake  - focus on protein- goal is 30 grams per meal; 90 grams per day  - focus on increasing fiber first by increasing vegetable servings per day  - do not skip breakfast and goal water intake 64 oz daily    Physical Activity RX:  - Goal is 150-200 mins of activity weekly.  Try to include at least 2 strength training sessions; increasing lean body mass will really help you to lose weight and maintain weight loss.          Subjective:   Chief Complaint   Patient presents with    Follow-up     MWM- 2 mo F/u; Waist-39in        Patient ID: Rayne Irizarry  is a 44 y.o. male with excess weight/obesity here to pursue weight managment.  Patient is pursuing Very Low Calorie Diet-VLCD.     Patient returns for follow up; he has been on Zepbound 5mg and doing well for the most part. He is reporting some GERD but this usually resolves within 1-2 days.     Physical Activity  - just had shoulder surgery (rotator cuff repair)- he is not lifting weights now but will start with PT  Walking 45 mins most days of the week     Diet  Recall  B- eggs or banana   L- protein bar or shake with fruits, VLCD diet soups  D- soup or bars (fish, chicken)     Patient was not able to continue the VLCD; was not for him  he was not able to fit the diet restrictions into his lifestyle.    Mother just passed away from T2DM in January.  After his mother's death, patient decided he wanted to try to lose weight again to improve his overall health and decrease risks.     Physical activity:  He is getting back into activity, when walking he can get short of breath, he is taking albuterol which helps. He also thinks he has some allergies.  He was worked up by cardiology and patient reports that his tests were all within normal limits and it was recommended that he lose weight.     He did try Saxenda in 2022 which helped but then moved and his insurance did not cover so he stopped. He tolerated well for the most part.     B- coffee  L- rice, roti bread ( bread), valdez  D- valdez, chicken, vegetable     HPI    Wt Readings from Last 20 Encounters:   05/19/25 80.5 kg (177 lb 6.4 oz)   05/15/25 88.5 kg (195 lb)   03/14/25 88.5 kg (195 lb 3.2 oz)   03/13/25 89.4 kg (197 lb)   02/14/25 87.5 kg (193 lb)   12/26/24 88.5 kg (195 lb)   12/17/24 88.5 kg (195 lb)   11/14/24 87.1 kg (192 lb)   10/24/24 86.8 kg (191 lb 6.4 oz)   08/20/24 85.7 kg (189 lb)   06/17/24 85.3 kg (188 lb)   06/03/24 85.4 kg (188 lb 3.2 oz)   05/21/24 85.7 kg (189 lb)   05/03/24 89.4 kg (197 lb)   05/01/24 89.4 kg (197 lb)   04/19/24 83.9 kg (185 lb)   04/01/24 89.2 kg (196 lb 9.6 oz)   03/13/24 87.9 kg (193 lb 12.8 oz)   12/04/23 85.7 kg (189 lb)   11/10/23 81.6 kg (180 lb)           Review of Systems   Constitutional:  Negative for fatigue.   HENT:  Negative for sore throat.    Respiratory:  Negative for cough and shortness of breath.    Cardiovascular:  Negative for chest pain, palpitations and leg swelling.   Gastrointestinal:  Negative for abdominal pain, constipation, diarrhea and nausea.  "  Genitourinary:  Negative for dysuria.   Musculoskeletal:  Negative for arthralgias and back pain.   Skin:  Negative for rash.   Neurological:  Negative for headaches.   Psychiatric/Behavioral:  Negative for dysphoric mood. The patient is not nervous/anxious.        Objective:    /60 (BP Location: Left arm, Patient Position: Sitting)   Pulse 71   Temp 97.7 °F (36.5 °C) (Tympanic)   Ht 5' 8\" (1.727 m)   Wt 80.5 kg (177 lb 6.4 oz)   BMI 26.97 kg/m²      Physical Exam  Vitals and nursing note reviewed.   Constitutional:       Appearance: Normal appearance.   HENT:      Head: Normocephalic.   Neck:      Thyroid: No thyroid mass, thyromegaly or thyroid tenderness.     Cardiovascular:      Rate and Rhythm: Normal rate and regular rhythm.      Pulses: Normal pulses.      Heart sounds: Normal heart sounds.   Pulmonary:      Effort: Pulmonary effort is normal.      Breath sounds: Normal breath sounds.   Abdominal:      General: Abdomen is flat.      Palpations: Abdomen is soft.     Musculoskeletal:      Cervical back: Normal range of motion and neck supple. No tenderness.     Skin:     General: Skin is warm and dry.     Neurological:      General: No focal deficit present.      Mental Status: He is alert and oriented to person, place, and time.     Psychiatric:         Mood and Affect: Mood normal.         Behavior: Behavior normal.         Thought Content: Thought content normal.         Judgment: Judgment normal.         Labs   Most recent labs reviewed   Lab Results   Component Value Date    SODIUM 138 05/07/2025    K 4.2 05/07/2025     05/07/2025    CO2 25 05/07/2025    AGAP 10 05/07/2025    BUN 10 05/07/2025    CREATININE 1.08 05/07/2025    GLUC 80 05/31/2024    GLUF 77 05/07/2025    CALCIUM 9.5 05/07/2025    AST 22 05/07/2025    ALT 20 05/07/2025    ALKPHOS 69 05/07/2025    TP 7.9 05/07/2025    TBILI 0.66 05/07/2025    EGFR 81 05/07/2025     Lab Results   Component Value Date    HGBA1C 5.7 (H) " 05/07/2025     Lab Results   Component Value Date    ATL4SQMPQIPM 1.578 05/07/2025     Lab Results   Component Value Date    CHOLESTEROL 213 (H) 05/07/2025     Lab Results   Component Value Date    HDL 34 (L) 05/07/2025     Lab Results   Component Value Date    TRIG 75 05/07/2025     Lab Results   Component Value Date    LDLCALC 164 (H) 05/07/2025

## 2025-08-07 ENCOUNTER — TELEPHONE (OUTPATIENT)
Dept: OBGYN CLINIC | Facility: CLINIC | Age: 45
End: 2025-08-07

## (undated) DEVICE — NEEDLE SPINAL18G X 3.5 IN QUINCKE

## (undated) DEVICE — SPONGE SCRUB 4 PCT CHLORHEXIDINE

## (undated) DEVICE — GLOVE INDICATOR PI UNDERGLOVE SZ 7 BLUE

## (undated) DEVICE — VAPR COOLPULSE 90 ELECTRODE 90 DEGREES SUCTION WITH INTEGRATED HANDPIECE: Brand: VAPR COOLPULSE

## (undated) DEVICE — BETHLEHEM UNIV MAJOR ORTHO,KIT: Brand: CARDINAL HEALTH

## (undated) DEVICE — FIBERTAK ROTATOR CUFF DISPOSABLES KIT

## (undated) DEVICE — IMPERVIOUS STOCKINETTE: Brand: DEROYAL

## (undated) DEVICE — NEEDLE 23G X 1 1/2 SAFETY-GLIDE THIN WALL

## (undated) DEVICE — NEEDLE 18 G X 1 1/2

## (undated) DEVICE — SYRINGE 3ML LL

## (undated) DEVICE — POSITIONER TRIMANO LIMB BEACH CHAIR

## (undated) DEVICE — SUT MONOCRYL 3-0 PS-2 27 IN Y427H

## (undated) DEVICE — 3M™ STERI-STRIP™ REINFORCED ADHESIVE SKIN CLOSURES, R1547, 1/2 IN X 4 IN (12 MM X 100 MM), 6 STRIPS/ENVELOPE: Brand: 3M™ STERI-STRIP™

## (undated) DEVICE — FILTER STRAW 1.7

## (undated) DEVICE — BLADE SHAVER EXCALIBUR 4MM 13CM COOLCUT

## (undated) DEVICE — 3M™ STERI-DRAPE™ U-DRAPE 1015: Brand: STERI-DRAPE™

## (undated) DEVICE — PUDDLE VAC

## (undated) DEVICE — DRESSING MEPILEX AG BORDER POST-OP 4 X 6 IN

## (undated) DEVICE — DRESSING MEPILEX AG BORDER 4 X 4 IN

## (undated) DEVICE — GLOVE INDICATOR PI UNDERGLOVE SZ 8 BLUE

## (undated) DEVICE — GLOVE SRG BIOGEL 7.5

## (undated) DEVICE — SURGI KIT INSTRUMENT ORGANIZER

## (undated) DEVICE — THREADED CLEAR CANNULA WITH OBTURATOR 7MM X 75MM

## (undated) DEVICE — TUBING SUCTION 5MM X 12 FT

## (undated) DEVICE — T-MAX DISPOSABLE FACE MASK 8 PER BOX

## (undated) DEVICE — CHLORAPREP HI-LITE 26ML ORANGE

## (undated) DEVICE — BURR  OVAL 4MM 13CM 8 FLUTE COOLCUT

## (undated) DEVICE — MAT ABSORBANT ARTHROSCOPY FLOOR 46 X 40 IN

## (undated) DEVICE — TUBING ARTHROSCOPIC WAVE  MAIN PUMP

## (undated) DEVICE — GLOVE SRG BIOGEL 8

## (undated) DEVICE — GLOVE SRG LF STRL BGL SKNSNS 6.5 PF

## (undated) DEVICE — THREADED CLEAR CANNULA WITH OBTURATOR 8.5MM X 75MM

## (undated) DEVICE — KERLIX BANDAGE ROLL: Brand: KERLIX

## (undated) DEVICE — INTENDED FOR TISSUE SEPARATION, AND OTHER PROCEDURES THAT REQUIRE A SHARP SURGICAL BLADE TO PUNCTURE OR CUT.: Brand: BARD-PARKER ® CARBON RIB-BACK BLADES